# Patient Record
Sex: FEMALE | Race: WHITE | NOT HISPANIC OR LATINO | Employment: PART TIME | URBAN - METROPOLITAN AREA
[De-identification: names, ages, dates, MRNs, and addresses within clinical notes are randomized per-mention and may not be internally consistent; named-entity substitution may affect disease eponyms.]

---

## 2021-04-08 ENCOUNTER — TRANSFERRED RECORDS (OUTPATIENT)
Dept: HEALTH INFORMATION MANAGEMENT | Facility: CLINIC | Age: 51
End: 2021-04-08

## 2023-10-27 ENCOUNTER — TELEPHONE (OUTPATIENT)
Dept: TRANSPLANT | Facility: CLINIC | Age: 53
End: 2023-10-27
Payer: COMMERCIAL

## 2023-10-27 ENCOUNTER — DOCUMENTATION ONLY (OUTPATIENT)
Dept: TRANSPLANT | Facility: CLINIC | Age: 53
End: 2023-10-27
Payer: COMMERCIAL

## 2023-10-27 NOTE — TELEPHONE ENCOUNTER
"Donor Intake Start:10/10/23Donor Intake Complete:10/10/23  Expiration Date:24  Gender:FemalePreferred Language:English  Full Name:Jazzy King  Needed:[not answered]  Preferred Name:Jazzy  Phone Number:[phone] Phone:  Contact Preference:[not answered]Best Contact Time:11am - 4pm  Emergency Contact:Ethan Tan Contact #:[id number]  Relationship to Contact:Contact is my spouse  :72Age:53  Country:[place]  Address:[address] [place] [address] [place]  State:[place]:[id number]  Height:5'4\"Weight:178lbs  BMI:30.6  Employment Status:EmployedHas PTO for donation?Yes, using vacation  Occupation:RNRequires Heavy Lifting?  Yes  Education Level:Four Year DegreeMarital Status:  Exercise Routine:OccasionalHealth Insurance:  Yes  Blood Type:ABEthnicity/Race:White  Donor Type:Family Voucher Donor  Prefer Remote Donation:[not answered]  Physician:MERY Tafoya  Motivation to donate:  My brother did it and I was able to see first hand how he was able to change the trajectory of someone s life. I feel called to help someone in need.  Living Donor Pre-Screening  Is In [place]  Yes  Will Accept Blood Transfusions?  Yes  Has been Diagnosed with Kidney Disease?  No  Has had a Heart Attack?  No  Has Diabetes?  No  Has had Cancer?  No  Has had Kidney Stones?  No  Has ever been Pregnant?  Yes    - Is Currently Pregnant?  No    - Months Since Pregnancy?24+    - Is Currently Nursing?  No    - Gestational Diabetes?  No    - Hypertension during pregnancy?  Never  Is Planning on Pregnancy?  No  Is Taking Birth Control?  No  Has Used Tobacco  No  Has HIV?  No  Is Currently Incarcerated?  No  Is Currently Residing in [place]  Yes  History Misc  Has Allergies?  Yes  Allergy  Sertraline  Has had Surgeries?  No  Takes Medication?  No  Medical History  History of High BP?  Never  Has History Of CABG (bypass surgery)?  No  History of Blood Clots?  Never  History of " Coronary Disease?  Never  Has Stents Implanted?  No  Has History of Chest Pain with Exercise?  No  Has History of Chest Pain at Other Times?  No  Results of Climbing 2 Flights of Stairs?No Problem  Has had Stress Test within Last Year?  No  Has had Stroke?  No  Has had Leg Bypass?  No  History of Lung Disease?  Never  History of COPD?  Never  History of TB?  Never    - Is TB Active?[not answered]  History of Pneumonia?  Never  Has Respiratory Issues?  No  Has Gastro Issues?  No  History of Gallstones?  Never  History of Pancreatitis?  Never  History of Liver Disease?  Never  History of Hepatitis B?  Never    - Is Hep B Active?[not answered]  History of Hepatitis C?  Never  History of Bleeding Problem?  Never  History of UTIs?  Yes    - UTI episodes:3    - Last UTI:15+ years  History of Kidney Damage?  Never  History of Proteinuria?  Never  History of Hematuria?  Never  History of Neuro Disease?  Unknown  History of Seizure?  Never  History of Lupus?  Never  History of Paralysis?  Still being treated  History of Arthritis?  Treated in past  History of Neuropathy?  Never  History of Depression?  Never  History of Anxiety?  Never  History of Documented Psychiatric Illness?  Never  History of Fibroid Uterus?  Never  History of Endometriosis?  Never  History of Polycystic Ovaries?  Never  Has had Miscarriages?  No  Has had Abortions?  No  Has had Transfusions?  No  History of Obesity?  Unknown  History of Fabry's Disease?  No  History of Sickle Cell Disease?  No  History of Sickle Cell Trait?  No  History of Sarcoidosis?  No  History of Auto-Immune Disease  No  Has had Physical Exam?  Yes    - how many years ago:1  Has had Mammogram?  Yes    - how many years ago:1  Has had Pap Smear?  Yes    - how many years ago:3  Has had Colonoscopy?  Yes    - How Many Years Ago:2  Medical History Comments?[no comments]  Living Donor Family Medical History  Anyone with kidney disease?  Yes    - which family members:Father  Anyone with  liver disease?  Yes    - which family members:Father  Anyone with heart disease?  Yes    - which family members:Father, maternal grandfather  Anyone with coronary artery disease?  Yes    - which family members:Gather  Anyone with high blood pressure?  Yes    - which family members:Father  Anyone with blood disorder?  No  Anyone with cancer?  Yes    - which family members:Mother  Anyone with kidney cancer?  No  Anyone with diabetes?  Yes    - which family members:Father  Is mother alive?  No  Mother's age?64  Mother's cause of death?Breast cancer  Is father alive?  No  Father's age?62  Father's cause of death?Cardiac disease  How many siblings?2  How many adult children?2  How many children under 18?0  Social History  Has Used Alcohol?  Yes    - currently uses alcohol:  No    - how much:2/Yearly  Has Abused Alcohol?  No  Has Used Drugs?  No  Has had legal issues w/ law enforcement?  No  Traveled over 100 miles from home in last year?  Yes    - Traveled Where?[facility]  Has had suicidal thoughts or attempts in the last five years?  No

## 2023-10-30 ENCOUNTER — DOCUMENTATION ONLY (OUTPATIENT)
Dept: TRANSPLANT | Facility: CLINIC | Age: 53
End: 2023-10-30
Payer: COMMERCIAL

## 2023-11-07 ENCOUNTER — TELEPHONE (OUTPATIENT)
Dept: TRANSPLANT | Facility: CLINIC | Age: 53
End: 2023-11-07
Payer: COMMERCIAL

## 2023-11-07 NOTE — TELEPHONE ENCOUNTER
Initial Independent Living Donor Advocate contact made with potential donor today.  I introduced myself and my role during the donation process, including:   MEHDI ROLE   The federal government requires that all licensed transplant centers provide the living donor with an Independent Living Donor Advocate (MEHDI).  I do not meet recipients or attend meetings that discuss their care or decision to transplant them. My role is separate to avoid any conflict of interest.  My role is to ensure:  1) your rights are protected;  2) you get all the information you need from the transplant team to make a fully informed decision whether to donate;   3) that living donation is in your best interest.   4) that you have the right to decide NOT to go forward with living donation at any time during this process.  I am available to you throughout the workup, during surgery phase and follow-up at home.     WORKUP & PRIVACY   Your identity and workup are not shared with the recipient at any time.   The recipient's insurance covers the medical expenses related to the donor evaluation and surgery.  However, it is important that you carry your own health insurance to address any medical issues that are found and are NOT related to living donation.  Additionally, age appropriate cancer screening (I.e. mammograms,  colonoscopies, etc) is required and would be through your insurance.  There is a psychosocial and medical donor workup that consists of testing to determine if you are healthy enough to donate. Workup tests include tissue typing/genetics, many blood draws, urine collection/ (kidney function testing), chest x-ray, EKG/other heart testing, CT scan. Age appropriate cancer screening is required and would be through your insurance. As you complete each step then you may move on to the next.  Workup can take as little or as long as you need and you can stop the process at any time. Transplant is a treatment option, not a cure. A kidney  from a living kidney donor can last 12-14 years.  Other treatment options are  donation and two types of dialysis.   This is major surgery and your estimated hospital stay is approximately 1-2 nights.  After surgery, there are driving and lifting restrictions - no driving for two weeks and no lifting over ten pounds for 8 weeks.  Donors are routinely off from work for 4 - 6 weeks after surgery, and potentially longer if they have a physical job.     If you anticipate lost wages due to donation, donor wage reimbursement options may be available to you and will be reviewed with you during the evaluation process. Donor Shield and NLDAC explained.  We reviewed the importance of completing follow-up labs and surveys at six months, 1 year and 2 years after donation to monitor kidney health and the impact donation has had on their life post donation.        QUESTIONS    Have you received the Welcome e-mail that includes copies of the informed consent, financial letter, information on donor shield and NLDAC from the transplant department? Yes.    Have you discussed with anyone your potential decision to donate?   Yes.    Is anyone pressuring or coercing you to donate? No.    Have you discussed any financial arrangements with recipient around donating a kidney? No.    Are you aware that you can confidentially opt out at any time, up to and including day of donation? Yes.    At this time, would you like to proceed with the medical evaluation to see if you can be a kidney donor?  Yes.    If yes, I will make an appointment for your donor coordinator to reach out to you with next steps.     Contact information for MEHDI's was provided Yes.    Krissy Garcia- [phone]  Ese Ramirez-  [phone]    Confirmed that she reviewed Informed consent document and all questions answered.  Reviewed that they will receive Docusign to obtain electronic signature for the following: Informed consent, SRTR data, QING for medical  information, Auth for Electronic communication, Kidney for Life and will need their signed consent back before proceeding with evaluation.     Time frame for donation: open  Paired exchange was introduced  Yes.      MyChart was initiated  Yes.  CareEverywhere was initiated No.    MEHDI NOTES: Jazzy wants to be a NDD. Her brother was a living liver donor last year in [place] and this inspired her. She is scheduled to talk to India Moreno on 11/20 at 11am.       Duration of call 30 minutes     AUNG Liu, CCTSW   Independent Living Donor Advocate  [facility], [facility]  Direct: [phone]  E-Mail: [email or web address]

## 2023-11-20 NOTE — TELEPHONE ENCOUNTER
Contacted Jazzy Agosto to introduce myself and my role, review of medical/surgical/family history and next steps.     Jazzy Agosto  is aware She can stop donor evaluation at any time.    Regular blood donor? No      Jazzy Agosto is a 53 year old female  ABO AB that would like to learn more about non directed donation. Open to paired exchange: yes     Concerns from medical/surgical/family history: no daily medications, history of dysplastic nevus- follows with derm, knee fracture, cancer screenings up to date    Current medications and NSAID use: has used NSAIDs infrequently     Allergies reviewed: zoloft    Legal issues w/ law enforcement: no    Reviewed any history of travel in endemic areas: [place], [place], [place]   Strongyloides- [place], [place] and [place].  Trypanosoma cruzi ([place])- [place]  West Nile Virus- [place], [place], [place], [place] and [place]. (Included in KENDELL testing prior to donation)    Per our Phase 1 algorithm, does not meet criteria to do preliminary testing. Social work screening no.      Verified that potential donor was provided the informed consent DocuSign and they are comfortable moving forward to living donor evaluation.    Reviewed evaluation testing: Iohexol, Lab work, CXR, EKG, Provider visits and functions, CT Angiogram.     Reviewed operations of selection committee and angio review meetings and the need for multidisciplinary input. Post-donation requirements include post-op appointment with your surgeon at 2 weeks after surgery, 6 week, 6 month, 1 year and 2 year lab tests.     Reviewed NKR listing and transfer of care to [facility] team if approved. Provided Jazzy with NKR website to review.     Briefly went over options if approved of NDD and voucher donation.     Jazzy would like to proceed with next steps: evaluation on 12/07/23     Encouraged sign up for [employer] and reviewed importance of watching teaching videos prior to  evaluation.     Verified recipient status if not NDD.    Donor timeline: TBD     Will send orders to scheduling team to set up for evaluation testing.

## 2023-11-21 DIAGNOSIS — Z00.5 TRANSPLANT DONOR EVALUATION: Primary | ICD-10-CM

## 2023-11-21 RX ORDER — ALBUTEROL SULFATE 0.83 MG/ML
2.5 SOLUTION RESPIRATORY (INHALATION)
Status: CANCELLED | OUTPATIENT
Start: 2023-12-07

## 2023-11-21 RX ORDER — ALBUTEROL SULFATE 90 UG/1
1-2 AEROSOL, METERED RESPIRATORY (INHALATION)
Status: CANCELLED
Start: 2023-12-07

## 2023-11-21 RX ORDER — EPINEPHRINE 1 MG/ML
0.3 INJECTION, SOLUTION, CONCENTRATE INTRAVENOUS EVERY 5 MIN PRN
Status: CANCELLED | OUTPATIENT
Start: 2023-12-07

## 2023-11-21 RX ORDER — MEPERIDINE HYDROCHLORIDE 25 MG/ML
25 INJECTION INTRAMUSCULAR; INTRAVENOUS; SUBCUTANEOUS EVERY 30 MIN PRN
Status: CANCELLED | OUTPATIENT
Start: 2023-12-07

## 2023-11-21 RX ORDER — DIPHENHYDRAMINE HYDROCHLORIDE 50 MG/ML
50 INJECTION INTRAMUSCULAR; INTRAVENOUS
Status: CANCELLED
Start: 2023-12-07

## 2023-11-21 RX ORDER — METHYLPREDNISOLONE SODIUM SUCCINATE 125 MG/2ML
125 INJECTION, POWDER, LYOPHILIZED, FOR SOLUTION INTRAMUSCULAR; INTRAVENOUS
Status: CANCELLED
Start: 2023-12-07

## 2023-12-04 ENCOUNTER — TELEPHONE (OUTPATIENT)
Dept: TRANSPLANT | Facility: CLINIC | Age: 53
End: 2023-12-04
Payer: COMMERCIAL

## 2023-12-04 NOTE — TELEPHONE ENCOUNTER
MEHDI received call that Jazzy wants to reschedule her eval as she has to go to a . Called back and she was able to talk to someone in the main txp office about this.     Ese Ramirez, [facility], Paul Oliver Memorial HospitalSW   Independent Living Donor Advocate  [facility], [facility]  Direct: [phone]  E-Mail: [email or web address]

## 2023-12-31 ENCOUNTER — HEALTH MAINTENANCE LETTER (OUTPATIENT)
Age: 53
End: 2023-12-31

## 2024-01-17 LAB
ABO/RH(D): NORMAL
ANTIBODY SCREEN: NEGATIVE
SPECIMEN EXPIRATION DATE: NORMAL

## 2024-01-18 ENCOUNTER — OFFICE VISIT (OUTPATIENT)
Dept: TRANSPLANT | Facility: CLINIC | Age: 54
End: 2024-01-18
Attending: INTERNAL MEDICINE

## 2024-01-18 ENCOUNTER — ALLIED HEALTH/NURSE VISIT (OUTPATIENT)
Dept: TRANSPLANT | Facility: CLINIC | Age: 54
End: 2024-01-18
Attending: INTERNAL MEDICINE

## 2024-01-18 ENCOUNTER — LAB (OUTPATIENT)
Dept: LAB | Facility: CLINIC | Age: 54
End: 2024-01-18
Attending: INTERNAL MEDICINE

## 2024-01-18 ENCOUNTER — OFFICE VISIT (OUTPATIENT)
Dept: INFUSION THERAPY | Facility: CLINIC | Age: 54
End: 2024-01-18
Attending: INTERNAL MEDICINE

## 2024-01-18 ENCOUNTER — LAB (OUTPATIENT)
Dept: LAB | Facility: CLINIC | Age: 54
End: 2024-01-18

## 2024-01-18 ENCOUNTER — ANCILLARY PROCEDURE (OUTPATIENT)
Dept: GENERAL RADIOLOGY | Facility: CLINIC | Age: 54
End: 2024-01-18
Attending: INTERNAL MEDICINE
Payer: COMMERCIAL

## 2024-01-18 ENCOUNTER — ANCILLARY PROCEDURE (OUTPATIENT)
Dept: CT IMAGING | Facility: CLINIC | Age: 54
End: 2024-01-18
Attending: INTERNAL MEDICINE
Payer: COMMERCIAL

## 2024-01-18 VITALS
RESPIRATION RATE: 16 BRPM | SYSTOLIC BLOOD PRESSURE: 147 MMHG | TEMPERATURE: 96.8 F | DIASTOLIC BLOOD PRESSURE: 87 MMHG | WEIGHT: 173.3 LBS | OXYGEN SATURATION: 98 % | HEART RATE: 80 BPM

## 2024-01-18 VITALS
HEART RATE: 78 BPM | BODY MASS INDEX: 28.82 KG/M2 | HEIGHT: 65 IN | DIASTOLIC BLOOD PRESSURE: 64 MMHG | OXYGEN SATURATION: 96 % | WEIGHT: 173 LBS | SYSTOLIC BLOOD PRESSURE: 121 MMHG | TEMPERATURE: 98 F

## 2024-01-18 VITALS — HEIGHT: 65 IN | BODY MASS INDEX: 29.29 KG/M2

## 2024-01-18 DIAGNOSIS — Z00.5 TRANSPLANT DONOR EVALUATION: Primary | ICD-10-CM

## 2024-01-18 DIAGNOSIS — Z00.5 TRANSPLANT DONOR EVALUATION: ICD-10-CM

## 2024-01-18 LAB
ABO/RH(D): NORMAL
ALBUMIN MFR UR ELPH: <6 MG/DL
ALBUMIN SERPL BCG-MCNC: 4.5 G/DL (ref 3.5–5.2)
ALBUMIN UR-MCNC: NEGATIVE MG/DL
ALP SERPL-CCNC: 80 U/L (ref 40–150)
ALT SERPL W P-5'-P-CCNC: 11 U/L (ref 0–50)
ANION GAP SERPL CALCULATED.3IONS-SCNC: 10 MMOL/L (ref 7–15)
APPEARANCE UR: CLEAR
APTT PPP: 27 SECONDS (ref 22–38)
AST SERPL W P-5'-P-CCNC: 19 U/L (ref 0–45)
BACTERIA #/AREA URNS HPF: ABNORMAL /HPF
BILIRUB SERPL-MCNC: 0.4 MG/DL
BILIRUB UR QL STRIP: NEGATIVE
BUN SERPL-MCNC: 9.7 MG/DL (ref 6–20)
CALCIUM SERPL-MCNC: 9.2 MG/DL (ref 8.6–10)
CHLORIDE SERPL-SCNC: 105 MMOL/L (ref 98–107)
CHOLEST SERPL-MCNC: 204 MG/DL
CMV IGG SERPL IA-ACNC: <0.2 U/ML
CMV IGG SERPL IA-ACNC: NORMAL
COLOR UR AUTO: ABNORMAL
CREAT SERPL-MCNC: 0.77 MG/DL (ref 0.51–0.95)
CREAT UR-MCNC: 42 MG/DL
DEPRECATED HCO3 PLAS-SCNC: 25 MMOL/L (ref 22–29)
EBV VCA IGG SER IA-ACNC: 200 U/ML
EBV VCA IGG SER IA-ACNC: POSITIVE
EGFRCR SERPLBLD CKD-EPI 2021: >90 ML/MIN/1.73M2
ERYTHROCYTE [DISTWIDTH] IN BLOOD BY AUTOMATED COUNT: 12.8 % (ref 10–15)
FASTING STATUS PATIENT QL REPORTED: YES
GLUCOSE SERPL-MCNC: 91 MG/DL (ref 70–99)
GLUCOSE UR STRIP-MCNC: NEGATIVE MG/DL
HBA1C MFR BLD: 5.5 %
HCG INTACT+B SERPL-ACNC: <1 MIU/ML
HCT VFR BLD AUTO: 45.2 % (ref 35–47)
HDLC SERPL-MCNC: 44 MG/DL
HGB BLD-MCNC: 15.1 G/DL (ref 11.7–15.7)
HGB UR QL STRIP: NEGATIVE
INR PPP: 1.13 (ref 0.85–1.15)
KETONES UR STRIP-MCNC: NEGATIVE MG/DL
LDLC SERPL CALC-MCNC: 145 MG/DL
LEUKOCYTE ESTERASE UR QL STRIP: NEGATIVE
MCH RBC QN AUTO: 30.1 PG (ref 26.5–33)
MCHC RBC AUTO-ENTMCNC: 33.4 G/DL (ref 31.5–36.5)
MCV RBC AUTO: 90 FL (ref 78–100)
NITRATE UR QL: NEGATIVE
NONHDLC SERPL-MCNC: 160 MG/DL
PH UR STRIP: 6 [PH] (ref 5–7)
PHOSPHATE SERPL-MCNC: 3.4 MG/DL (ref 2.5–4.5)
PLATELET # BLD AUTO: 233 10E3/UL (ref 150–450)
POTASSIUM SERPL-SCNC: 3.8 MMOL/L (ref 3.4–5.3)
PROT SERPL-MCNC: 7.5 G/DL (ref 6.4–8.3)
PROT/CREAT 24H UR: NORMAL MG/G{CREAT}
RBC # BLD AUTO: 5.02 10E6/UL (ref 3.8–5.2)
RBC URINE: 2 /HPF
SODIUM SERPL-SCNC: 140 MMOL/L (ref 135–145)
SP GR UR STRIP: 1 (ref 1–1.03)
SPECIMEN EXPIRATION DATE: NORMAL
SQUAMOUS EPITHELIAL: 3 /HPF
T PALLIDUM AB SER QL: NONREACTIVE
TRIGL SERPL-MCNC: 74 MG/DL
URATE SERPL-MCNC: 4.6 MG/DL (ref 2.4–5.7)
UROBILINOGEN UR STRIP-MCNC: NORMAL MG/DL
WBC # BLD AUTO: 4.9 10E3/UL (ref 4–11)
WBC URINE: 1 /HPF

## 2024-01-18 PROCEDURE — 36415 COLL VENOUS BLD VENIPUNCTURE: CPT

## 2024-01-18 PROCEDURE — 84100 ASSAY OF PHOSPHORUS: CPT

## 2024-01-18 PROCEDURE — 85027 COMPLETE CBC AUTOMATED: CPT

## 2024-01-18 PROCEDURE — 99207 PR NO CHARGE COORDINATED CARE PS: CPT

## 2024-01-18 PROCEDURE — 83036 HEMOGLOBIN GLYCOSYLATED A1C: CPT

## 2024-01-18 PROCEDURE — 80053 COMPREHEN METABOLIC PANEL: CPT

## 2024-01-18 PROCEDURE — 85610 PROTHROMBIN TIME: CPT

## 2024-01-18 PROCEDURE — 86900 BLOOD TYPING SEROLOGIC ABO: CPT

## 2024-01-18 PROCEDURE — 86706 HEP B SURFACE ANTIBODY: CPT

## 2024-01-18 PROCEDURE — 81378 HLA I & II TYPING HR: CPT

## 2024-01-18 PROCEDURE — 84156 ASSAY OF PROTEIN URINE: CPT

## 2024-01-18 PROCEDURE — 86788 WEST NILE VIRUS AB IGM: CPT

## 2024-01-18 PROCEDURE — 86665 EPSTEIN-BARR CAPSID VCA: CPT

## 2024-01-18 PROCEDURE — 74175 CTA ABDOMEN W/CONTRAST: CPT | Mod: GC | Performed by: RADIOLOGY

## 2024-01-18 PROCEDURE — 93000 ELECTROCARDIOGRAM COMPLETE: CPT | Performed by: INTERNAL MEDICINE

## 2024-01-18 PROCEDURE — 71046 X-RAY EXAM CHEST 2 VIEWS: CPT | Performed by: RADIOLOGY

## 2024-01-18 PROCEDURE — 85730 THROMBOPLASTIN TIME PARTIAL: CPT

## 2024-01-18 PROCEDURE — 86481 TB AG RESPONSE T-CELL SUSP: CPT

## 2024-01-18 PROCEDURE — 99213 OFFICE O/P EST LOW 20 MIN: CPT | Performed by: SURGERY

## 2024-01-18 PROCEDURE — 99205 OFFICE O/P NEW HI 60 MIN: CPT | Performed by: SURGERY

## 2024-01-18 PROCEDURE — 86803 HEPATITIS C AB TEST: CPT

## 2024-01-18 PROCEDURE — 99205 OFFICE O/P NEW HI 60 MIN: CPT | Performed by: INTERNAL MEDICINE

## 2024-01-18 PROCEDURE — 82570 ASSAY OF URINE CREATININE: CPT

## 2024-01-18 PROCEDURE — 36415 COLL VENOUS BLD VENIPUNCTURE: CPT | Performed by: SURGERY

## 2024-01-18 PROCEDURE — 84702 CHORIONIC GONADOTROPIN TEST: CPT

## 2024-01-18 PROCEDURE — 255N000002 HC RX 255 OP 636: Performed by: INTERNAL MEDICINE

## 2024-01-18 PROCEDURE — 82542 COL CHROMOTOGRAPHY QUAL/QUAN: CPT | Performed by: SURGERY

## 2024-01-18 PROCEDURE — 80061 LIPID PANEL: CPT

## 2024-01-18 PROCEDURE — 86780 TREPONEMA PALLIDUM: CPT

## 2024-01-18 PROCEDURE — 81001 URINALYSIS AUTO W/SCOPE: CPT

## 2024-01-18 PROCEDURE — 84550 ASSAY OF BLOOD/URIC ACID: CPT

## 2024-01-18 PROCEDURE — 86644 CMV ANTIBODY: CPT

## 2024-01-18 PROCEDURE — 86704 HEP B CORE ANTIBODY TOTAL: CPT

## 2024-01-18 PROCEDURE — 87340 HEPATITIS B SURFACE AG IA: CPT

## 2024-01-18 RX ORDER — IOPAMIDOL 755 MG/ML
90 INJECTION, SOLUTION INTRAVASCULAR ONCE
Status: COMPLETED | OUTPATIENT
Start: 2024-01-18 | End: 2024-01-18

## 2024-01-18 RX ORDER — ALBUTEROL SULFATE 90 UG/1
1-2 AEROSOL, METERED RESPIRATORY (INHALATION)
Status: CANCELLED
Start: 2024-01-18

## 2024-01-18 RX ORDER — METHYLPREDNISOLONE SODIUM SUCCINATE 125 MG/2ML
125 INJECTION, POWDER, LYOPHILIZED, FOR SOLUTION INTRAMUSCULAR; INTRAVENOUS
Status: CANCELLED
Start: 2024-01-18

## 2024-01-18 RX ORDER — DIPHENHYDRAMINE HYDROCHLORIDE 50 MG/ML
50 INJECTION INTRAMUSCULAR; INTRAVENOUS
Status: CANCELLED
Start: 2024-01-18

## 2024-01-18 RX ORDER — ALBUTEROL SULFATE 0.83 MG/ML
2.5 SOLUTION RESPIRATORY (INHALATION)
Status: CANCELLED | OUTPATIENT
Start: 2024-01-18

## 2024-01-18 RX ORDER — EPINEPHRINE 1 MG/ML
0.3 INJECTION, SOLUTION INTRAMUSCULAR; SUBCUTANEOUS EVERY 5 MIN PRN
Status: CANCELLED | OUTPATIENT
Start: 2024-01-18

## 2024-01-18 RX ORDER — MEPERIDINE HYDROCHLORIDE 25 MG/ML
25 INJECTION INTRAMUSCULAR; INTRAVENOUS; SUBCUTANEOUS EVERY 30 MIN PRN
Status: CANCELLED | OUTPATIENT
Start: 2024-01-18

## 2024-01-18 RX ADMIN — IOPAMIDOL 90 ML: 755 INJECTION, SOLUTION INTRAVASCULAR at 11:57

## 2024-01-18 RX ADMIN — IOHEXOL 4 ML: 350 INJECTION, SOLUTION INTRAVENOUS at 07:29

## 2024-01-18 ASSESSMENT — PAIN SCALES - GENERAL: PAINLEVEL: NO PAIN (0)

## 2024-01-18 NOTE — LETTER
"    1/18/2024         RE: Jazzy Agosto  [address] [place] [address]        Dear Colleague,    Thank you for referring your patient, Jazzy Agosto, to the [facility]. Please see a copy of my visit note below.    Chief Complaint   Patient presents with     Iohexol     Iohexol Timed Test Nursing Note    Patient comes to [facility] today for a Iohexol GFR Timed test.   Orders from Dr. Mejia were completed.    Progress Note  Height: 5'4.5\"  Weight: 173 lb  Age: 53  Gender: female    The following information was verified with the patient:  *Female patients are not pregnant or could not have become recently pregnant: No  *Is there a history of allergy (skin rash, swelling, ect) to :   a. Iodine (except skin reactions to Betadine): NO   b. Intravenous radio-contrast agents: NO   c. Seafood: NO     RN provided patient with educational handout regarding timed test. YES    Medication administered :   Iohexol (Omnipaque 350 mg Iodine/ ml concentration) 4 mls.  Site administered left AC  Start time 0730  Stop time 0732    Blood draws were taken from left AC- second ABO.  Baseline (Time 0) 0732: the time immediately after the injection is completed.  (2 Hour) 0932  (4 hour) 1132      Patient tolerated the procedure well    Discharge Plan  Discharge instructions reviewed with patient: YES  Discharge papers printed and given to patient: YES  Patient/Representative verbalized understanding, all questions answered: YES    Discharged from unit at 0735 with whom: self to Transplant Clinic.    Administrations This Visit       iohexol (OMNIPAQUE) 350 MG/ML injectable solution 4 mL       Admin Date  01/18/2024 Action  $Given Dose  4 mL Route  Intravenous Documented By  Shelby Arellano, RN                Again, thank you for allowing me to participate in the care of your patient.        Sincerely,        Specialty Infusion Nurse  "

## 2024-01-18 NOTE — PROGRESS NOTES
Saw Jazzy in clinic on 1/18/24 for Living Kidney Donor Evaluation.     She is interested in donation as an NDD.    I provided a folder which included copies of the following:    Living Kidney Donor Evaluation Consent  Paired Exchange Consent  Donor Shield Pamphlet  Living Donor Collective Study information  Kidney for Life pamphlet  Kidney Donors are Heroes! Study synopsis  Most current SRTR data.      I also provided a parking pass and food voucher.      I reviewed the Living Kidney Donor Evaluation Consent, dated 7-6-2020 and Paired Exchange/ NDD consent dated 9-.  I answered any question.    Evaluation Notes:  Internal typing collected and sent   Donor documentation completed and sent for scan

## 2024-01-18 NOTE — LETTER
1/18/2024         RE: Jazzy Agosto  [address] [address] [address]        Dear Colleague,    Thank you for referring your patient, Jazzy Agosto, to the [facility] TRANSPLANT [facility]INIC. Please see a copy of my visit note below.    [facility]  Consult Note     Medical record number: [id number]  YOB: 1970,     Date of Visit:  01/18/2024  Consult requested by the patient for evaluation of kidney donation candidacy.    Assessment and Recommendations: Ms. Agosto appears to be a good candidate for kidney donation at this point in the evaluation. 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  No sig PMH  No abd surgery  No meds  Lost about 10 lbs over 3 months with diet and exercise  BMI is 29.18  No childbirths  Abd - fullness noted, not distended    Good candidate for donation    Risks of the surgical procedure including but not limited to the rare risk of mortality discussed in detail. Patient verbalized good understanding and had several pertinent questions which were answered satisfactorily.       Iohexol ordered for today for assessment of adequate kidney function for donation. Will be reviewed when resulted  Donor labs ordered for today and reviewed to include: CBC, CMP, Mg, PO4, hemoglobin A1c, lipid panel, blood type x 2, hemoglobin A1c, UA with microscopic, urine culture, urine protein/cr, INR/PTT, Quantiferon gold, hepatitis C (antibody), hepatitis B panel, HIV, treponemia, West Nile (antibody panel), CMV (antibody panel), EBV (antibody panel), pregnancy screen (for females of childbearing age), PSA (males >50yrs), HLA tissue typing, buccal swab for eplet typing  Social work consult ordered  Dietician consult ordered  Transplant nephrology consult ordered  Transplant coordinator consult ordered  MEHDI (independent living donor advovate) consult ordered  EKG  ordered for today and will be reviewed when resulted. Suitable to proceed today with this testing today:  Yes   Chest x-ray ordered for today and will be reviewed when resulted. Suitable to proceed with this testing today:  Yes   CT angio of abdomen and pelvis for anatomical assessment. Images and report to be reviewed when resulted.  Suitable to proceed with this testing today:  Yes  After review of the above, additional testing/concerns include:  none      The majority of our visit today was spent in counseling regarding the medical and surgical risks of kidney donation, the typical mary-and post-operative experience and recovery/return to work pattern.  We also talked about post-op visits and longer term health care maintenance, as well as the implications of having one remaining kidney. This discussion included, but was not limited to rates of complications such as bleeding, infection, need for transfusion, reoperation, other organ injury, future bowel obstruction, incisional hernia, port site pain, varicocele, venous thrombosis, pulmonary embolism, renal failure, and death (3 per 10,000). At the conclusion of the visit, all questions had been answered and Ms. Agosto's candidacy for donation will be reviewed at our Multidisciplinary Donor Selection Committee.     60 min spent on the date of the encounter in chart review, patient visit,  documentation and/or discussion with other providers about the issues documented above.    .  Wesley D[facility]az in Immunology and Transplantation  Surgical Director, Kidney & Pancreas Transplant Programs  Medical Director, Solid Organ Transplant Unit    ---------------------------------------------------------------------------------------------------    HPI: Ms. Agosto wishes to donate a kidney to non-directed.           NO  Personal history of cancer    []         Comment:     Personal history of kidney problems   []         Comment:   Personal history of  diabetes    []         Comment:                  Bladder emptying problems (prostate, urinary retention) []         Comment:   Neck or Back problems:     []         Comment:   Constipation      []         Comment:       Frequent NSAID use:         []         Comment:       Other:        []         Comment:                No past medical history on file.  No past surgical history on file.  No family history on file.  Social History     Socioeconomic History     Marital status:      Spouse name: Not on file     Number of children: Not on file     Years of education: Not on file     Highest education level: Not on file   Occupational History     Not on file   Tobacco Use     Smoking status: Never     Smokeless tobacco: Never   Substance and Sexual Activity     Alcohol use: Never     Drug use: Never     Sexual activity: Not on file   Other Topics Concern     Not on file   Social History Narrative     Not on file     Social Determinants of Health     Financial Resource Strain: Not on file   Food Insecurity: Not on file   Transportation Needs: Not on file   Physical Activity: Not on file   Stress: Not on file   Social Connections: Not on file   Interpersonal Safety: Not on file   Housing Stability: Not on file       ROS:   CONSTITUTIONAL:  No fevers or chills  EYES: negative for icterus  ENT:  negative for hearing loss, tinnitus and sore throat  RESPIRATORY:  negative for cough, sputum, dyspnea  CARDIOVASCULAR:  negative for chest pain  GASTROINTESTINAL:  negative for nausea, vomiting, diarrhea or constipation  GENITOURINARY:  negative for incontinence, dysuria, bladder emptying problems  HEME:  No easy bruising  INTEGUMENT:  negative for rash and pruritus  NEURO:  Negative for headache, seizure disorder    Allergies:   Allergies   Allergen Reactions     Sertraline Unknown       Medications:  Clinic-Administered Medications as of 1/18/2024         Dose Frequency Start End    iohexol (OMNIPAQUE) 350 MG/ML injectable  "solution 4 mL (Completed) 4 mL ONCE 1/18/2024 1/18/2024    Route: Intravenous          Exam:   Temp:  [96.8  F (36  C)-98  F (36.7  C)] 98  F (36.7  C)  Pulse:  [78-80] 78  Resp:  [16] 16  BP: (115-147)/(45-87) 121/64  SpO2:  [96 %-98 %] 96 %  Appearance: in no apparent distress.   Skin: normal  Head and Neck: Normal, no rashes or jaundice  Respiratory: normal respiratory excursions, no audible wheeze  Cardiovascular: RRR  Abdomen: rounded, No surgical scars   Extremeties: Edema, none  Neuro: without deficit       Labs:   ABO: AB POS  Chemistries:   Recent Labs   Lab Test 01/18/24  0659      POTASSIUM 3.8   CHLORIDE 105   CO2 25   ANIONGAP 10   GLC 91   BUN 9.7   CR 0.77   STU 9.2       Urine Studies:   Recent Labs   Lab Test 01/18/24  0659   COLOR Straw   APPEARANCE Clear   URINEGLC Negative   URINEBILI Negative   URINEKETONE Negative   SG 1.005   UBLD Negative   URINEPH 6.0   PROTEIN Negative   NITRITE Negative   LEUKEST Negative   RBCU 2   WBCU 1     No lab results found.    Hematology:      Recent Labs   Lab Test 01/18/24  0659   WBC 4.9   RBC 5.02   HGB 15.1   HCT 45.2   MCV 90   MCH 30.1   MCHC 33.4   RDW 12.8          Coags:   Recent Labs   Lab Test 01/18/24  0659   INR 1.13       Lipid Profile:   Cholesterol   Date Value Ref Range Status   01/18/2024 204 (H) <200 mg/dL Final     Triglycerides   Date Value Ref Range Status   01/18/2024 74 <150 mg/dL Final     Direct Measure HDL   Date Value Ref Range Status   01/18/2024 44 (L) >=50 mg/dL Final     LDL Cholesterol Calculated   Date Value Ref Range Status   01/18/2024 145 (H) <=100 mg/dL Final     Non HDL Cholesterol   Date Value Ref Range Status   01/18/2024 160 (H) <130 mg/dL Final       Virals:  CMV and EBV pending.   No lab results found.   No results found for: \"HCVAB\", \"HQTG\", \"HCGENO\", \"HCPCR\", \"HQTRNA\", \"HEPRNA\", \"CRYOG\"    No results found for: \"HCVAB\", \"HBSAB\", \"HBSAG\"           Again, thank you for allowing me to participate in the care " of your patient.        Sincerely,        Kelly Barker MD

## 2024-01-18 NOTE — LETTER
1/18/2024         RE: Jazzy Agosto  [address] [place] [address]        Dear Colleague,    Thank you for referring your patient, Jazzy Agosto, to the [facility] TRANSPLANT [facility]. Please see a copy of my visit note below.    Saw Jazzy in clinic on 1/18/24 for Living Kidney Donor Evaluation.     She is interested in donation as an NDD.    I provided a folder which included copies of the following:    Living Kidney Donor Evaluation Consent  Paired Exchange Consent  Donor Shield Pamphlet  Living Donor Collective Study information  Kidney for Life pamphlet  Kidney Donors are Heroes! Study synopsis  Most current SRTR data.      I also provided a parking pass and food voucher.      I reviewed the Living Kidney Donor Evaluation Consent, dated 7-6-2020 and Paired Exchange/ NDD consent dated 9-.  I answered any question.    Evaluation Notes:  Internal typing collected and sent   Donor documentation completed and sent for scan        Again, thank you for allowing me to participate in the care of your patient.        Sincerely,        Transplant Care Coordinator

## 2024-01-18 NOTE — PROGRESS NOTES
[facility]  Consult Note     Medical record number: [id number]  YOB: 1970,     Date of Visit:  01/18/2024  Consult requested by the patient for evaluation of kidney donation candidacy.    Assessment and Recommendations: Ms. Agosto appears to be a good candidate for kidney donation at this point in the evaluation. 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  No sig PMH  No abd surgery  No meds  Lost about 10 lbs over 3 months with diet and exercise  BMI is 29.18  No childbirths  Abd - fullness noted, not distended    Good candidate for donation    Risks of the surgical procedure including but not limited to the rare risk of mortality discussed in detail. Patient verbalized good understanding and had several pertinent questions which were answered satisfactorily.       Iohexol ordered for today for assessment of adequate kidney function for donation. Will be reviewed when resulted  Donor labs ordered for today and reviewed to include: CBC, CMP, Mg, PO4, hemoglobin A1c, lipid panel, blood type x 2, hemoglobin A1c, UA with microscopic, urine culture, urine protein/cr, INR/PTT, Quantiferon gold, hepatitis C (antibody), hepatitis B panel, HIV, treponemia, West Nile (antibody panel), CMV (antibody panel), EBV (antibody panel), pregnancy screen (for females of childbearing age), PSA (males >50yrs), HLA tissue typing, buccal swab for eplet typing  Social work consult ordered  Dietician consult ordered  Transplant nephrology consult ordered  Transplant coordinator consult ordered  MEHDI (independent living donor advovate) consult ordered  EKG ordered for today and will be reviewed when resulted. Suitable to proceed today with this testing today:  Yes   Chest x-ray ordered for today and will be reviewed when resulted. Suitable to proceed with this testing today:  Yes   CT angio of abdomen and pelvis  for anatomical assessment. Images and report to be reviewed when resulted.  Suitable to proceed with this testing today:  Yes  After review of the above, additional testing/concerns include:  none      The majority of our visit today was spent in counseling regarding the medical and surgical risks of kidney donation, the typical mary-and post-operative experience and recovery/return to work pattern.  We also talked about post-op visits and longer term health care maintenance, as well as the implications of having one remaining kidney. This discussion included, but was not limited to rates of complications such as bleeding, infection, need for transfusion, reoperation, other organ injury, future bowel obstruction, incisional hernia, port site pain, varicocele, venous thrombosis, pulmonary embolism, renal failure, and death (3 per 10,000). At the conclusion of the visit, all questions had been answered and Ms. Agosto's candidacy for donation will be reviewed at our Multidisciplinary Donor Selection Committee.     60 min spent on the date of the encounter in chart review, patient visit,  documentation and/or discussion with other providers about the issues documented above.    .  Wesley D[facility]az in Immunology and Transplantation  Surgical Director, Kidney & Pancreas Transplant Programs  Medical Director, Solid Organ Transplant Unit    ---------------------------------------------------------------------------------------------------    HPI: Ms. Agosto wishes to donate a kidney to non-directed.           NO  Personal history of cancer    []         Comment:     Personal history of kidney problems   []         Comment:   Personal history of diabetes    []         Comment:                  Bladder emptying problems (prostate, urinary retention) []         Comment:   Neck or Back problems:     []         Comment:   Constipation      []         Comment:       Frequent NSAID use:         []         Comment:        Other:        []         Comment:                No past medical history on file.  No past surgical history on file.  No family history on file.  Social History     Socioeconomic History    Marital status:      Spouse name: Not on file    Number of children: Not on file    Years of education: Not on file    Highest education level: Not on file   Occupational History    Not on file   Tobacco Use    Smoking status: Never    Smokeless tobacco: Never   Substance and Sexual Activity    Alcohol use: Never    Drug use: Never    Sexual activity: Not on file   Other Topics Concern    Not on file   Social History Narrative    Not on file     Social Determinants of Health     Financial Resource Strain: Not on file   Food Insecurity: Not on file   Transportation Needs: Not on file   Physical Activity: Not on file   Stress: Not on file   Social Connections: Not on file   Interpersonal Safety: Not on file   Housing Stability: Not on file       ROS:   CONSTITUTIONAL:  No fevers or chills  EYES: negative for icterus  ENT:  negative for hearing loss, tinnitus and sore throat  RESPIRATORY:  negative for cough, sputum, dyspnea  CARDIOVASCULAR:  negative for chest pain  GASTROINTESTINAL:  negative for nausea, vomiting, diarrhea or constipation  GENITOURINARY:  negative for incontinence, dysuria, bladder emptying problems  HEME:  No easy bruising  INTEGUMENT:  negative for rash and pruritus  NEURO:  Negative for headache, seizure disorder    Allergies:   Allergies   Allergen Reactions    Sertraline Unknown       Medications:  Clinic-Administered Medications as of 1/18/2024         Dose Frequency Start End    iohexol (OMNIPAQUE) 350 MG/ML injectable solution 4 mL (Completed) 4 mL ONCE 1/18/2024 1/18/2024    Route: Intravenous          Exam:   Temp:  [96.8  F (36  C)-98  F (36.7  C)] 98  F (36.7  C)  Pulse:  [78-80] 78  Resp:  [16] 16  BP: (115-147)/(45-87) 121/64  SpO2:  [96 %-98 %] 96 %  Appearance: in no apparent distress.  "  Skin: normal  Head and Neck: Normal, no rashes or jaundice  Respiratory: normal respiratory excursions, no audible wheeze  Cardiovascular: RRR  Abdomen: rounded, No surgical scars   Extremeties: Edema, none  Neuro: without deficit       Labs:   ABO: AB POS  Chemistries:   Recent Labs   Lab Test 01/18/24  0659      POTASSIUM 3.8   CHLORIDE 105   CO2 25   ANIONGAP 10   GLC 91   BUN 9.7   CR 0.77   STU 9.2       Urine Studies:   Recent Labs   Lab Test 01/18/24  0659   COLOR Straw   APPEARANCE Clear   URINEGLC Negative   URINEBILI Negative   URINEKETONE Negative   SG 1.005   UBLD Negative   URINEPH 6.0   PROTEIN Negative   NITRITE Negative   LEUKEST Negative   RBCU 2   WBCU 1     No lab results found.    Hematology:      Recent Labs   Lab Test 01/18/24  0659   WBC 4.9   RBC 5.02   HGB 15.1   HCT 45.2   MCV 90   MCH 30.1   MCHC 33.4   RDW 12.8          Coags:   Recent Labs   Lab Test 01/18/24  0659   INR 1.13       Lipid Profile:   Cholesterol   Date Value Ref Range Status   01/18/2024 204 (H) <200 mg/dL Final     Triglycerides   Date Value Ref Range Status   01/18/2024 74 <150 mg/dL Final     Direct Measure HDL   Date Value Ref Range Status   01/18/2024 44 (L) >=50 mg/dL Final     LDL Cholesterol Calculated   Date Value Ref Range Status   01/18/2024 145 (H) <=100 mg/dL Final     Non HDL Cholesterol   Date Value Ref Range Status   01/18/2024 160 (H) <130 mg/dL Final       Virals:  CMV and EBV pending.   No lab results found.   No results found for: \"HCVAB\", \"HQTG\", \"HCGENO\", \"HCPCR\", \"HQTRNA\", \"HEPRNA\", \"CRYOG\"    No results found for: \"HCVAB\", \"HBSAB\", \"HBSAG\"       "

## 2024-01-18 NOTE — PROGRESS NOTES
Living Kidney Donor Consent per OPTN Policy 14.2 for Independent Living Donor Advocate (MEHDI)    Organ Type: living kidney donor (NDD)  Presenting Information:  She presents to [facility], [facility] to complete a living donor evaluation since she is interested in becoming a living kidney donor.      Written assurance has been obtained from the potential donor that he/she:   Is willing to donate  Is free from inducement and coercion  Has been informed that the he/she may decline to donate at any time  Has been informed that transplant centers must:   A) Offer donors an opportunity to discontinue the donor consent or evaluation process in a way that is protected and confidential  B) Provide an independent living donor advocate (MEHDI) to assist the potential donor during this process    The following was presented to the potential donor in a language in which the potential donor is able to engage in meaningful dialogue:   Education and instruction about all phases of the living donation process including:   Consent  Medical and psychosocial evaluation  Information about the surgical procedure  Pre and post operative care  Benefits of post operative follow up  Disclosure that the recovery hospital will take all reasonable precautions to provide confidentiality for the donor/recipient  Disclosure that it is a federal crime for any person to knowingly acquire, obtain or otherwise transfer any human organ for valuable consideration  Disclosure that the [facility] hospital must provide an independent living donor advocate (MEHDI)  Disclosure that health information obtained during the evaluation is subject to the same regulations as all records and could reveal conditions that must be reported to local, state, or federal public health authorities  Disclosure that the [facility] hospital is required to report living donor follow up information at 6 months, 1 year,  and 2 years, and that the potential donor must commit to post operative follow up testing coordinated by the [facility]    Disclosure has been provided that these risks may be transient or permanent & include but are not limited to:  Potential psychosocial risks:  Problems with body image  Post-surgery depression or anxiety  Feelings of emotional distress or bereavement if recipient experiences any recurrent disease or in the event of the recipient s death  Impact of donation on the donor s lifestyle, such as limited ability to exercise in the short term post operative recovery period, no driving for the first 2 weeks post op or until the donor is no longer needing pain medications that impair the ability to drive.      Potential financial impacts:  Personal expenses of travel, housing, , lost wages related to donation might not be reimbursed. However, resources may be available to defray some donation-related expenses.   Need for life-long follow up at the donor s expense  Loss of employment or income  Negative impact on the ability to obtain future employment  Negative impact on the ability to obtain, maintain, or afford health, disability, and life insurance  Future health problems experienced by living donors following donation may not be covered by the recipient s insurance      PREPARATION FOR DONATION, RECOVERY, AND POTENTIAL SHORT-LONG-TERM OUTCOMES:  Understanding of the Living Donation Process:  We discussed the role of Independent Living Donor Advocate.  Short and long term medical and psychosocial risks to both, donor and recipient were reviewed and she expressed understanding.  Post surgical restrictions (2 weeks no driving, 6 weeks no lifting over 10 lbs) were reviewed and she appears capable of adhering to the post surgical requirements. The need for a caregiver was discussed and has support from  and family.  The risk of poor psychosocial outcome including problems with body  image, post-surgery depression or anxiety, or feelings of emotional distress or bereavement if recipient experiences any recurrent disease, poor outcome or death was reviewed.  Additionally, potential financial implications, including the risk of having difficulty obtaining health care insurance, life insurance, disability insurance, or long term care insurance were reviewed, as were available donor grants to assist with donor related expenses.      IMPRESSIONS/RECOMMENDATIONS:  Jazzy appears highly motivated to donate a kidney as a NDD.  She appears capable of understanding this information and making an informed medical decision.  As MEHDI, no concerns were identified today.  She has my contact information and is aware that I am available thoughout the donation process.    Contact Information:  Ese Ramirez, [facility], [facility]   Independent Living Donor Advocate  [facility], [facility], [facility]  Direct: [phone]  E-Mail: [email or web address]      Time Spent: 20 minutes

## 2024-01-18 NOTE — LETTER
1/18/2024         RE: Jazzy Agosto  [id number] [facility] [id number]        Dear Colleague,    Thank you for referring your patient, Jazzy Agosto, to the [facility]. Please see a copy of my visit note below.    TRANSPLANT NEPHROLOGY DONOR EVALUATION    Assessment and Plan:  # Prospective Kidney Transplant Donor: Patient with no issues that need to be addressed prior to donation. Patient's blood pressure is acceptable at this visit, kidney function appears to be acceptable with Iohexol pending, and urinalysis is bland.    Discussed the risks of donating a kidney, including the surgical risk and the possible risks of living with one kidney.    Education about expected post-donation kidney function and how chronic kidney disease (CKD) and end stage kidney disease (ESKD) might potentially impact the donor in the future, include, but not limited to:       - On average, donors will have 25-35% permanent loss of kidney function at donation.       - Baseline risk of ESKD may slightly exceed that of members of the general        population with the same demographic profile.       - Donor risks must be interpreted in light of known epidemiology of both CKD or         ESKD, such as that CKD generally develops in midlife (40-50 years old) and ESKD         generally develops after age 60.       - Medical evaluation of young potential donors cannot predict lifetime risk of CKD or         ESKD.       - Donors may be at higher risk for CKD if they sustain damage to the remaining         kidney.       - Development of CKD and progression of ESKD may be more rapid with only 1         kidney.       - Some type of kidney replacement therapy, either kidney transplant or dialysis, is         required when reaching ESKD.    Potential medical or surgical risks include, but not limited to:       - Death.       - Scars, pain, fatigue, and other consequences typical of any surgical procedure.        - Decreased kidney function.       - Abdominal or bowel symptoms, such as bloating and nausea, and developing         bowel obstruction.       - Kidney failure (ESKD) and the need for a kidney transplant or dialysis for the donor.       - Impact of obesity, hypertension, or other donor-specific medical conditions on         morbidity and mortality of the potential donor.    Patients overall evaluation will be discussed with the transplant team and a final recommendation on the patients' suitability to be a kidney transplant donor will be made at that time.    Consult:  Jazzy Agosto was seen in consultation at the request of Dr. Enoc Lunsford for evaluation as a potential kidney transplant donor.    Reason for Visit:  Jazzy Agosto is a 53 year old female who presents for a kidney donor evaluation.  Patient would like to be a non-directed donor.    Present Condition and Donor-Related Medical History:   Patient reports her brother previously donated a part of his liver about 10 years ago and more recently donated a kidney and is doing well.  She has thought about being a donor and is now interested in doing so.     Energy level is good and has been normal.  She is active and does get some exercise.  Denies any chest pain or shortness of breath with exertion.  Appetite is good and no recent weight change.  No nausea, vomiting or diarrhea.  No fever, sweats or chills.  No leg swelling.  No pain or burning with urination.          Kidney Disease Hx:       h/o Kidney Problems: No   Family h/o Genetic Kidney Disease: No       h/o Hypertension: No      Usual Blood Pressure:  Variable       h/o Protein in Urine: No    h/o Blood in Urine: No       h/o Kidney Stones: No     h/o Kidney Injury: No       h/o Recurrent UTI: No   h/o Genitourinary Problems: No       h/o Chronic NSAID Use: No         Other Medical Hx:       h/o Diabetes: No             h/o Gastrointestinal, Pancreas or Liver Problems: No       h/o Lung  or Heart Problems: No       h/o Hematologic Problems: No  h/o Bleeding or Clotting Problems: No       h/o Cancer: No       h/o Infection Problems: No       H/o Gestational DM: No      H/o Gestational HTN: No     H/o Preeclampsia: No         Skin Cancer Risk: Patient with a h/o dysplastic nevus, but no skin cancer.  Follows regularly with Dermatology, last 11/2023.       h/o more than 50 moles: Yes        h/o extensive sun exposure: No       h/o melanoma: No       Family h/o melanoma: No         Mental Health Assessment:       h/o Depression: Yes: Situational at times, but not lately       h/o Psychiatric Illness: No       h/o Suicidal Attempt(s): No    COVID Status:  Vaccination Up To Date: Yes  H/o COVID Infection: Yes; No residual symptoms     Review Of Systems:   A comprehensive review of systems was obtained and negative, except as noted in the HPI or PMH.    Past Medical History:   History was taken from the patient as noted below.  Past Medical History:   Diagnosis Date     DJD (degenerative joint disease) of knee      Dyslipidemia      Dysplastic nevus      Urinary tract infection        Past Social History:   Past Surgical History:   Procedure Laterality Date     NO HISTORY OF SURGERY       Family history of anesthesia problems: No  No personal history of anesthesia    Family History:   Family History   Problem Relation Age of Onset     Breast Cancer Mother      Diabetes Type 2  Father      Hypertension Father      Coronary Artery Disease Father      Kidney Disease Father           Specific Family History in First Degree Relatives:       FH of Kidney Dz: Yes   FH of Diabetes: Yes        FH of Hypertension: Yes  FH of CAD: Yes        FH of Cancer: Yes   FH of Kidney Cancer: No    Personal History:   Social History     Socioeconomic History     Marital status:      Spouse name: Not on file     Number of children: 2     Years of education: Not on file     Highest education level: Not on file    Occupational History     Not on file   Tobacco Use     Smoking status: Never     Smokeless tobacco: Never   Substance and Sexual Activity     Alcohol use: Never     Drug use: Never     Sexual activity: Not on file   Other Topics Concern     Not on file   Social History Narrative     Not on file     Social Determinants of Health     Financial Resource Strain: Not on file   Food Insecurity: Not on file   Transportation Needs: Not on file   Physical Activity: Not on file   Stress: Not on file   Social Connections: Not on file   Interpersonal Safety: Not on file   Housing Stability: Not on file          Specific Social History:       Health Insurance Status: Yes       Employment Status: Part time  Occupation: Nurse on a Med/Surg floor                       Living Arrangements: lives with their spouse       Social Support: Yes       Presence of increased risk for disease transmission behaviors as defined by [facility] guidelines: No        Allergies:  Allergies   Allergen Reactions     Sertraline Unknown       Medications:  No current outpatient medications on file.     No current facility-administered medications for this visit.     There are no discontinued medications.      Vitals:      1/18/2024     8:27 AM 1/18/2024     8:28 AM 1/18/2024     8:29 AM   Vital Signs   Systolic 115 126 121   Diastolic 45 84 64       Exam:   GENERAL APPEARANCE: alert and no distress  HENT: mouth without ulcers or lesions  RESP: lungs clear to auscultation - no rales, rhonchi or wheezes  CV: regular rhythm, normal rate, no rub, no murmur  EDEMA: no LE edema bilaterally  ABDOMEN: soft, nondistended, nontender, bowel sounds normal  MS: extremities normal - no gross deformities noted, no evidence of inflammation in joints, no muscle tenderness  SKIN: no rash    Results:   Labs and imaging were ordered for this visit and reviewed by me.  Recent Results (from the past 336 hour(s))   EBV Capsid Antibody IgM    Collection Time: 01/18/24  6:59 AM    Result Value Ref Range    EBV Capsid Shima IgM Instrument Value <10.0 <36.0 U/mL    EBV Capsid Antibody IgM No detectable antibody. No detectable antibody.   EBV Capsid Antibody IgG    Collection Time: 01/18/24  6:59 AM   Result Value Ref Range    EBV Capsid Shima IgG Instrument Value 200.0 (H) <18.0 U/mL    EBV Capsid Antibody IgG Positive (A) No detectable antibody.   CMV Antibody IgG    Collection Time: 01/18/24  6:59 AM   Result Value Ref Range    CMV Shima IgG Instrument Value <0.20 <0.60 U/mL    CMV Antibody IgG No detectable antibody. No detectable antibody.    HCG quantitative pregnancy    Collection Time: 01/18/24  6:59 AM   Result Value Ref Range    hCG Quantitative <1 <5 mIU/mL   West Nile Virus Antibody IgG IgM    Collection Time: 01/18/24  6:59 AM   Result Value Ref Range    West Nile IgG Serum 0.19 <=1.29 IV    West Nile IgM Serum 0.00 <=0.89 IV   Treponema Abs w Reflex to RPR and Titer    Collection Time: 01/18/24  6:59 AM   Result Value Ref Range    Treponema Antibody Total Nonreactive Nonreactive   HIV Antigen Antibody Combo Pretransplant Cascade    Collection Time: 01/18/24  6:59 AM   Result Value Ref Range    HIV Antigen Antibody Combo Pretransplant Nonreactive Nonreactive   Hepatitis C antibody    Collection Time: 01/18/24  6:59 AM   Result Value Ref Range    Hepatitis C Antibody Nonreactive Nonreactive   Hepatitis B surface antigen    Collection Time: 01/18/24  6:59 AM   Result Value Ref Range    Hepatitis B Surface Antigen Nonreactive Nonreactive   Hepatitis B Surface Antibody    Collection Time: 01/18/24  6:59 AM   Result Value Ref Range    Hepatitis B Surface Antibody Reactive     Hepatitis B Surface Antibody Instrument Value 517.00 <8.5 m[IU]/mL   Hepatitis B core antibody    Collection Time: 01/18/24  6:59 AM   Result Value Ref Range    Hepatitis B Core Antibody Total Nonreactive Nonreactive   Protein  random urine    Collection Time: 01/18/24  6:59 AM   Result Value Ref Range    Total Protein  Urine mg/dL <6.0   mg/dL    Total Protein Urine mg/mg Creat      Creatinine Urine mg/dL 42.0 mg/dL   Albumin Random Urine Quantitative with Creat Ratio    Collection Time: 01/18/24  6:59 AM   Result Value Ref Range    Creatinine Urine mg/dL 43.9 mg/dL    Albumin Urine mg/L <12.0 mg/L    Albumin Urine mg/g Cr     Routine UA with microscopic    Collection Time: 01/18/24  6:59 AM   Result Value Ref Range    Color Urine Straw Colorless, Straw, Light Yellow, Yellow    Appearance Urine Clear Clear    Glucose Urine Negative Negative mg/dL    Bilirubin Urine Negative Negative    Ketones Urine Negative Negative mg/dL    Specific Gravity Urine 1.005 1.003 - 1.035    Blood Urine Negative Negative    pH Urine 6.0 5.0 - 7.0    Protein Albumin Urine Negative Negative mg/dL    Urobilinogen Urine Normal Normal, 2.0 mg/dL    Nitrite Urine Negative Negative    Leukocyte Esterase Urine Negative Negative    Bacteria Urine Few (A) None Seen /HPF    RBC Urine 2 <=2 /HPF    WBC Urine 1 <=5 /HPF    Squamous Epithelials Urine 3 (H) <=1 /HPF   CBC with platelets    Collection Time: 01/18/24  6:59 AM   Result Value Ref Range    WBC Count 4.9 4.0 - 11.0 10e3/uL    RBC Count 5.02 3.80 - 5.20 10e6/uL    Hemoglobin 15.1 11.7 - 15.7 g/dL    Hematocrit 45.2 35.0 - 47.0 %    MCV 90 78 - 100 fL    MCH 30.1 26.5 - 33.0 pg    MCHC 33.4 31.5 - 36.5 g/dL    RDW 12.8 10.0 - 15.0 %    Platelet Count 233 150 - 450 10e3/uL   Partial thromboplastin time    Collection Time: 01/18/24  6:59 AM   Result Value Ref Range    aPTT 27 22 - 38 Seconds   INR    Collection Time: 01/18/24  6:59 AM   Result Value Ref Range    INR 1.13 0.85 - 1.15   Hemoglobin A1c    Collection Time: 01/18/24  6:59 AM   Result Value Ref Range    Hemoglobin A1C 5.5 <5.7 %   Phosphorus    Collection Time: 01/18/24  6:59 AM   Result Value Ref Range    Phosphorus 3.4 2.5 - 4.5 mg/dL   Uric acid    Collection Time: 01/18/24  6:59 AM   Result Value Ref Range    Uric Acid 4.6 2.4 - 5.7 mg/dL    Lipid Profile    Collection Time: 01/18/24  6:59 AM   Result Value Ref Range    Cholesterol 204 (H) <200 mg/dL    Triglycerides 74 <150 mg/dL    Direct Measure HDL 44 (L) >=50 mg/dL    LDL Cholesterol Calculated 145 (H) <=100 mg/dL    Non HDL Cholesterol 160 (H) <130 mg/dL    Patient Fasting > 8hrs? Yes    Comprehensive metabolic panel    Collection Time: 01/18/24  6:59 AM   Result Value Ref Range    Sodium 140 135 - 145 mmol/L    Potassium 3.8 3.4 - 5.3 mmol/L    Carbon Dioxide (CO2) 25 22 - 29 mmol/L    Anion Gap 10 7 - 15 mmol/L    Urea Nitrogen 9.7 6.0 - 20.0 mg/dL    Creatinine 0.77 0.51 - 0.95 mg/dL    GFR Estimate >90 >60 mL/min/1.73m2    Calcium 9.2 8.6 - 10.0 mg/dL    Chloride 105 98 - 107 mmol/L    Glucose 91 70 - 99 mg/dL    Alkaline Phosphatase 80 40 - 150 U/L    AST 19 0 - 45 U/L    ALT 11 0 - 50 U/L    Protein Total 7.5 6.4 - 8.3 g/dL    Albumin 4.5 3.5 - 5.2 g/dL    Bilirubin Total 0.4 <=1.2 mg/dL   Quantiferon TB Gold Plus Grey Tube    Collection Time: 01/18/24  6:59 AM    Specimen: Arm, Right; Blood   Result Value Ref Range    Quantiferon Nil Tube 0.02 IU/mL   Quantiferon TB Gold Plus Green Tube    Collection Time: 01/18/24  6:59 AM    Specimen: Arm, Right; Blood   Result Value Ref Range    Quantiferon TB1 Tube 0.04 IU/mL   Quantiferon TB Gold Plus Yellow Tube    Collection Time: 01/18/24  6:59 AM    Specimen: Arm, Right; Blood   Result Value Ref Range    Quantiferon TB2 Tube 0.03    Quantiferon TB Gold Plus Purple Tube    Collection Time: 01/18/24  6:59 AM    Specimen: Arm, Right; Blood   Result Value Ref Range    Quantiferon Mitogen 10.00 IU/mL   Adult Type and Screen    Collection Time: 01/18/24  6:59 AM   Result Value Ref Range    ABO/RH(D) AB POS     Antibody Screen Negative Negative    SPECIMEN EXPIRATION DATE [id number]    Quantiferon TB Gold Plus    Collection Time: 01/18/24  6:59 AM    Specimen: Arm, Right; Blood   Result Value Ref Range    Quantiferon-TB Gold Plus Negative Negative     TB1 Ag minus Nil Value 0.02 IU/mL    TB2 Ag minus Nil Value 0.01 IU/mL    Mitogen minus Nil Result 9.98 IU/mL    Nil Result 0.02 IU/mL   ABO and Rh 2nd type and screen required    Collection Time: 01/18/24  7:34 AM   Result Value Ref Range    ABO/RH(D) AB POS     SPECIMEN EXPIRATION DATE [id number]    Iohexol 1st Order    Collection Time: 01/18/24  9:32 AM   Result Value Ref Range    Iohexol Time 1 7.44 mg/dL    Iohexol Time 2 3.54 mg/dL    Iohexol Body Surface Area 1.914 m2    Iohexol Raw Clearance 100 mL/min    Iohexol Std Clearance 91 /1.73 m2   EKG 12-lead complete w/read - Clinics    Collection Time: 01/18/24 12:39 PM   Result Value Ref Range    Systolic Blood Pressure  mmHg    Diastolic Blood Pressure  mmHg    Ventricular Rate 59 BPM    Atrial Rate 59 BPM    [place] Interval 182 ms    QRS Duration 76 ms     ms    QTc 423 ms    P Axis 64 degrees    R AXIS 14 degrees    T Axis 17 degrees    Interpretation ECG       Sinus bradycardia  Otherwise normal ECG  No previous ECGs available  Confirmed by MD GEO, CHERYL ([id number]) on 1/19/2024 3:00:53 PM               Again, thank you for allowing me to participate in the care of your patient.        Sincerely,        Adalberto Weston MD

## 2024-01-18 NOTE — LETTER
1/18/2024       RE: Jazzy Agosto  [address] [facility] [address]     Dear Colleague,    Thank you for referring your patient, Jazzy Agosto, to the [facility] TRANSPLANT [facility]INIC at [facility]. Please see a copy of my visit note below.    TRANSPLANT NEPHROLOGY DONOR EVALUATION    Assessment and Plan:  # Prospective Kidney Transplant Donor: Patient with no issues that need to be addressed prior to donation. Patient's blood pressure is acceptable at this visit, kidney function appears to be acceptable with Iohexol pending, and urinalysis is bland.    Discussed the risks of donating a kidney, including the surgical risk and the possible risks of living with one kidney.    Education about expected post-donation kidney function and how chronic kidney disease (CKD) and end stage kidney disease (ESKD) might potentially impact the donor in the future, include, but not limited to:       - On average, donors will have 25-35% permanent loss of kidney function at donation.       - Baseline risk of ESKD may slightly exceed that of members of the general        population with the same demographic profile.       - Donor risks must be interpreted in light of known epidemiology of both CKD or         ESKD, such as that CKD generally develops in midlife (40-50 years old) and ESKD         generally develops after age 60.       - Medical evaluation of young potential donors cannot predict lifetime risk of CKD or         ESKD.       - Donors may be at higher risk for CKD if they sustain damage to the remaining         kidney.       - Development of CKD and progression of ESKD may be more rapid with only 1         kidney.       - Some type of kidney replacement therapy, either kidney transplant or dialysis, is         required when reaching ESKD.    Potential medical or surgical risks include, but not limited to:       - Death.       - Scars, pain, fatigue, and other  consequences typical of any surgical procedure.       - Decreased kidney function.       - Abdominal or bowel symptoms, such as bloating and nausea, and developing         bowel obstruction.       - Kidney failure (ESKD) and the need for a kidney transplant or dialysis for the donor.       - Impact of obesity, hypertension, or other donor-specific medical conditions on         morbidity and mortality of the potential donor.    Patients overall evaluation will be discussed with the transplant team and a final recommendation on the patients' suitability to be a kidney transplant donor will be made at that time.    Consult:  Jazzy Agosto was seen in consultation at the request of Dr. Enoc Lunsford for evaluation as a potential kidney transplant donor.    Reason for Visit:  Jazzy Agosto is a 53 year old female who presents for a kidney donor evaluation.  Patient would like to be a non-directed donor.    Present Condition and Donor-Related Medical History:   Patient reports her brother previously donated a part of his liver about 10 years ago and more recently donated a kidney and is doing well.  She has thought about being a donor and is now interested in doing so.     Energy level is good and has been normal.  She is active and does get some exercise.  Denies any chest pain or shortness of breath with exertion.  Appetite is good and no recent weight change.  No nausea, vomiting or diarrhea.  No fever, sweats or chills.  No leg swelling.  No pain or burning with urination.          Kidney Disease Hx:       h/o Kidney Problems: No   Family h/o Genetic Kidney Disease: No       h/o Hypertension: No      Usual Blood Pressure:  Variable       h/o Protein in Urine: No    h/o Blood in Urine: No       h/o Kidney Stones: No     h/o Kidney Injury: No       h/o Recurrent UTI: No   h/o Genitourinary Problems: No       h/o Chronic NSAID Use: No         Other Medical Hx:       h/o Diabetes: No             h/o  Gastrointestinal, Pancreas or Liver Problems: No       h/o Lung or Heart Problems: No       h/o Hematologic Problems: No  h/o Bleeding or Clotting Problems: No       h/o Cancer: No       h/o Infection Problems: No       H/o Gestational DM: No      H/o Gestational HTN: No     H/o Preeclampsia: No         Skin Cancer Risk: Patient with a h/o dysplastic nevus, but no skin cancer.  Follows regularly with Dermatology, last 11/2023.       h/o more than 50 moles: Yes        h/o extensive sun exposure: No       h/o melanoma: No       Family h/o melanoma: No         Mental Health Assessment:       h/o Depression: Yes: Situational at times, but not lately       h/o Psychiatric Illness: No       h/o Suicidal Attempt(s): No    COVID Status:  Vaccination Up To Date: Yes  H/o COVID Infection: Yes; No residual symptoms     Review Of Systems:   A comprehensive review of systems was obtained and negative, except as noted in the HPI or PMH.    Past Medical History:   History was taken from the patient as noted below.  Past Medical History:   Diagnosis Date     DJD (degenerative joint disease) of knee      Dyslipidemia      Dysplastic nevus      Urinary tract infection        Past Social History:   Past Surgical History:   Procedure Laterality Date     NO HISTORY OF SURGERY       Family history of anesthesia problems: No  No personal history of anesthesia    Family History:   Family History   Problem Relation Age of Onset     Breast Cancer Mother      Diabetes Type 2  Father      Hypertension Father      Coronary Artery Disease Father      Kidney Disease Father           Specific Family History in First Degree Relatives:       FH of Kidney Dz: Yes   FH of Diabetes: Yes        FH of Hypertension: Yes  FH of CAD: Yes        FH of Cancer: Yes   FH of Kidney Cancer: No    Personal History:   Social History     Socioeconomic History     Marital status:      Spouse name: Not on file     Number of children: 2     Years of education:  Not on file     Highest education level: Not on file   Occupational History     Not on file   Tobacco Use     Smoking status: Never     Smokeless tobacco: Never   Substance and Sexual Activity     Alcohol use: Never     Drug use: Never     Sexual activity: Not on file   Other Topics Concern     Not on file   Social History Narrative     Not on file     Social Determinants of Health     Financial Resource Strain: Not on file   Food Insecurity: Not on file   Transportation Needs: Not on file   Physical Activity: Not on file   Stress: Not on file   Social Connections: Not on file   Interpersonal Safety: Not on file   Housing Stability: Not on file          Specific Social History:       Health Insurance Status: Yes       Employment Status: Part time  Occupation: Nurse on a Med/Surg floor                       Living Arrangements: lives with their spouse       Social Support: Yes       Presence of increased risk for disease transmission behaviors as defined by PHS guidelines: No        Allergies:  Allergies   Allergen Reactions     Sertraline Unknown       Medications:  No current outpatient medications on file.     No current facility-administered medications for this visit.     There are no discontinued medications.      Vitals:      1/18/2024     8:27 AM 1/18/2024     8:28 AM 1/18/2024     8:29 AM   Vital Signs   Systolic 115 126 121   Diastolic 45 84 64       Exam:   GENERAL APPEARANCE: alert and no distress  HENT: mouth without ulcers or lesions  RESP: lungs clear to auscultation - no rales, rhonchi or wheezes  CV: regular rhythm, normal rate, no rub, no murmur  EDEMA: no LE edema bilaterally  ABDOMEN: soft, nondistended, nontender, bowel sounds normal  MS: extremities normal - no gross deformities noted, no evidence of inflammation in joints, no muscle tenderness  SKIN: no rash    Results:   Labs and imaging were ordered for this visit and reviewed by me.  Recent Results (from the past 336 hour(s))   EBV Capsid  Antibody IgM    Collection Time: 01/18/24  6:59 AM   Result Value Ref Range    EBV Capsid Shima IgM Instrument Value <10.0 <36.0 U/mL    EBV Capsid Antibody IgM No detectable antibody. No detectable antibody.   EBV Capsid Antibody IgG    Collection Time: 01/18/24  6:59 AM   Result Value Ref Range    EBV Capsid Shima IgG Instrument Value 200.0 (H) <18.0 U/mL    EBV Capsid Antibody IgG Positive (A) No detectable antibody.   CMV Antibody IgG    Collection Time: 01/18/24  6:59 AM   Result Value Ref Range    CMV Shima IgG Instrument Value <0.20 <0.60 U/mL    CMV Antibody IgG No detectable antibody. No detectable antibody.    HCG quantitative pregnancy    Collection Time: 01/18/24  6:59 AM   Result Value Ref Range    hCG Quantitative <1 <5 mIU/mL   West Nile Virus Antibody IgG IgM    Collection Time: 01/18/24  6:59 AM   Result Value Ref Range    West Nile IgG Serum 0.19 <=1.29 IV    West Nile IgM Serum 0.00 <=0.89 IV   Treponema Abs w Reflex to RPR and Titer    Collection Time: 01/18/24  6:59 AM   Result Value Ref Range    Treponema Antibody Total Nonreactive Nonreactive   HIV Antigen Antibody Combo Pretransplant Cascade    Collection Time: 01/18/24  6:59 AM   Result Value Ref Range    HIV Antigen Antibody Combo Pretransplant Nonreactive Nonreactive   Hepatitis C antibody    Collection Time: 01/18/24  6:59 AM   Result Value Ref Range    Hepatitis C Antibody Nonreactive Nonreactive   Hepatitis B surface antigen    Collection Time: 01/18/24  6:59 AM   Result Value Ref Range    Hepatitis B Surface Antigen Nonreactive Nonreactive   Hepatitis B Surface Antibody    Collection Time: 01/18/24  6:59 AM   Result Value Ref Range    Hepatitis B Surface Antibody Reactive     Hepatitis B Surface Antibody Instrument Value 517.00 <8.5 m[IU]/mL   Hepatitis B core antibody    Collection Time: 01/18/24  6:59 AM   Result Value Ref Range    Hepatitis B Core Antibody Total Nonreactive Nonreactive   Protein  random urine    Collection Time: 01/18/24   6:59 AM   Result Value Ref Range    Total Protein Urine mg/dL <6.0   mg/dL    Total Protein Urine mg/mg Creat      Creatinine Urine mg/dL 42.0 mg/dL   Albumin Random Urine Quantitative with Creat Ratio    Collection Time: 01/18/24  6:59 AM   Result Value Ref Range    Creatinine Urine mg/dL 43.9 mg/dL    Albumin Urine mg/L <12.0 mg/L    Albumin Urine mg/g Cr     Routine UA with microscopic    Collection Time: 01/18/24  6:59 AM   Result Value Ref Range    Color Urine Straw Colorless, Straw, Light Yellow, Yellow    Appearance Urine Clear Clear    Glucose Urine Negative Negative mg/dL    Bilirubin Urine Negative Negative    Ketones Urine Negative Negative mg/dL    Specific Gravity Urine 1.005 1.003 - 1.035    Blood Urine Negative Negative    pH Urine 6.0 5.0 - 7.0    Protein Albumin Urine Negative Negative mg/dL    Urobilinogen Urine Normal Normal, 2.0 mg/dL    Nitrite Urine Negative Negative    Leukocyte Esterase Urine Negative Negative    Bacteria Urine Few (A) None Seen /HPF    RBC Urine 2 <=2 /HPF    WBC Urine 1 <=5 /HPF    Squamous Epithelials Urine 3 (H) <=1 /HPF   CBC with platelets    Collection Time: 01/18/24  6:59 AM   Result Value Ref Range    WBC Count 4.9 4.0 - 11.0 10e3/uL    RBC Count 5.02 3.80 - 5.20 10e6/uL    Hemoglobin 15.1 11.7 - 15.7 g/dL    Hematocrit 45.2 35.0 - 47.0 %    MCV 90 78 - 100 fL    MCH 30.1 26.5 - 33.0 pg    MCHC 33.4 31.5 - 36.5 g/dL    RDW 12.8 10.0 - 15.0 %    Platelet Count 233 150 - 450 10e3/uL   Partial thromboplastin time    Collection Time: 01/18/24  6:59 AM   Result Value Ref Range    aPTT 27 22 - 38 Seconds   INR    Collection Time: 01/18/24  6:59 AM   Result Value Ref Range    INR 1.13 0.85 - 1.15   Hemoglobin A1c    Collection Time: 01/18/24  6:59 AM   Result Value Ref Range    Hemoglobin A1C 5.5 <5.7 %   Phosphorus    Collection Time: 01/18/24  6:59 AM   Result Value Ref Range    Phosphorus 3.4 2.5 - 4.5 mg/dL   Uric acid    Collection Time: 01/18/24  6:59 AM   Result  Value Ref Range    Uric Acid 4.6 2.4 - 5.7 mg/dL   Lipid Profile    Collection Time: 01/18/24  6:59 AM   Result Value Ref Range    Cholesterol 204 (H) <200 mg/dL    Triglycerides 74 <150 mg/dL    Direct Measure HDL 44 (L) >=50 mg/dL    LDL Cholesterol Calculated 145 (H) <=100 mg/dL    Non HDL Cholesterol 160 (H) <130 mg/dL    Patient Fasting > 8hrs? Yes    Comprehensive metabolic panel    Collection Time: 01/18/24  6:59 AM   Result Value Ref Range    Sodium 140 135 - 145 mmol/L    Potassium 3.8 3.4 - 5.3 mmol/L    Carbon Dioxide (CO2) 25 22 - 29 mmol/L    Anion Gap 10 7 - 15 mmol/L    Urea Nitrogen 9.7 6.0 - 20.0 mg/dL    Creatinine 0.77 0.51 - 0.95 mg/dL    GFR Estimate >90 >60 mL/min/1.73m2    Calcium 9.2 8.6 - 10.0 mg/dL    Chloride 105 98 - 107 mmol/L    Glucose 91 70 - 99 mg/dL    Alkaline Phosphatase 80 40 - 150 U/L    AST 19 0 - 45 U/L    ALT 11 0 - 50 U/L    Protein Total 7.5 6.4 - 8.3 g/dL    Albumin 4.5 3.5 - 5.2 g/dL    Bilirubin Total 0.4 <=1.2 mg/dL   Quantiferon TB Gold Plus Grey Tube    Collection Time: 01/18/24  6:59 AM    Specimen: Arm, Right; Blood   Result Value Ref Range    Quantiferon Nil Tube 0.02 IU/mL   Quantiferon TB Gold Plus Green Tube    Collection Time: 01/18/24  6:59 AM    Specimen: Arm, Right; Blood   Result Value Ref Range    Quantiferon TB1 Tube 0.04 IU/mL   Quantiferon TB Gold Plus Yellow Tube    Collection Time: 01/18/24  6:59 AM    Specimen: Arm, Right; Blood   Result Value Ref Range    Quantiferon TB2 Tube 0.03    Quantiferon TB Gold Plus Purple Tube    Collection Time: 01/18/24  6:59 AM    Specimen: Arm, Right; Blood   Result Value Ref Range    Quantiferon Mitogen 10.00 IU/mL   Adult Type and Screen    Collection Time: 01/18/24  6:59 AM   Result Value Ref Range    ABO/RH(D) AB POS     Antibody Screen Negative Negative    SPECIMEN EXPIRATION DATE [id number]    Quantiferon TB Gold Plus    Collection Time: 01/18/24  6:59 AM    Specimen: Arm, Right; Blood   Result Value Ref  Range    Quantiferon-TB Gold Plus Negative Negative    TB1 Ag minus Nil Value 0.02 IU/mL    TB2 Ag minus Nil Value 0.01 IU/mL    Mitogen minus Nil Result 9.98 IU/mL    Nil Result 0.02 IU/mL   ABO and Rh 2nd type and screen required    Collection Time: 01/18/24  7:34 AM   Result Value Ref Range    ABO/RH(D) AB POS     SPECIMEN EXPIRATION DATE [id number]    Iohexol 1st Order    Collection Time: 01/18/24  9:32 AM   Result Value Ref Range    Iohexol Time 1 7.44 mg/dL    Iohexol Time 2 3.54 mg/dL    Iohexol Body Surface Area 1.914 m2    Iohexol Raw Clearance 100 mL/min    Iohexol Std Clearance 91 /1.73 m2   EKG 12-lead complete w/read - Clinics    Collection Time: 01/18/24 12:39 PM   Result Value Ref Range    Systolic Blood Pressure  mmHg    Diastolic Blood Pressure  mmHg    Ventricular Rate 59 BPM    Atrial Rate 59 BPM    NE Interval 182 ms    QRS Duration 76 ms     ms    QTc 423 ms    P Axis 64 degrees    R AXIS 14 degrees    T Axis 17 degrees    Interpretation ECG       Sinus bradycardia  Otherwise normal ECG  No previous ECGs available  Confirmed by MD GEO, CHERYL ([id number]) on 1/19/2024 3:00:53 PM               Again, thank you for allowing me to participate in the care of your patient.      Sincerely,    Adalberto Weston MD

## 2024-01-18 NOTE — PROGRESS NOTES
"Psychosocial Evaluation  Living Organ Donation per OPTN Policy 14.1.A  Organ Type: Living kidney donor-NDD  Presenting Information:  Jazzy presents to the [facility] Solid Organ Transplant Clinic to complete a psychosocial evaluation since she is interested in becoming a living kidney donor-non directed.  PERSONAL BACKGROUND:  Current Living Situation: Jazzy lives in a single family home with her  and pets. She has two adult children who go to college locally.    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 She has PTO and FMLA and no major concerns about taking time way from work for donation.     Health Insurance Status: yes, through employer    Family History: , two adult children    General Health: Jazzy describes herself as \"healthy\". She states she fractured her knee last year due to arthritis but is able to manage that well. She has a primary care doctor through [facility] in [place].    Mental Health: Jazzy states she has struggled with depression and anxiety in the past but both are \"situational and manageable\". She says that she sees a therapist once a month just to \"keep one in her back pocket\". The donor denies any past or present treatment for bipolar disorder, or disorders of thought such as schizophrenia or schizoaffective disorder.  There is no history of personality disorder or eating disorders. The donor denies any need to see a counselor or therapist at this time. The donor denies any past suicidal ideation, plans, or past attempts. The donor denies any use of psychotropic medications at this time or in the past. The donor denies any past history of hospitalization for psychiatric illnesses. The donor denies any past history of ADHD or ADD. The donor denies any history of educational issues or need for special educational services in their past " history.     Alcohol and Drug Use/Abuse/Dependency: Jazzy reports that she consumes approximately 0 servings of alcohol per week ( a serving is defined here as one, 12 oz beer, or one 4 oz glass of wine, or one 1 /2 oz of hard liquor).  The donor denies any past history of abuse or dependency on alcohol or illicit drugs. The donor denies any current use of street drugs, including marijuana, vaping, edible marijuana, or other mood altering substances.  The donor denies any past history of negative consequences of use of alcohol or drugs such as a DUI, relationship problems, problems with fulfilling parenting or other care giving responsibilities, or problems with work performance.         Cigarette Use: none    Legal: none    Coping with major surgery/associated stress:  She states she was a runner before the fractured her knee in 2022 so is trying to figure out what exercise can be for her.     Support System: She has a supportive  and brother.    DONOR SPECIFIC INFORMATION:  Relationship to Recipient: NDD    Decision Process/Motivation to Donate: Jazzy shares her brother was living organ donor last year and spoke highly/favorably of the experience.     High risk behaviors as defined by [place] Public Health Services (PHS) that have potential to increase risk of disease transmission were reviewed and no risks identified.     PREPARATION FOR DONATION, RECOVERY, AND POTENTIAL SHORT-LONG-TERM OUTCOMES:  Understanding of the Living Donation Process:  We discussed the role of living donor .  Short and long term medical and psychosocial risks to both, donor and recipient were reviewed and she expressed understanding.  Post surgical restrictions (2 weeks no driving, 6 weeks no lifting over 10 lbs) were reviewed and she appears capable of adhering to the post surgical requirements. The need for a caregiver was discussed and has a supportive spouse and family .  The risk of poor psychosocial outcome  including problems with body image, post-surgery depression or anxiety, or feelings of emotional distress or bereavement if recipient experiences any recurrent disease, poor outcome or death was reviewed.  Additionally, potential financial implications, including the risk of having difficulty obtaining health care insurance, life insurance, disability insurance, or long term care insurance were reviewed, as were available donor grants to assist with donor related expenses.      We also discussed some unique issues that arise with paired kidney donation, which include the uncertainty of the timing and the importance of having a employment situation and support system that is able to provide sustained support and flexibility.    She appears capable of understanding this information and making an informed medical decision.    Impressions/Recommendations:   Jazzy is highly motivated to donate a kidney as a NDD.  Her decision to donate is free of inducement, coercion, or other undue pressure. Her housing, finances and employment are stable.  No current/active mental health or chemical abuse issues were identified.  The need for a caregiver was reviewed and she is able to identify a plan to meet her post operative care needs.  She appears capable of making an informed medical decision.  No psychosocial contraindications to living organ donation were identified and  I support Jazzy s desire to donate a kidney as a NDD.         Contact Information:   FATOU Pearce, ENEDINA  Living Donor   Phone: [phone]  Pager: [phone]  [email or web address]      Time Spent: 40 minutes

## 2024-01-18 NOTE — NURSING NOTE
"Chief Complaint   Patient presents with    Transplant Donor Evaluation     Kidney donor evaluation       BP 1: 143/78  BP 2: 115/45  BP 3: 126/84    /64   Pulse 78   Temp 98  F (36.7  C) (Oral)   Ht 1.64 m (5' 4.57\")   Wt 78.5 kg (173 lb)   SpO2 96%   BMI 29.18 kg/m      Juan Manuel Gu RN on 1/18/2024 at 8:26 AM    "

## 2024-01-18 NOTE — LETTER
1/18/2024         RE: Jazzy Agosto  [address] [address] [address]        Dear Colleague,    Thank you for referring your patient, Jazzy Agosto, to the [facility] TRANSPLANT CLINIC. Please see a copy of my visit note below.    [facility] SOLID ORGAN TRANSPLANT  OUTPATIENT MNT: KIDNEY DONOR EVALUATION     Current BMI: 29.1 (HT 64.5 in,  lbs/79 kg)    8 Year Estimated Risk of T2DM  </= 3%     TIME SPENT: 15 minutes  VISIT TYPE: Initial  REFERRING PHYSICIAN: Mj   PT ACCOMPANIED BY: self     NUTRITION ASSESSMENT  H/o kidney stones: none   Parental h/o DM: dad   H/o GDM or HTN in pregnancy (if applicable): none     Vitamins, Supplements, Pertinent Meds: none   Herbal Medicines/Supplements: none   Protein Supplement: none     Weight hx: trying to lose weight due to pt reported borderline BP for a while; wt is within 10 lbs     PHYSICAL ACTIVITY   Hurt knee 1 yr ago- used to run, snow shoe, but knee is still improving so taking it easy  Currently walks on the treadmill 4-5x/week (60 min)    FOOD SECURITY: any concerns about having enough money to buy food or access to grocery stores? no    DIET RECALL  Breakfast Toast w/ PBJ   Lunch Frozen meal (Lean Cuisine or Healthy Choice) + yogurt + banana    Dinner Chili; pulled pork // does report veggie intake a few times/day   Snacks PM- granola bar or nut mix    Beverages Coffee w/ splash of creamer, Diet Coke (0-1/day), water (60 oz/day)   Alcohol None    Dining out 1-2x/week      LABS  Recent Labs   Lab Test 01/18/24  0659   CHOL 204*   HDL 44*   *   TRIG 74       FBG = 91  A1c = 5.5  BP = 115/45, 126/84, 121/64     Prediction of Incident Diabetes Mellitus in Middle-aged Adults: The [place] Offspring Study  Oliver Power MD; James B. Meigs, MD, MPH; Sharon Boyer, PhD; Dhara Moreno MD, MPH; Otilio Matias MD; Nolberto Gan Sr,   PhD  Pt's estimated risk for T2DM (per Table 6 above)  Pt received points  for the following criteria: BMI>25, low HDL, dad w/ DM   Total points: 10  8-Year estimated risk of T2DM: </= 3%    NUTRITION DIAGNOSIS  No nutrition diagnosis identified at this time.    NUTRITION INTERVENTION  Nutrition education provided:  Reviewed overall healthy diet guidelines for pre and post kidney donation. Discussed maintenance of a healthy weight and Na+ intake <3000 mg/day (<2000 mg/day if HTN).    Avoid the following post op d/t unknown effects on the organs:  - Herbal, Chinese, holistic, chiropractic, natural, alternative medicines and supplements  - Detoxes and cleanses  - Weight loss pills  - Protein powders or other products with extracts or herbs (ie green tea extract)    Patient Understanding: Pt verbalized understanding of education provided.  Expected Engagement: Good  Follow-Up Plans: PRN     NUTRITION GOALS  No nutrition goals identified at this time     Fatemeh Urban RD, LD, CCTD                                  Again, thank you for allowing me to participate in the care of your patient.        Sincerely,        Fatemeh Urban RD

## 2024-01-18 NOTE — PROGRESS NOTES
[facility]  OUTPATIENT MNT: KIDNEY DONOR EVALUATION     Current BMI: 29.1 (HT 64.5 in,  lbs/79 kg)    8 Year Estimated Risk of T2DM  </= 3%     TIME SPENT: 15 minutes  VISIT TYPE: Initial  REFERRING PHYSICIAN: Mj   PT ACCOMPANIED BY: self     NUTRITION ASSESSMENT  H/o kidney stones: none   Parental h/o DM: dad   H/o GDM or HTN in pregnancy (if applicable): none     Vitamins, Supplements, Pertinent Meds: none   Herbal Medicines/Supplements: none   Protein Supplement: none     Weight hx: trying to lose weight due to pt reported borderline BP for a while; wt is within 10 lbs     PHYSICAL ACTIVITY   Hurt knee 1 yr ago- used to run, snow shoe, but knee is still improving so taking it easy  Currently walks on the treadmill 4-5x/week (60 min)    FOOD SECURITY: any concerns about having enough money to buy food or access to grocery stores? no    DIET RECALL  Breakfast Toast w/ PBJ   Lunch Frozen meal (Lean Cuisine or Healthy Choice) + yogurt + banana    Dinner Chili; pulled pork // does report veggie intake a few times/day   Snacks PM- granola bar or nut mix    Beverages Coffee w/ splash of creamer, Diet Coke (0-1/day), water (60 oz/day)   Alcohol None    Dining out 1-2x/week      LABS  Recent Labs   Lab Test 01/18/24  0659   CHOL 204*   HDL 44*   *   TRIG 74       FBG = 91  A1c = 5.5  BP = 115/45, 126/84, 121/64     Prediction of Incident Diabetes Mellitus in Middle-aged Adults: The [place] Offspring Study  Oliver Power MD; James B. Meigs, MD, MPH; Sharon Boyer, PhD; Dhara Moreno MD, MPH; Otilio Matias MD; Nolberto Gan Sr,   PhD  Pt's estimated risk for T2DM (per Table 6 above)  Pt received points for the following criteria: BMI>25, low HDL, dad w/ DM   Total points: 10  8-Year estimated risk of T2DM: </= 3%    NUTRITION DIAGNOSIS  No nutrition diagnosis identified at this time.    NUTRITION INTERVENTION  Nutrition education provided:  Reviewed  overall healthy diet guidelines for pre and post kidney donation. Discussed maintenance of a healthy weight and Na+ intake <3000 mg/day (<2000 mg/day if HTN).    Avoid the following post op d/t unknown effects on the organs:  - Herbal, Chinese, holistic, chiropractic, natural, alternative medicines and supplements  - Detoxes and cleanses  - Weight loss pills  - Protein powders or other products with extracts or herbs (ie green tea extract)    Patient Understanding: Pt verbalized understanding of education provided.  Expected Engagement: Good  Follow-Up Plans: PRN     NUTRITION GOALS  No nutrition goals identified at this time     Fatemeh Urban, RD, LD, CCTD

## 2024-01-18 NOTE — PROGRESS NOTES
"Chief Complaint   Patient presents with    Iohexol     Iohexol Timed Test Nursing Note    Patient comes to [facility] today for a Iohexol GFR Timed test.   Orders from Dr. Mejia were completed.    Progress Note  Height: 5'4.5\"  Weight: 173 lb  Age: 53  Gender: female    The following information was verified with the patient:  *Female patients are not pregnant or could not have become recently pregnant: No  *Is there a history of allergy (skin rash, swelling, ect) to :   a. Iodine (except skin reactions to Betadine): NO   b. Intravenous radio-contrast agents: NO   c. Seafood: NO     RN provided patient with educational handout regarding timed test. YES    Medication administered :   Iohexol (Omnipaque 350 mg Iodine/ ml concentration) 4 mls.  Site administered left AC  Start time 0730  Stop time 0732    Blood draws were taken from left AC- second ABO.  Baseline (Time 0) 0732: the time immediately after the injection is completed.  (2 Hour) 0932  (4 hour) 1132      Patient tolerated the procedure well    Discharge Plan  Discharge instructions reviewed with patient: YES  Discharge papers printed and given to patient: YES  Patient/Representative verbalized understanding, all questions answered: YES    Discharged from unit at 0735 with whom: self to Transplant Clinic.    Administrations This Visit       iohexol (OMNIPAQUE) 350 MG/ML injectable solution 4 mL       Admin Date  01/18/2024 Action  $Given Dose  4 mL Route  Intravenous Documented By  Shelby Arellano, RN                    "

## 2024-01-18 NOTE — NURSING NOTE
Chief Complaint   Patient presents with    Blood Draw     Labs drawn with PIV start by RN. Pt tolerated well. Vitals taken. Patient checked into next appointment.       Janice Kelly RN

## 2024-01-19 LAB
ATRIAL RATE - MUSE: 59 BPM
CREAT UR-MCNC: 43.9 MG/DL
DIASTOLIC BLOOD PRESSURE - MUSE: NORMAL MMHG
EBV VCA IGM SER IA-ACNC: <10 U/ML
EBV VCA IGM SER IA-ACNC: NORMAL
GAMMA INTERFERON BACKGROUND BLD IA-ACNC: 0.02 IU/ML
HBV CORE AB SERPL QL IA: NONREACTIVE
HBV SURFACE AB SERPL IA-ACNC: 517 M[IU]/ML
HBV SURFACE AB SERPL IA-ACNC: REACTIVE M[IU]/ML
HBV SURFACE AG SERPL QL IA: NONREACTIVE
HCV AB SERPL QL IA: NONREACTIVE
HIV 1+2 AB+HIV1 P24 AG SERPL QL IA: NONREACTIVE
INTERPRETATION ECG - MUSE: NORMAL
M TB IFN-G BLD-IMP: NEGATIVE
M TB IFN-G CD4+ BCKGRND COR BLD-ACNC: 9.98 IU/ML
MICROALBUMIN UR-MCNC: <12 MG/L
MICROALBUMIN/CREAT UR: NORMAL MG/G{CREAT}
MITOGEN IGNF BCKGRD COR BLD-ACNC: 0.01 IU/ML
MITOGEN IGNF BCKGRD COR BLD-ACNC: 0.02 IU/ML
P AXIS - MUSE: 64 DEGREES
PR INTERVAL - MUSE: 182 MS
QRS DURATION - MUSE: 76 MS
QT - MUSE: 428 MS
QTC - MUSE: 423 MS
QUANTIFERON MITOGEN: 10 IU/ML
QUANTIFERON NIL TUBE: 0.02 IU/ML
QUANTIFERON TB1 TUBE: 0.04 IU/ML
QUANTIFERON TB2 TUBE: 0.03
R AXIS - MUSE: 14 DEGREES
SYSTOLIC BLOOD PRESSURE - MUSE: NORMAL MMHG
T AXIS - MUSE: 17 DEGREES
VENTRICULAR RATE- MUSE: 59 BPM

## 2024-01-22 LAB
WNV IGG SER IA-ACNC: 0.19 IV
WNV IGM SER IA-ACNC: 0 IV

## 2024-01-23 ENCOUNTER — DOCUMENTATION ONLY (OUTPATIENT)
Dept: TRANSPLANT | Facility: CLINIC | Age: 54
End: 2024-01-23
Payer: COMMERCIAL

## 2024-01-23 PROBLEM — D23.9 DYSPLASTIC NEVUS: Status: ACTIVE | Noted: 2024-01-23

## 2024-01-23 PROBLEM — E78.5 DYSLIPIDEMIA: Status: ACTIVE | Noted: 2024-01-23

## 2024-01-23 PROBLEM — M17.9 DJD (DEGENERATIVE JOINT DISEASE) OF KNEE: Status: ACTIVE | Noted: 2024-01-23

## 2024-01-23 LAB
BSA: 1.91 M2
IOHEXOL CL UR+SERPL-VRATE: 100 ML/MIN
IOHEXOL CL UR+SERPL-VRATE: 3.54 MG/DL
IOHEXOL CL UR+SERPL-VRATE: 7.44 MG/DL
IOHEXOL CL UR+SERPL-VRATE: 91 /1.73 M2

## 2024-01-23 NOTE — PROGRESS NOTES
Image Review Meeting    ATTENDEES: Dr. Nye    IMAGES REVIEWED: CTA renal from 1/18/24    IMPRESSION:   1. RIGHT KIDNEY:       A. The right kidney contains a single renal artery, a single  renal vein, and a single ureter.       B. The right kidney parenchyma is normal.        C. The renal volume is 150.2 cc.     2. LEFT KIDNEY:       A. The left kidney contains two renal arteries, a single renal  vein, and a single ureter.       B. The left kidney parenchyma is normal.        C. The renal volume is 171.4 cc.    DECISION: LEFT with right available.  Left small upper pole artery will be unable to reconstruct.     INCIDENTALS: No

## 2024-01-24 ENCOUNTER — TELEPHONE (OUTPATIENT)
Dept: TRANSPLANT | Facility: CLINIC | Age: 54
End: 2024-01-24
Payer: COMMERCIAL

## 2024-01-24 ENCOUNTER — DOCUMENTATION ONLY (OUTPATIENT)
Dept: PHARMACY | Facility: CLINIC | Age: 54
End: 2024-01-24
Payer: COMMERCIAL

## 2024-01-24 ENCOUNTER — COMMITTEE REVIEW (OUTPATIENT)
Dept: TRANSPLANT | Facility: CLINIC | Age: 54
End: 2024-01-24
Payer: COMMERCIAL

## 2024-01-24 LAB
A*: NORMAL
A*LOCUS SEROLOGIC EQUIVALENT: 2
A*LOCUS: NORMAL
A*SEROLOGIC EQUIVALENT: 3
ABTEST METHOD: NORMAL
B*: NORMAL
B*LOCUS SEROLOGIC EQUIVALENT: 7
B*LOCUS: NORMAL
B*SEROLOGIC EQUIVALENT: 18
BW-1: NORMAL
C*: NORMAL
C*LOCUS SEROLOGIC EQUIVALENT: 7
C*LOCUS: NORMAL
C*SEROLOGIC EQUIVALENT: 7
DPA1*: NORMAL
DPB1*: NORMAL
DPB1*LOCUS NMDP: NORMAL
DPB1*LOCUS: NORMAL
DPB1*NMDP: NORMAL
DQA1*: NORMAL
DQA1*LOCUS: NORMAL
DQB1*: NORMAL
DQB1*LOCUS SEROLOGIC EQUIVALENT: 7
DQB1*LOCUS: NORMAL
DQB1*SEROLOGIC EQUIVALENT: 9
DRB1*: NORMAL
DRB1*LOCUS SEROLOGIC EQUIVALENT: 9
DRB1*LOCUS: NORMAL
DRB1*SEROLOGIC EQUIVALENT: 12
DRB3*LOCUS SEROLOGIC EQUIVALENT: 52
DRB3*LOCUS: NORMAL
DRB4*: NORMAL
DRB4*SEROLOGIC EQUIVALENT: 53
DRSSO TEST METHOD: NORMAL

## 2024-01-24 NOTE — PROGRESS NOTES
TRANSPLANT NEPHROLOGY DONOR EVALUATION    Assessment and Plan:  # Prospective Kidney Transplant Donor: Patient with no issues that need to be addressed prior to donation. Patient's blood pressure is acceptable at this visit, kidney function appears to be acceptable with Iohexol pending, and urinalysis is bland.    Discussed the risks of donating a kidney, including the surgical risk and the possible risks of living with one kidney.    Education about expected post-donation kidney function and how chronic kidney disease (CKD) and end stage kidney disease (ESKD) might potentially impact the donor in the future, include, but not limited to:       - On average, donors will have 25-35% permanent loss of kidney function at donation.       - Baseline risk of ESKD may slightly exceed that of members of the general        population with the same demographic profile.       - Donor risks must be interpreted in light of known epidemiology of both CKD or         ESKD, such as that CKD generally develops in midlife (40-50 years old) and ESKD         generally develops after age 60.       - Medical evaluation of young potential donors cannot predict lifetime risk of CKD or         ESKD.       - Donors may be at higher risk for CKD if they sustain damage to the remaining         kidney.       - Development of CKD and progression of ESKD may be more rapid with only 1         kidney.       - Some type of kidney replacement therapy, either kidney transplant or dialysis, is         required when reaching ESKD.    Potential medical or surgical risks include, but not limited to:       - Death.       - Scars, pain, fatigue, and other consequences typical of any surgical procedure.       - Decreased kidney function.       - Abdominal or bowel symptoms, such as bloating and nausea, and developing         bowel obstruction.       - Kidney failure (ESKD) and the need for a kidney transplant or dialysis for the donor.       - Impact of  obesity, hypertension, or other donor-specific medical conditions on         morbidity and mortality of the potential donor.    Patients overall evaluation will be discussed with the transplant team and a final recommendation on the patients' suitability to be a kidney transplant donor will be made at that time.    Consult:  Jazzy Agosto was seen in consultation at the request of Dr. Enoc Lunsford for evaluation as a potential kidney transplant donor.    Reason for Visit:  Jazzy Agosto is a 53 year old female who presents for a kidney donor evaluation.  Patient would like to be a non-directed donor.    Present Condition and Donor-Related Medical History:   Patient reports her brother previously donated a part of his liver about 10 years ago and more recently donated a kidney and is doing well.  She has thought about being a donor and is now interested in doing so.     Energy level is good and has been normal.  She is active and does get some exercise.  Denies any chest pain or shortness of breath with exertion.  Appetite is good and no recent weight change.  No nausea, vomiting or diarrhea.  No fever, sweats or chills.  No leg swelling.  No pain or burning with urination.          Kidney Disease Hx:       h/o Kidney Problems: No   Family h/o Genetic Kidney Disease: No       h/o Hypertension: No      Usual Blood Pressure:  Variable       h/o Protein in Urine: No    h/o Blood in Urine: No       h/o Kidney Stones: No     h/o Kidney Injury: No       h/o Recurrent UTI: No   h/o Genitourinary Problems: No       h/o Chronic NSAID Use: No         Other Medical Hx:       h/o Diabetes: No             h/o Gastrointestinal, Pancreas or Liver Problems: No       h/o Lung or Heart Problems: No       h/o Hematologic Problems: No  h/o Bleeding or Clotting Problems: No       h/o Cancer: No       h/o Infection Problems: No       H/o Gestational DM: No      H/o Gestational HTN: No     H/o Preeclampsia: No         Skin  Cancer Risk: Patient with a h/o dysplastic nevus, but no skin cancer.  Follows regularly with Dermatology, last 11/2023.       h/o more than 50 moles: Yes        h/o extensive sun exposure: No       h/o melanoma: No       Family h/o melanoma: No         Mental Health Assessment:       h/o Depression: Yes: Situational at times, but not lately       h/o Psychiatric Illness: No       h/o Suicidal Attempt(s): No    COVID Status:  Vaccination Up To Date: Yes  H/o COVID Infection: Yes; No residual symptoms     Review Of Systems:   A comprehensive review of systems was obtained and negative, except as noted in the HPI or PMH.    Past Medical History:   History was taken from the patient as noted below.  Past Medical History:   Diagnosis Date    DJD (degenerative joint disease) of knee     Dyslipidemia     Dysplastic nevus     Urinary tract infection        Past Social History:   Past Surgical History:   Procedure Laterality Date    NO HISTORY OF SURGERY       Family history of anesthesia problems: No  No personal history of anesthesia    Family History:   Family History   Problem Relation Age of Onset    Breast Cancer Mother     Diabetes Type 2  Father     Hypertension Father     Coronary Artery Disease Father     Kidney Disease Father           Specific Family History in First Degree Relatives:       FH of Kidney Dz: Yes   FH of Diabetes: Yes        FH of Hypertension: Yes  FH of CAD: Yes        FH of Cancer: Yes   FH of Kidney Cancer: No    Personal History:   Social History     Socioeconomic History    Marital status:      Spouse name: Not on file    Number of children: 2    Years of education: Not on file    Highest education level: Not on file   Occupational History    Not on file   Tobacco Use    Smoking status: Never    Smokeless tobacco: Never   Substance and Sexual Activity    Alcohol use: Never    Drug use: Never    Sexual activity: Not on file   Other Topics Concern    Not on file   Social History  Narrative    Not on file     Social Determinants of Health     Financial Resource Strain: Not on file   Food Insecurity: Not on file   Transportation Needs: Not on file   Physical Activity: Not on file   Stress: Not on file   Social Connections: Not on file   Interpersonal Safety: Not on file   Housing Stability: Not on file          Specific Social History:       Health Insurance Status: Yes       Employment Status: Part time  Occupation: Nurse on a Med/Surg floor                       Living Arrangements: lives with their spouse       Social Support: Yes       Presence of increased risk for disease transmission behaviors as defined by [facility] guidelines: No        Allergies:  Allergies   Allergen Reactions    Sertraline Unknown       Medications:  No current outpatient medications on file.     No current facility-administered medications for this visit.     There are no discontinued medications.      Vitals:      1/18/2024     8:27 AM 1/18/2024     8:28 AM 1/18/2024     8:29 AM   Vital Signs   Systolic 115 126 121   Diastolic 45 84 64       Exam:   GENERAL APPEARANCE: alert and no distress  HENT: mouth without ulcers or lesions  RESP: lungs clear to auscultation - no rales, rhonchi or wheezes  CV: regular rhythm, normal rate, no rub, no murmur  EDEMA: no LE edema bilaterally  ABDOMEN: soft, nondistended, nontender, bowel sounds normal  MS: extremities normal - no gross deformities noted, no evidence of inflammation in joints, no muscle tenderness  SKIN: no rash    Results:   Labs and imaging were ordered for this visit and reviewed by me.  Recent Results (from the past 336 hour(s))   EBV Capsid Antibody IgM    Collection Time: 01/18/24  6:59 AM   Result Value Ref Range    EBV Capsid Shima IgM Instrument Value <10.0 <36.0 U/mL    EBV Capsid Antibody IgM No detectable antibody. No detectable antibody.   EBV Capsid Antibody IgG    Collection Time: 01/18/24  6:59 AM   Result Value Ref Range    EBV Capsid Shima IgG Instrument  Value 200.0 (H) <18.0 U/mL    EBV Capsid Antibody IgG Positive (A) No detectable antibody.   CMV Antibody IgG    Collection Time: 01/18/24  6:59 AM   Result Value Ref Range    CMV Shima IgG Instrument Value <0.20 <0.60 U/mL    CMV Antibody IgG No detectable antibody. No detectable antibody.    HCG quantitative pregnancy    Collection Time: 01/18/24  6:59 AM   Result Value Ref Range    hCG Quantitative <1 <5 mIU/mL   West Nile Virus Antibody IgG IgM    Collection Time: 01/18/24  6:59 AM   Result Value Ref Range    West Nile IgG Serum 0.19 <=1.29 IV    West Nile IgM Serum 0.00 <=0.89 IV   Treponema Abs w Reflex to RPR and Titer    Collection Time: 01/18/24  6:59 AM   Result Value Ref Range    Treponema Antibody Total Nonreactive Nonreactive   HIV Antigen Antibody Combo Pretransplant Cascade    Collection Time: 01/18/24  6:59 AM   Result Value Ref Range    HIV Antigen Antibody Combo Pretransplant Nonreactive Nonreactive   Hepatitis C antibody    Collection Time: 01/18/24  6:59 AM   Result Value Ref Range    Hepatitis C Antibody Nonreactive Nonreactive   Hepatitis B surface antigen    Collection Time: 01/18/24  6:59 AM   Result Value Ref Range    Hepatitis B Surface Antigen Nonreactive Nonreactive   Hepatitis B Surface Antibody    Collection Time: 01/18/24  6:59 AM   Result Value Ref Range    Hepatitis B Surface Antibody Reactive     Hepatitis B Surface Antibody Instrument Value 517.00 <8.5 m[IU]/mL   Hepatitis B core antibody    Collection Time: 01/18/24  6:59 AM   Result Value Ref Range    Hepatitis B Core Antibody Total Nonreactive Nonreactive   Protein  random urine    Collection Time: 01/18/24  6:59 AM   Result Value Ref Range    Total Protein Urine mg/dL <6.0   mg/dL    Total Protein Urine mg/mg Creat      Creatinine Urine mg/dL 42.0 mg/dL   Albumin Random Urine Quantitative with Creat Ratio    Collection Time: 01/18/24  6:59 AM   Result Value Ref Range    Creatinine Urine mg/dL 43.9 mg/dL    Albumin Urine mg/L  <12.0 mg/L    Albumin Urine mg/g Cr     Routine UA with microscopic    Collection Time: 01/18/24  6:59 AM   Result Value Ref Range    Color Urine Straw Colorless, Straw, Light Yellow, Yellow    Appearance Urine Clear Clear    Glucose Urine Negative Negative mg/dL    Bilirubin Urine Negative Negative    Ketones Urine Negative Negative mg/dL    Specific Gravity Urine 1.005 1.003 - 1.035    Blood Urine Negative Negative    pH Urine 6.0 5.0 - 7.0    Protein Albumin Urine Negative Negative mg/dL    Urobilinogen Urine Normal Normal, 2.0 mg/dL    Nitrite Urine Negative Negative    Leukocyte Esterase Urine Negative Negative    Bacteria Urine Few (A) None Seen /HPF    RBC Urine 2 <=2 /HPF    WBC Urine 1 <=5 /HPF    Squamous Epithelials Urine 3 (H) <=1 /HPF   CBC with platelets    Collection Time: 01/18/24  6:59 AM   Result Value Ref Range    WBC Count 4.9 4.0 - 11.0 10e3/uL    RBC Count 5.02 3.80 - 5.20 10e6/uL    Hemoglobin 15.1 11.7 - 15.7 g/dL    Hematocrit 45.2 35.0 - 47.0 %    MCV 90 78 - 100 fL    MCH 30.1 26.5 - 33.0 pg    MCHC 33.4 31.5 - 36.5 g/dL    RDW 12.8 10.0 - 15.0 %    Platelet Count 233 150 - 450 10e3/uL   Partial thromboplastin time    Collection Time: 01/18/24  6:59 AM   Result Value Ref Range    aPTT 27 22 - 38 Seconds   INR    Collection Time: 01/18/24  6:59 AM   Result Value Ref Range    INR 1.13 0.85 - 1.15   Hemoglobin A1c    Collection Time: 01/18/24  6:59 AM   Result Value Ref Range    Hemoglobin A1C 5.5 <5.7 %   Phosphorus    Collection Time: 01/18/24  6:59 AM   Result Value Ref Range    Phosphorus 3.4 2.5 - 4.5 mg/dL   Uric acid    Collection Time: 01/18/24  6:59 AM   Result Value Ref Range    Uric Acid 4.6 2.4 - 5.7 mg/dL   Lipid Profile    Collection Time: 01/18/24  6:59 AM   Result Value Ref Range    Cholesterol 204 (H) <200 mg/dL    Triglycerides 74 <150 mg/dL    Direct Measure HDL 44 (L) >=50 mg/dL    LDL Cholesterol Calculated 145 (H) <=100 mg/dL    Non HDL Cholesterol 160 (H) <130 mg/dL     Patient Fasting > 8hrs? Yes    Comprehensive metabolic panel    Collection Time: 01/18/24  6:59 AM   Result Value Ref Range    Sodium 140 135 - 145 mmol/L    Potassium 3.8 3.4 - 5.3 mmol/L    Carbon Dioxide (CO2) 25 22 - 29 mmol/L    Anion Gap 10 7 - 15 mmol/L    Urea Nitrogen 9.7 6.0 - 20.0 mg/dL    Creatinine 0.77 0.51 - 0.95 mg/dL    GFR Estimate >90 >60 mL/min/1.73m2    Calcium 9.2 8.6 - 10.0 mg/dL    Chloride 105 98 - 107 mmol/L    Glucose 91 70 - 99 mg/dL    Alkaline Phosphatase 80 40 - 150 U/L    AST 19 0 - 45 U/L    ALT 11 0 - 50 U/L    Protein Total 7.5 6.4 - 8.3 g/dL    Albumin 4.5 3.5 - 5.2 g/dL    Bilirubin Total 0.4 <=1.2 mg/dL   Quantiferon TB Gold Plus Grey Tube    Collection Time: 01/18/24  6:59 AM    Specimen: Arm, Right; Blood   Result Value Ref Range    Quantiferon Nil Tube 0.02 IU/mL   Quantiferon TB Gold Plus Green Tube    Collection Time: 01/18/24  6:59 AM    Specimen: Arm, Right; Blood   Result Value Ref Range    Quantiferon TB1 Tube 0.04 IU/mL   Quantiferon TB Gold Plus Yellow Tube    Collection Time: 01/18/24  6:59 AM    Specimen: Arm, Right; Blood   Result Value Ref Range    Quantiferon TB2 Tube 0.03    Quantiferon TB Gold Plus Purple Tube    Collection Time: 01/18/24  6:59 AM    Specimen: Arm, Right; Blood   Result Value Ref Range    Quantiferon Mitogen 10.00 IU/mL   Adult Type and Screen    Collection Time: 01/18/24  6:59 AM   Result Value Ref Range    ABO/RH(D) AB POS     Antibody Screen Negative Negative    SPECIMEN EXPIRATION DATE [id number]    Quantiferon TB Gold Plus    Collection Time: 01/18/24  6:59 AM    Specimen: Arm, Right; Blood   Result Value Ref Range    Quantiferon-TB Gold Plus Negative Negative    TB1 Ag minus Nil Value 0.02 IU/mL    TB2 Ag minus Nil Value 0.01 IU/mL    Mitogen minus Nil Result 9.98 IU/mL    Nil Result 0.02 IU/mL   ABO and Rh 2nd type and screen required    Collection Time: 01/18/24  7:34 AM   Result Value Ref Range    ABO/RH(D) AB POS     SPECIMEN  EXPIRATION DATE [id number]    Iohexol 1st Order    Collection Time: 01/18/24  9:32 AM   Result Value Ref Range    Iohexol Time 1 7.44 mg/dL    Iohexol Time 2 3.54 mg/dL    Iohexol Body Surface Area 1.914 m2    Iohexol Raw Clearance 100 mL/min    Iohexol Std Clearance 91 /1.73 m2   EKG 12-lead complete w/read - Clinics    Collection Time: 01/18/24 12:39 PM   Result Value Ref Range    Systolic Blood Pressure  mmHg    Diastolic Blood Pressure  mmHg    Ventricular Rate 59 BPM    Atrial Rate 59 BPM    LA Interval 182 ms    QRS Duration 76 ms     ms    QTc 423 ms    P Axis 64 degrees    R AXIS 14 degrees    T Axis 17 degrees    Interpretation ECG       Sinus bradycardia  Otherwise normal ECG  No previous ECGs available  Confirmed by MD GEO, CHERYL ([id number]) on 1/19/2024 3:00:53 PM

## 2024-01-24 NOTE — PHARMACY-MEDICATION REGIMEN REVIEW
Pharmacy Living Kidney Donor Pre-Surgery Medication Evaluation     This patient is a 53 year old female being considered for living kidney donation.  As part of the donor pre-surgery patient evaluation, pharmacy has screened this patient's electronic medical record for medication related concerns.      Assessment / Plan    The following medication related issues may be of possible concern for this patient post surgery, based on the medical record medication list review.     Hold topical products on day of surgery.    Pharmacy will continue to participate in this patient's care throughout the surgery course. Please contact pharmacy with any further medication related questions or concerns.     Thank You,  Araceli Clark, Gurmeet  [facility]  [phone]

## 2024-01-24 NOTE — COMMITTEE REVIEW
Living Donor Committee Review Note Evaluation Date: 1/18/2024  Committee Review Date: 1/24/2024    Donor being evaluated for: Kidney    Transplant Phase: Evaluation  Transplant Status: Active    Transplant Coordinator: India Moreno  Transplant Surgeon:       Committee Review Members:  Independent Living Donor Advocate Krissy Garcia, [facility]   Nephrology Luann Potter MD, Jennifer Hammond MD, Adalberto Weston MD   Pharmacist Araceli Clark, [facility]    - Clinical FATOU Hendricks, Ese Ramirez   Transplant Toya Meera Yo, RN, Alia Pink, RN, Aleida Hoyos LPN, Karin Mckenna, RN, Araceli Schwartz, RN, Kim Kerr, RN   Transplant Surgery Jeffrey Brito MD, Marbella Nye MD, MD       Transplant Eligibility: Acceptable Physical Health, Acceptable Mental Health    Committee Review Decision: Approved    Relative Contraindications: None    Absolute Contraindications: None    Committee Chair Marbella Nye MD verbally attested to the committee's decision.    Committee Discussion Details:   No medical history that needs to be addressed

## 2024-01-24 NOTE — TELEPHONE ENCOUNTER
Spoke to Jazzy regarding committee review and let her know she is approved to donate.     She wants to donate after May 3rd because her daughter is graduating.

## 2024-01-25 DIAGNOSIS — Z00.5 TRANSPLANT DONOR EVALUATION: Primary | ICD-10-CM

## 2024-03-26 ENCOUNTER — DOCUMENTATION ONLY (OUTPATIENT)
Dept: TRANSPLANT | Facility: CLINIC | Age: 54
End: 2024-03-26
Payer: COMMERCIAL

## 2024-03-26 ENCOUNTER — TELEPHONE (OUTPATIENT)
Dept: TRANSPLANT | Facility: CLINIC | Age: 54
End: 2024-03-26
Payer: COMMERCIAL

## 2024-03-26 NOTE — TELEPHONE ENCOUNTER
"Jazzy is wanting to [proceed as a non directed donor in the paired exchange program. We discussed in detail about the the paired exchange program and the option to list family/friends as voucher holders. We discussed that if one voucher is redeemed the rest are invalid. Jazzy takes comfort in knowing her kidney will be a gift to someone else whether  or not her voucher holders can use her voucher.       Today we reviewed education and consent for KPD and SRTR Data.     First we reviewed \"KPD Donor Informed Consent\" Revision date 12/1/2015, 8/5/2018, 8/1/2023 in its entirety.                               KPD Consent, NKR QING, and SRTR was sent via [employer].  I gave instructions on how to return them to me for practitioner signature. Jazzy stated her preferred donation dates as 05/06/24- 05/26/24. Discussed the order of next steps- sign consents, upload to NKR, then activate in paired exchange program by April 15th.     Jazzy verbalized understanding of discussion and is in agreement with plan. Jazzy knows how to get a hold of me in the meantime if she has any additional questions/concerns.         "

## 2024-03-26 NOTE — LETTER
Ms. Purcell ALO Agosto   [id number] [facility] [address]   March 01, 2024     Dear Jazzy   I am writing on behalf of the Transplant Program at the [facility]. I would like to take this opportunity to thank you for recently undergoing an evaluation as a possible kidney donor.   Your evaluation is complete and your test results have been reviewed and discussed by the donor team at our weekly meeting. I am pleased to tell you that the team has determined that you meet our criteria for living kidney donation and you have been approved to donate. I would be happy to review any specific details of your evaluation testing with you and to discuss the next steps in this process. Thank you.   Sincerely,   India Moreno, Living Donor Coordinator   Living Donor Program  [facility], [facility]  Tele: [phone] or [phone], ext. [phone]

## 2024-03-27 ENCOUNTER — TELEPHONE (OUTPATIENT)
Dept: TRANSPLANT | Facility: CLINIC | Age: 54
End: 2024-03-27
Payer: COMMERCIAL

## 2024-03-27 NOTE — TELEPHONE ENCOUNTER
STEFANY called and left message to Jazzy as she is approved for donation and has NKR set up for registation. STEFANY offered support with navigating NKR/Donor Shield should she need it.     STEFANY will follow up with Jazzy as appropriate.       FATOU Pearce, [facility]  Living Donor   Phone: [phone]  Pager: [phone]  [email or web address]

## 2024-04-16 ENCOUNTER — DOCUMENTATION ONLY (OUTPATIENT)
Dept: TRANSPLANT | Facility: CLINIC | Age: 54
End: 2024-04-16
Payer: COMMERCIAL

## 2024-04-16 ENCOUNTER — TELEPHONE (OUTPATIENT)
Dept: TRANSPLANT | Facility: CLINIC | Age: 54
End: 2024-04-16
Payer: COMMERCIAL

## 2024-04-16 NOTE — TELEPHONE ENCOUNTER
Writer calling Jazzy to let her know we will be activating her in NKR today. Jazzy is in agreement with plan and wants to be active today and stick with original date range. Let Jazzy know I would be calling once we have a match offer and surgery date. Jazzy verbalized understanding.

## 2024-04-24 ENCOUNTER — TELEPHONE (OUTPATIENT)
Dept: TRANSPLANT | Facility: CLINIC | Age: 54
End: 2024-04-24
Payer: COMMERCIAL

## 2024-04-24 DIAGNOSIS — Z52.4 KIDNEY DONOR: Primary | ICD-10-CM

## 2024-04-24 DIAGNOSIS — Z52.4 ENCOUNTER FOR DONATION OF KIDNEY: ICD-10-CM

## 2024-04-24 NOTE — TELEPHONE ENCOUNTER
Writer calling Jazzy to let her know we have a match offer and surgery date. We discussed surgery dates, preop dates, and post op dates a well. Let Jazzy know I would be in touch with further details in the next few days. Diane verbalized understanding and is in agreement with plan.

## 2024-05-01 ENCOUNTER — DOCUMENTATION ONLY (OUTPATIENT)
Dept: TRANSPLANT | Facility: CLINIC | Age: 54
End: 2024-05-01
Payer: COMMERCIAL

## 2024-05-01 DIAGNOSIS — Z52.4 ENCOUNTER FOR DONATION OF KIDNEY: Primary | ICD-10-CM

## 2024-05-05 LAB
ABO/RH(D): NORMAL
ANTIBODY SCREEN: NEGATIVE
SPECIMEN EXPIRATION DATE: NORMAL

## 2024-05-06 ENCOUNTER — OFFICE VISIT (OUTPATIENT)
Dept: TRANSPLANT | Facility: CLINIC | Age: 54
End: 2024-05-06
Attending: SURGERY

## 2024-05-06 ENCOUNTER — ANCILLARY PROCEDURE (OUTPATIENT)
Dept: GENERAL RADIOLOGY | Facility: CLINIC | Age: 54
End: 2024-05-06
Attending: SURGERY

## 2024-05-06 ENCOUNTER — ALLIED HEALTH/NURSE VISIT (OUTPATIENT)
Dept: TRANSPLANT | Facility: CLINIC | Age: 54
End: 2024-05-06
Attending: SURGERY

## 2024-05-06 ENCOUNTER — LAB (OUTPATIENT)
Dept: LAB | Facility: CLINIC | Age: 54
End: 2024-05-06
Attending: SURGERY

## 2024-05-06 VITALS
RESPIRATION RATE: 16 BRPM | DIASTOLIC BLOOD PRESSURE: 95 MMHG | HEIGHT: 65 IN | SYSTOLIC BLOOD PRESSURE: 147 MMHG | WEIGHT: 169 LBS | HEART RATE: 69 BPM | BODY MASS INDEX: 28.16 KG/M2 | OXYGEN SATURATION: 99 %

## 2024-05-06 DIAGNOSIS — Z52.4 ENCOUNTER FOR DONATION OF KIDNEY: ICD-10-CM

## 2024-05-06 LAB
ALBUMIN UR-MCNC: NEGATIVE MG/DL
APPEARANCE UR: CLEAR
BILIRUB UR QL STRIP: NEGATIVE
COLOR UR AUTO: ABNORMAL
CREAT SERPL-MCNC: 0.83 MG/DL (ref 0.51–0.95)
EGFRCR SERPLBLD CKD-EPI 2021: 84 ML/MIN/1.73M2
GLUCOSE UR STRIP-MCNC: NEGATIVE MG/DL
HBV CORE AB SERPL QL IA: NONREACTIVE
HBV SURFACE AG SERPL QL IA: NONREACTIVE
HCG INTACT+B SERPL-ACNC: 1 MIU/ML
HCV AB SERPL QL IA: NONREACTIVE
HGB BLD-MCNC: 15.5 G/DL (ref 11.7–15.7)
HGB UR QL STRIP: NEGATIVE
HIV 1+2 AB+HIV1 P24 AG SERPL QL IA: NONREACTIVE
KETONES UR STRIP-MCNC: NEGATIVE MG/DL
LEUKOCYTE ESTERASE UR QL STRIP: ABNORMAL
NITRATE UR QL: NEGATIVE
PH UR STRIP: 6.5 [PH] (ref 5–7)
RBC URINE: 1 /HPF
SP GR UR STRIP: 1 (ref 1–1.03)
SQUAMOUS EPITHELIAL: <1 /HPF
UROBILINOGEN UR STRIP-MCNC: NORMAL MG/DL
WBC URINE: <1 /HPF

## 2024-05-06 PROCEDURE — 86665 EPSTEIN-BARR CAPSID VCA: CPT | Performed by: SURGERY

## 2024-05-06 PROCEDURE — 86704 HEP B CORE ANTIBODY TOTAL: CPT | Performed by: SURGERY

## 2024-05-06 PROCEDURE — 99207 PR NO BILLABLE SERVICE THIS VISIT: CPT | Performed by: NURSE PRACTITIONER

## 2024-05-06 PROCEDURE — 99214 OFFICE O/P EST MOD 30 MIN: CPT | Mod: GC | Performed by: SURGERY

## 2024-05-06 PROCEDURE — 99000 SPECIMEN HANDLING OFFICE-LAB: CPT | Performed by: PATHOLOGY

## 2024-05-06 PROCEDURE — 87340 HEPATITIS B SURFACE AG IA: CPT | Performed by: SURGERY

## 2024-05-06 PROCEDURE — 82565 ASSAY OF CREATININE: CPT | Performed by: PATHOLOGY

## 2024-05-06 PROCEDURE — 87516 HEPATITIS B DNA AMP PROBE: CPT | Performed by: SURGERY

## 2024-05-06 PROCEDURE — 81001 URINALYSIS AUTO W/SCOPE: CPT | Performed by: PATHOLOGY

## 2024-05-06 PROCEDURE — 86900 BLOOD TYPING SEROLOGIC ABO: CPT | Performed by: SURGERY

## 2024-05-06 PROCEDURE — 86803 HEPATITIS C AB TEST: CPT | Performed by: SURGERY

## 2024-05-06 PROCEDURE — 84702 CHORIONIC GONADOTROPIN TEST: CPT | Performed by: PATHOLOGY

## 2024-05-06 PROCEDURE — 36415 COLL VENOUS BLD VENIPUNCTURE: CPT | Performed by: PATHOLOGY

## 2024-05-06 PROCEDURE — 86644 CMV ANTIBODY: CPT | Performed by: SURGERY

## 2024-05-06 PROCEDURE — 85018 HEMOGLOBIN: CPT | Performed by: PATHOLOGY

## 2024-05-06 PROCEDURE — 99214 OFFICE O/P EST MOD 30 MIN: CPT | Performed by: NURSE PRACTITIONER

## 2024-05-06 PROCEDURE — 71046 X-RAY EXAM CHEST 2 VIEWS: CPT | Mod: GC | Performed by: RADIOLOGY

## 2024-05-06 ASSESSMENT — PAIN SCALES - GENERAL: PAINLEVEL: NO PAIN (0)

## 2024-05-06 NOTE — LETTER
5/6/2024         RE: Jazzy Agosto  [id number] [facility] [address]        Dear Colleague,    Thank you for referring your patient, Jazzy Agosto, to the [facility]. Please see a copy of my visit note below.    Transplant Surgery H&P                                                        HPI:                                                      Ms. Agosto is a 54 year old female who comes to clinic today for preop prior to planned laparoscopic kidney donation surgery. The patient was previously reviewed by the living donor multidisciplinary selection committee and found to be medically and psychosocially appropriate for voluntary kidney donation. The patient denies any feelings of being pressured to proceed with kidney donation.  Health events since donor evaluation: None    Special considerations:   Constipation: no  PONV: no  History of Urinary retention? no  Significant neck/back/joint concerns for lateral decubitus positioning?: No  Quaker: No    MEDICAL HISTORY:                                                      Patient Active Problem List    Diagnosis Date Noted     Dyslipidemia 01/23/2024     Priority: Medium     Dysplastic nevus 01/23/2024     Priority: Medium     DJD (degenerative joint disease) of knee 01/23/2024     Priority: Medium     Transplant donor evaluation 11/21/2023     Priority: Medium      Past Medical History:   Diagnosis Date     DJD (degenerative joint disease) of knee      Dyslipidemia      Dysplastic nevus      Urinary tract infection      Past Surgical History:   Procedure Laterality Date     NO HISTORY OF SURGERY       No current outpatient medications on file.     OTC products: None, except as noted above  Allergies   Allergen Reactions     Sertraline Unknown      Social History     Tobacco Use     Smoking status: Never     Smokeless tobacco: Never   Substance Use Topics     Alcohol use: Never     History   Drug Use Unknown  "      REVIEW OF SYSTEMS:                                                    CONSTITUTIONAL: NEGATIVE for fever, chills, change in weight  INTEGUMENTARY/SKIN: NEGATIVE for worrisome rashes, moles or lesions  EYES: NEGATIVE for vision changes or irritation  ENT/MOUTH: NEGATIVE for ear, mouth and throat problems  RESP: NEGATIVE for significant cough or SOB  CV: NEGATIVE for chest pain, palpitations or peripheral edema  GI: NEGATIVE for nausea, abdominal pain, heartburn, or change in bowel habits  : NEGATIVE for frequency, dysuria, or hematuria  MUSCULOSKELETAL: NEGATIVE for significant arthralgias or myalgia  NEURO: NEGATIVE for weakness, dizziness or paresthesias  ENDOCRINE: NEGATIVE for temperature intolerance, skin/hair changes  HEME: NEGATIVE for bleeding problems  PSYCHIATRIC: NEGATIVE for changes in mood or affect    EXAM:                                                    BP (!) 147/95 (BP Location: Left arm, Patient Position: Sitting, Cuff Size: Adult Regular)   Pulse 69   Resp 16   Ht 1.64 m (5' 4.57\")   Wt 76.7 kg (169 lb)   SpO2 99%   BMI 28.50 kg/m    GENERAL: alert and no distress  EYES: Eyes grossly normal to inspection, PERRL and conjunctivae and sclerae normal  HENT: ear canals and TM's normal, nose and mouth without ulcers or lesions  NECK: no adenopathy, no asymmetry, masses, or scars  RESP: lungs clear to auscultation - no rales, rhonchi or wheezes  CV: regular rate and rhythm, normal S1 S2, no S3 or S4, no murmur, click or rub, no peripheral edema  ABDOMEN: soft, nontender, no hepatosplenomegaly, no masses and bowel sounds normal  MS: no gross musculoskeletal defects noted, no edema  SKIN: no suspicious lesions or rashes  NEURO: Normal strength and tone, mentation intact and speech normal  PSYCH: mentation appears normal, affect normal/bright    DIAGNOSTICS:                                                    Chest Xray: EXAM: XR CHEST 2 VIEWS  5/6/2024 11:39 AM      HISTORY:  Encounter for " donation of kidney        COMPARISON:  1/18/2024     FINDINGS: Two views of the chest.     Trachea is midline. Cardiomediastinal silhouette is within normal  limits. No focal airspace mass or consolidation. No visualized  pneumothorax or pleural effusion. Bones are grossly normal.                                                        IMPRESSION: Normal.     Labs Resulted Today:   Results for orders placed or performed in visit on 05/06/24   HCG quantitative pregnancy     Status: Normal   Result Value Ref Range    hCG Quantitative 1 <5 mIU/mL   UA with Microscopic reflex to Culture     Status: Abnormal    Specimen: Urine, Midstream   Result Value Ref Range    Color Urine Straw Colorless, Straw, Light Yellow, Yellow    Appearance Urine Clear Clear    Glucose Urine Negative Negative mg/dL    Bilirubin Urine Negative Negative    Ketones Urine Negative Negative mg/dL    Specific Gravity Urine 1.005 1.003 - 1.035    Blood Urine Negative Negative    pH Urine 6.5 5.0 - 7.0    Protein Albumin Urine Negative Negative mg/dL    Urobilinogen Urine Normal Normal, 2.0 mg/dL    Nitrite Urine Negative Negative    Leukocyte Esterase Urine Trace (A) Negative    RBC Urine 1 <=2 /HPF    WBC Urine <1 <=5 /HPF    Squamous Epithelials Urine <1 <=1 /HPF    Narrative    Urine Culture not indicated   Hemoglobin     Status: Normal   Result Value Ref Range    Hemoglobin 15.5 11.7 - 15.7 g/dL   Creatinine     Status: Normal   Result Value Ref Range    Creatinine 0.83 0.51 - 0.95 mg/dL    GFR Estimate 84 >60 mL/min/1.73m2   ABO/Rh type and screen     Status: None (In process)    Narrative    The following orders were created for panel order ABO/Rh type and screen.  Procedure                               Abnormality         Status                     ---------                               -----------         ------                     Adult Type and Screen[[id number]]                            In process                   Please view results  for these tests on the individual orders.     Recent Labs   Lab Test 05/06/24  1208 01/18/24  0659   HGB 15.5 15.1   PLT  --  233   INR  --  1.13   NA  --  140   POTASSIUM  --  3.8   CR 0.83 0.77   A1C  --  5.5      Assessment:                                                    Healthy voluntary kidney donor    There have been no significant intercurrent medical problems or change of intent in proceeding with kidney donation as scheduled on 5/14/24.    1. Labs reviewed and within normal limits: Yes  2. EKG (1/18/24): Sinus bradycardia   Otherwise normal ECG   No previous ECGs available   3. Paired Exchange case: No  4. ABO= AB POS  5. Laterality: right   6. Outstanding issues: None    Plan:                                                      1. Consent: Not Done  2. Outstanding issues: None    Signed Electronically by: KENNETH Sherman CNP         Again, thank you for allowing me to participate in the care of your patient.        Sincerely,        KENNETH Sherman CNP

## 2024-05-06 NOTE — PROGRESS NOTES
Preop visit for donor next week.  No contraindications to donation.   She is ready.     Hand-assist laparoscopic right donor nephrectomy.   One artery, one vein, one ureter.     LUCERO Kaye MD    I have reviewed history, examined patient and discussed plan with the fellow/resident/TIMOTEO.  I concur with the findings in this note.    Patient seen in pre-op visit for laparoscopic donor nephrectomy. Risks and complications of the procedure including surgical and medical risks were discussed including but not limited to the rare complication of mortality. Patient understood the procedure with it's attendant risks and and provided informed consent.         Total time: 30 min  Counseling time: 20 min

## 2024-05-06 NOTE — LETTER
5/6/2024         RE: Jazzy Agosto  [id number] [facility] [address]        Dear Colleague,    Thank you for referring your patient, Jazzy Agosto, to the [facility]. Please see a copy of my visit note below.    Preop visit for donor next week.  No contraindications to donation.   She is ready.     Hand-assist laparoscopic right donor nephrectomy.   One artery, one vein, one ureter.     LUCERO Kaye MD    I have reviewed history, examined patient and discussed plan with the fellow/resident/TIMOTEO.  I concur with the findings in this note.    Patient seen in pre-op visit for laparoscopic donor nephrectomy. Risks and complications of the procedure including surgical and medical risks were discussed including but not limited to the rare complication of mortality. Patient understood the procedure with it's attendant risks and and provided informed consent.         Total time: 30 min  Counseling time: 20 min                 Again, thank you for allowing me to participate in the care of your patient.        Sincerely,        Kelly Barker MD

## 2024-05-06 NOTE — NURSING NOTE
"Chief Complaint   Patient presents with    Pre-Op Exam     Kidney donor      Vital signs:      BP: (!) 147/95 Pulse: 69   Resp: 16 SpO2: 99 %     Height: 164 cm (5' 4.57\") Weight: 76.7 kg (169 lb)  Estimated body mass index is 28.5 kg/m  as calculated from the following:    Height as of this encounter: 1.64 m (5' 4.57\").    Weight as of this encounter: 76.7 kg (169 lb).      Meeta Soares, [facility]  5/6/2024 12:49 PM    "

## 2024-05-06 NOTE — PROGRESS NOTES
Transplant Surgery H&P                                                        HPI:                                                      Ms. Agosto is a 54 year old female who comes to clinic today for preop prior to planned laparoscopic kidney donation surgery. The patient was previously reviewed by the living donor multidisciplinary selection committee and found to be medically and psychosocially appropriate for voluntary kidney donation. The patient denies any feelings of being pressured to proceed with kidney donation.  Health events since donor evaluation: None    Special considerations:   Constipation: no  PONV: no  History of Urinary retention? no  Significant neck/back/joint concerns for lateral decubitus positioning?: No  Denominational: No    MEDICAL HISTORY:                                                      Patient Active Problem List    Diagnosis Date Noted    Dyslipidemia 01/23/2024     Priority: Medium    Dysplastic nevus 01/23/2024     Priority: Medium    DJD (degenerative joint disease) of knee 01/23/2024     Priority: Medium    Transplant donor evaluation 11/21/2023     Priority: Medium      Past Medical History:   Diagnosis Date    DJD (degenerative joint disease) of knee     Dyslipidemia     Dysplastic nevus     Urinary tract infection      Past Surgical History:   Procedure Laterality Date    NO HISTORY OF SURGERY       No current outpatient medications on file.     OTC products: None, except as noted above  Allergies   Allergen Reactions    Sertraline Unknown      Social History     Tobacco Use    Smoking status: Never    Smokeless tobacco: Never   Substance Use Topics    Alcohol use: Never     History   Drug Use Unknown       REVIEW OF SYSTEMS:                                                    CONSTITUTIONAL: NEGATIVE for fever, chills, change in weight  INTEGUMENTARY/SKIN: NEGATIVE for worrisome rashes, moles or lesions  EYES: NEGATIVE for vision changes or irritation  ENT/MOUTH: NEGATIVE  "for ear, mouth and throat problems  RESP: NEGATIVE for significant cough or SOB  CV: NEGATIVE for chest pain, palpitations or peripheral edema  GI: NEGATIVE for nausea, abdominal pain, heartburn, or change in bowel habits  : NEGATIVE for frequency, dysuria, or hematuria  MUSCULOSKELETAL: NEGATIVE for significant arthralgias or myalgia  NEURO: NEGATIVE for weakness, dizziness or paresthesias  ENDOCRINE: NEGATIVE for temperature intolerance, skin/hair changes  HEME: NEGATIVE for bleeding problems  PSYCHIATRIC: NEGATIVE for changes in mood or affect    EXAM:                                                    BP (!) 147/95 (BP Location: Left arm, Patient Position: Sitting, Cuff Size: Adult Regular)   Pulse 69   Resp 16   Ht 1.64 m (5' 4.57\")   Wt 76.7 kg (169 lb)   SpO2 99%   BMI 28.50 kg/m    GENERAL: alert and no distress  EYES: Eyes grossly normal to inspection, PERRL and conjunctivae and sclerae normal  HENT: ear canals and TM's normal, nose and mouth without ulcers or lesions  NECK: no adenopathy, no asymmetry, masses, or scars  RESP: lungs clear to auscultation - no rales, rhonchi or wheezes  CV: regular rate and rhythm, normal S1 S2, no S3 or S4, no murmur, click or rub, no peripheral edema  ABDOMEN: soft, nontender, no hepatosplenomegaly, no masses and bowel sounds normal  MS: no gross musculoskeletal defects noted, no edema  SKIN: no suspicious lesions or rashes  NEURO: Normal strength and tone, mentation intact and speech normal  PSYCH: mentation appears normal, affect normal/bright    DIAGNOSTICS:                                                    Chest Xray: EXAM: XR CHEST 2 VIEWS  5/6/2024 11:39 AM      HISTORY:  Encounter for donation of kidney        COMPARISON:  1/18/2024     FINDINGS: Two views of the chest.     Trachea is midline. Cardiomediastinal silhouette is within normal  limits. No focal airspace mass or consolidation. No visualized  pneumothorax or pleural effusion. Bones are grossly " normal.                                                        IMPRESSION: Normal.     Labs Resulted Today:   Results for orders placed or performed in visit on 05/06/24   HCG quantitative pregnancy     Status: Normal   Result Value Ref Range    hCG Quantitative 1 <5 mIU/mL   UA with Microscopic reflex to Culture     Status: Abnormal    Specimen: Urine, Midstream   Result Value Ref Range    Color Urine Straw Colorless, Straw, Light Yellow, Yellow    Appearance Urine Clear Clear    Glucose Urine Negative Negative mg/dL    Bilirubin Urine Negative Negative    Ketones Urine Negative Negative mg/dL    Specific Gravity Urine 1.005 1.003 - 1.035    Blood Urine Negative Negative    pH Urine 6.5 5.0 - 7.0    Protein Albumin Urine Negative Negative mg/dL    Urobilinogen Urine Normal Normal, 2.0 mg/dL    Nitrite Urine Negative Negative    Leukocyte Esterase Urine Trace (A) Negative    RBC Urine 1 <=2 /HPF    WBC Urine <1 <=5 /HPF    Squamous Epithelials Urine <1 <=1 /HPF    Narrative    Urine Culture not indicated   Hemoglobin     Status: Normal   Result Value Ref Range    Hemoglobin 15.5 11.7 - 15.7 g/dL   Creatinine     Status: Normal   Result Value Ref Range    Creatinine 0.83 0.51 - 0.95 mg/dL    GFR Estimate 84 >60 mL/min/1.73m2   ABO/Rh type and screen     Status: None (In process)    Narrative    The following orders were created for panel order ABO/Rh type and screen.  Procedure                               Abnormality         Status                     ---------                               -----------         ------                     Adult Type and Screen[[id number]]                            In process                   Please view results for these tests on the individual orders.     Recent Labs   Lab Test 05/06/24  1208 01/18/24  0659   HGB 15.5 15.1   PLT  --  233   INR  --  1.13   NA  --  140   POTASSIUM  --  3.8   CR 0.83 0.77   A1C  --  5.5      Assessment:                                                     Healthy voluntary kidney donor    There have been no significant intercurrent medical problems or change of intent in proceeding with kidney donation as scheduled on 5/14/24.    1. Labs reviewed and within normal limits: Yes  2. EKG (1/18/24): Sinus bradycardia   Otherwise normal ECG   No previous ECGs available   3. Paired Exchange case: No  4. ABO= AB POS  5. Laterality: right   6. Outstanding issues: None    Plan:                                                      1. Consent: Not Done  2. Outstanding issues: None    Signed Electronically by: KENNETH Sherman CNP

## 2024-05-08 LAB
CMV IGG SERPL IA-ACNC: <0.2 U/ML
CMV IGG SERPL IA-ACNC: NORMAL
EBV VCA IGG SER IA-ACNC: 197 U/ML
EBV VCA IGG SER IA-ACNC: POSITIVE
EBV VCA IGM SER IA-ACNC: <10 U/ML
EBV VCA IGM SER IA-ACNC: NORMAL

## 2024-05-08 NOTE — NURSING NOTE
Saw donor in clinic for her Pre-Op visit.  I reviewed procedure for the surgery day.  She will be donating to an anonymous recipient.    Jazzy was alone today for preop.     I reviewed pain control medication that is typically prescribed including nerve block which Anesthesia will place preop.  I reviewed the ERAS protocol teaching sheet:  Encourage Activity.  Demonstrated and reviewed importance of Incentive Spirometry usage- I gave the patient an IS to bring home to practice and to bring to surgery.   I gave the patient two bottles of Ensure clear with instructions to drink at bedtime the night prior to surgery and again the morning of surgery when the patient awakens.  I reviewed instruction for no solid foods after 10 pm the night before surgery.  I reviewed NPO status including clear liquids begins at 05:30 on the morning of surgery.  I reviewed arrival time of 05:30 to 3C and Start time of surgery 07:30.    Pt will meet with Surgeon and Nurse Practitioner today to develop plan for surgery.  I gave the patient a donor blanket and mug.   I thanked the patient for all that she is doing to make this happen.  I reviewed that the donor's bills will be paid for by insurance of the matched recipient.    I reviewed that the patient has a right to withdraw from kidney donation at any time, for any reason.     I reviewed post op plan of care including timing for office visit with surgeon and when labs are needed.  The patient will go home after discharge and then return to [facility] for her 2 week visit.   We reviewed plan for blood draws post donation.     I reviewed both the donor and recipient ABO blood types, UNOS ID and placed source documents in folder for the day of surgery ABO verification by the surgical team.        Final XM results pending.  Serologies pending.       Living Donor Risk Criteria reviewed with Patient    Person who has had sex (including vaginal, anal, and oral) with a person known or suspected to  have HIV, HBV, or HCV infections in the preceding 30 days. No    Men who have had sex with men in the preceding 30 days  No    Person who has had sex in exchange for money or drugs in the preceding 30 days No    Person who has had sex with a person who has sex in exchange for money or drugs in the preceding 30 days No    Person who has injected drugs for non-medical reasons in the preceding 30 days No    Person who has had sex with a person that has injected drugs for non-medical reasons in the preceding 30 days No    Person who has been incarcerated (confined in retirement, FCI, or juvenile correctional facility) for greater than/equal to 72 consecutive hours in the preceding 30 days No    Child who has been  within the preceding 30 days by a mother with HIV infection N/A    Child born in the preceding 30 days to a mother known to be infected with HIV, HBV, or HCV N/A    Person with an unknown medical or social history for the preceding 30 days (a Donor Risk Assessment Interview - DRAI - cannot be obtained or risk factors cannot be determined) No       .

## 2024-05-09 LAB
HBV DNA SERPL QL NAA+PROBE: NORMAL
HCV RNA SERPL QL NAA+PROBE: NORMAL
HIV1+2 RNA SERPL QL NAA+PROBE: NORMAL
WNV RNA SERPL DONR QL NAA+PROBE: NORMAL

## 2024-05-13 ENCOUNTER — ANESTHESIA EVENT (OUTPATIENT)
Dept: SURGERY | Facility: CLINIC | Age: 54
End: 2024-05-13

## 2024-05-13 ENCOUNTER — TELEPHONE (OUTPATIENT)
Dept: TRANSPLANT | Facility: CLINIC | Age: 54
End: 2024-05-13
Payer: COMMERCIAL

## 2024-05-13 NOTE — TELEPHONE ENCOUNTER
Writer calling Jazzy to discuss pre op information, check in details, and to go over any last minute questions. Jazzy verbalized understanding of discussion and is in agreement with plan. Jazzy will check in for donor surgery tomorrow at 6:00am.

## 2024-05-13 NOTE — ANESTHESIA PREPROCEDURE EVALUATION
Anesthesia Pre-Procedure Evaluation    Patient: Jazzy Agosto   MRN: [id number] : 1970        Procedure : Procedure(s):  Laparoscopic Hand Assisted (Anonymous) Living Non-Directed Kidney Donor          Past Medical History:   Diagnosis Date    DJD (degenerative joint disease) of knee     Dyslipidemia     Dysplastic nevus     Urinary tract infection       Past Surgical History:   Procedure Laterality Date    NO HISTORY OF SURGERY        Allergies   Allergen Reactions    Sertraline Unknown      Social History     Tobacco Use    Smoking status: Never    Smokeless tobacco: Never   Substance Use Topics    Alcohol use: Never      Wt Readings from Last 1 Encounters:   24 76.7 kg (169 lb)        Anesthesia Evaluation            ROS/MED HX  ENT/Pulmonary:  - neg pulmonary ROS     Neurologic:  - neg neurologic ROS     Cardiovascular:     (+) Dyslipidemia - -   -  - -                                 Previous cardiac testing   Echo: Date: Results:    Stress Test:  Date: Results:    ECG Reviewed:  Date: 24 Results:  Sinus bradycardia, otherwise normal.  Cath:  Date: Results:   (-) hypertension, CAD and CHF   METS/Exercise Tolerance:     Hematologic:       Musculoskeletal: Comment: Degenerative joint disease       GI/Hepatic:  - neg GI/hepatic ROS     Renal/Genitourinary:  - neg Renal ROS     Endo:  - neg endo ROS     Psychiatric/Substance Use:  - neg psychiatric ROS     Infectious Disease:  - neg infectious disease ROS     Malignancy:  - neg malignancy ROS     Other:            Physical Exam    Airway        Mallampati: I   TM distance: > 3 FB   Neck ROM: full   Mouth opening: > 3 cm    Respiratory Devices and Support         Dental       (+) Completely normal teeth      Cardiovascular          Rhythm and rate: regular and normal     Pulmonary           breath sounds clear to auscultation           OUTSIDE LABS:  CBC:   Lab Results   Component Value Date    WBC 4.9 2024    HGB 15.5 2024    HGB  "15.1 01/18/2024    HCT 45.2 01/18/2024     01/18/2024     BMP:   Lab Results   Component Value Date     01/18/2024    POTASSIUM 3.8 01/18/2024    CHLORIDE 105 01/18/2024    CO2 25 01/18/2024    BUN 9.7 01/18/2024    CR 0.83 05/06/2024    CR 0.77 01/18/2024    GLC 91 01/18/2024     COAGS:   Lab Results   Component Value Date    PTT 27 01/18/2024    INR 1.13 01/18/2024     POC: No results found for: \"BGM\", \"HCG\", \"HCGS\"  HEPATIC:   Lab Results   Component Value Date    ALBUMIN 4.5 01/18/2024    PROTTOTAL 7.5 01/18/2024    ALT 11 01/18/2024    AST 19 01/18/2024    ALKPHOS 80 01/18/2024    BILITOTAL 0.4 01/18/2024     OTHER:   Lab Results   Component Value Date    A1C 5.5 01/18/2024    STU 9.2 01/18/2024    PHOS 3.4 01/18/2024       Anesthesia Plan    ASA Status:  2    NPO Status:  NPO Appropriate    Anesthesia Type: General.     - Airway: ETT   Induction: Intravenous.   Maintenance: Balanced.   Techniques and Equipment:     - Lines/Monitors: BIS, 2nd IV (Clear sight)     - Blood: T&S     Consents    Anesthesia Plan(s) and associated risks, benefits, and realistic alternatives discussed. Questions answered and patient/representative(s) expressed understanding.     - Discussed: Risks, Benefits and Alternatives for the PROCEDURE were discussed     - Discussed with:  Patient      - Extended Intubation/Ventilatory Support Discussed: No.      - Patient is DNR/DNI Status: No     Use of blood products discussed: No .     Postoperative Care    Pain management: IV analgesics, Oral pain medications, Multi-modal analgesia.   PONV prophylaxis: Ondansetron (or other 5HT-3), Dexamethasone or Solumedrol     Comments:               Guillermo Alfaro MD    I have reviewed the pertinent notes and labs in the chart from the past 30 days and (re)examined the patient.  Any updates or changes from those notes are reflected in this note.              # Overweight: Estimated body mass index is 28.5 kg/m  as calculated from the " "following:    Height as of 5/6/24: 1.64 m (5' 4.57\").    Weight as of 5/6/24: 76.7 kg (169 lb).      "

## 2024-05-14 ENCOUNTER — HOSPITAL ENCOUNTER (INPATIENT)
Facility: CLINIC | Age: 54
LOS: 1 days | Discharge: HOME | DRG: 661 | End: 2024-05-15
Attending: SURGERY | Admitting: SURGERY

## 2024-05-14 ENCOUNTER — ANESTHESIA (OUTPATIENT)
Dept: SURGERY | Facility: CLINIC | Age: 54
End: 2024-05-14

## 2024-05-14 DIAGNOSIS — Z52.4 ENCOUNTER FOR DONATION OF KIDNEY: ICD-10-CM

## 2024-05-14 DIAGNOSIS — Z00.5 TRANSPLANT DONOR EVALUATION: Primary | ICD-10-CM

## 2024-05-14 LAB
CK SERPL-CCNC: 105 U/L (ref 26–192)
ERYTHROCYTE [DISTWIDTH] IN BLOOD BY AUTOMATED COUNT: 12.8 % (ref 10–15)
GLUCOSE BLDC GLUCOMTR-MCNC: 90 MG/DL (ref 70–99)
HCT VFR BLD AUTO: 39.6 % (ref 35–47)
HGB BLD-MCNC: 13.2 G/DL (ref 11.7–15.7)
HOLD SPECIMEN: NORMAL
HOLD SPECIMEN: NORMAL
MCH RBC QN AUTO: 30.6 PG (ref 26.5–33)
MCHC RBC AUTO-ENTMCNC: 33.3 G/DL (ref 31.5–36.5)
MCV RBC AUTO: 92 FL (ref 78–100)
PLATELET # BLD AUTO: 178 10E3/UL (ref 150–450)
RBC # BLD AUTO: 4.31 10E6/UL (ref 3.8–5.2)
WBC # BLD AUTO: 12.3 10E3/UL (ref 4–11)

## 2024-05-14 PROCEDURE — 250N000011 HC RX IP 250 OP 636: Performed by: STUDENT IN AN ORGANIZED HEALTH CARE EDUCATION/TRAINING PROGRAM

## 2024-05-14 PROCEDURE — 250N000009 HC RX 250

## 2024-05-14 PROCEDURE — 250N000011 HC RX IP 250 OP 636: Performed by: NURSE PRACTITIONER

## 2024-05-14 PROCEDURE — 999N000141 HC STATISTIC PRE-PROCEDURE NURSING ASSESSMENT: Performed by: SURGERY

## 2024-05-14 PROCEDURE — 272N000001 HC OR GENERAL SUPPLY STERILE: Performed by: SURGERY

## 2024-05-14 PROCEDURE — 999N000128 HC STATISTIC PERIPHERAL IV START W/O US GUIDANCE

## 2024-05-14 PROCEDURE — 360N000077 HC SURGERY LEVEL 4, PER MIN: Performed by: SURGERY

## 2024-05-14 PROCEDURE — 88240 CELL CRYOPRESERVE/STORAGE: CPT | Performed by: NURSE PRACTITIONER

## 2024-05-14 PROCEDURE — 250N000025 HC SEVOFLURANE, PER MIN: Performed by: SURGERY

## 2024-05-14 PROCEDURE — 258N000003 HC RX IP 258 OP 636

## 2024-05-14 PROCEDURE — 120N000011 HC R&B TRANSPLANT UMMC

## 2024-05-14 PROCEDURE — 250N000011 HC RX IP 250 OP 636

## 2024-05-14 PROCEDURE — 250N000011 HC RX IP 250 OP 636: Performed by: ANESTHESIOLOGY

## 2024-05-14 PROCEDURE — 250N000013 HC RX MED GY IP 250 OP 250 PS 637: Performed by: NURSE PRACTITIONER

## 2024-05-14 PROCEDURE — 370N000017 HC ANESTHESIA TECHNICAL FEE, PER MIN: Performed by: SURGERY

## 2024-05-14 PROCEDURE — 85027 COMPLETE CBC AUTOMATED: CPT | Performed by: NURSE PRACTITIONER

## 2024-05-14 PROCEDURE — 250N000009 HC RX 250: Performed by: SURGERY

## 2024-05-14 PROCEDURE — 250N000013 HC RX MED GY IP 250 OP 250 PS 637: Performed by: STUDENT IN AN ORGANIZED HEALTH CARE EDUCATION/TRAINING PROGRAM

## 2024-05-14 PROCEDURE — 82550 ASSAY OF CK (CPK): CPT | Performed by: NURSE PRACTITIONER

## 2024-05-14 PROCEDURE — 0TT00ZZ RESECTION OF RIGHT KIDNEY, OPEN APPROACH: ICD-10-PCS | Performed by: STUDENT IN AN ORGANIZED HEALTH CARE EDUCATION/TRAINING PROGRAM

## 2024-05-14 PROCEDURE — C9290 INJ, BUPIVACAINE LIPOSOME: HCPCS | Performed by: ANESTHESIOLOGY

## 2024-05-14 PROCEDURE — 36415 COLL VENOUS BLD VENIPUNCTURE: CPT | Performed by: NURSE PRACTITIONER

## 2024-05-14 PROCEDURE — 50547 LAPARO REMOVAL DONOR KIDNEY: CPT | Performed by: ANESTHESIOLOGY

## 2024-05-14 PROCEDURE — 710N000010 HC RECOVERY PHASE 1, LEVEL 2, PER MIN: Performed by: SURGERY

## 2024-05-14 PROCEDURE — 258N000003 HC RX IP 258 OP 636: Performed by: ANESTHESIOLOGY

## 2024-05-14 RX ORDER — HYDROMORPHONE HCL IN WATER/PF 6 MG/30 ML
0.2 PATIENT CONTROLLED ANALGESIA SYRINGE INTRAVENOUS EVERY 5 MIN PRN
Status: DISCONTINUED | OUTPATIENT
Start: 2024-05-14 | End: 2024-05-14 | Stop reason: HOSPADM

## 2024-05-14 RX ORDER — FENTANYL CITRATE 50 UG/ML
INJECTION, SOLUTION INTRAMUSCULAR; INTRAVENOUS PRN
Status: DISCONTINUED | OUTPATIENT
Start: 2024-05-14 | End: 2024-05-14

## 2024-05-14 RX ORDER — SODIUM CHLORIDE, SODIUM LACTATE, POTASSIUM CHLORIDE, CALCIUM CHLORIDE 600; 310; 30; 20 MG/100ML; MG/100ML; MG/100ML; MG/100ML
1-3 INJECTION, SOLUTION INTRAVENOUS CONTINUOUS
Status: DISCONTINUED | OUTPATIENT
Start: 2024-05-14 | End: 2024-05-14 | Stop reason: HOSPADM

## 2024-05-14 RX ORDER — LIDOCAINE HYDROCHLORIDE 20 MG/ML
INJECTION, SOLUTION INFILTRATION; PERINEURAL PRN
Status: DISCONTINUED | OUTPATIENT
Start: 2024-05-14 | End: 2024-05-14

## 2024-05-14 RX ORDER — ONDANSETRON 4 MG/1
4 TABLET, ORALLY DISINTEGRATING ORAL EVERY 6 HOURS PRN
Status: DISCONTINUED | OUTPATIENT
Start: 2024-05-14 | End: 2024-05-15 | Stop reason: HOSPADM

## 2024-05-14 RX ORDER — SODIUM CHLORIDE, SODIUM LACTATE, POTASSIUM CHLORIDE, CALCIUM CHLORIDE 600; 310; 30; 20 MG/100ML; MG/100ML; MG/100ML; MG/100ML
INJECTION, SOLUTION INTRAVENOUS CONTINUOUS
Status: DISCONTINUED | OUTPATIENT
Start: 2024-05-14 | End: 2024-05-14 | Stop reason: HOSPADM

## 2024-05-14 RX ORDER — MANNITOL 20 G/100ML
INJECTION, SOLUTION INTRAVENOUS PRN
Status: DISCONTINUED | OUTPATIENT
Start: 2024-05-14 | End: 2024-05-14

## 2024-05-14 RX ORDER — NALOXONE HYDROCHLORIDE 0.4 MG/ML
0.2 INJECTION, SOLUTION INTRAMUSCULAR; INTRAVENOUS; SUBCUTANEOUS
Status: DISCONTINUED | OUTPATIENT
Start: 2024-05-14 | End: 2024-05-15 | Stop reason: HOSPADM

## 2024-05-14 RX ORDER — HYDROMORPHONE HCL IN WATER/PF 6 MG/30 ML
0.2 PATIENT CONTROLLED ANALGESIA SYRINGE INTRAVENOUS EVERY 4 HOURS PRN
Status: DISCONTINUED | OUTPATIENT
Start: 2024-05-14 | End: 2024-05-15 | Stop reason: HOSPADM

## 2024-05-14 RX ORDER — HYDROMORPHONE HCL IN WATER/PF 6 MG/30 ML
0.4 PATIENT CONTROLLED ANALGESIA SYRINGE INTRAVENOUS EVERY 5 MIN PRN
Status: DISCONTINUED | OUTPATIENT
Start: 2024-05-14 | End: 2024-05-14 | Stop reason: HOSPADM

## 2024-05-14 RX ORDER — KETOROLAC TROMETHAMINE 30 MG/ML
15 INJECTION, SOLUTION INTRAMUSCULAR; INTRAVENOUS ONCE
Status: DISCONTINUED | OUTPATIENT
Start: 2024-05-14 | End: 2024-05-14 | Stop reason: HOSPADM

## 2024-05-14 RX ORDER — HEPARIN SODIUM 1000 [USP'U]/ML
5000 INJECTION, SOLUTION INTRAVENOUS; SUBCUTANEOUS ONCE
Status: DISCONTINUED | OUTPATIENT
Start: 2024-05-14 | End: 2024-05-14 | Stop reason: HOSPADM

## 2024-05-14 RX ORDER — NALOXONE HYDROCHLORIDE 0.4 MG/ML
0.4 INJECTION, SOLUTION INTRAMUSCULAR; INTRAVENOUS; SUBCUTANEOUS
Status: DISCONTINUED | OUTPATIENT
Start: 2024-05-14 | End: 2024-05-15 | Stop reason: HOSPADM

## 2024-05-14 RX ORDER — MAGNESIUM SULFATE HEPTAHYDRATE 40 MG/ML
2 INJECTION, SOLUTION INTRAVENOUS ONCE
Status: DISCONTINUED | OUTPATIENT
Start: 2024-05-14 | End: 2024-05-14 | Stop reason: HOSPADM

## 2024-05-14 RX ORDER — EPHEDRINE SULFATE 50 MG/ML
INJECTION, SOLUTION INTRAMUSCULAR; INTRAVENOUS; SUBCUTANEOUS PRN
Status: DISCONTINUED | OUTPATIENT
Start: 2024-05-14 | End: 2024-05-14

## 2024-05-14 RX ORDER — ACETAMINOPHEN 325 MG/1
975 TABLET ORAL ONCE
Status: DISCONTINUED | OUTPATIENT
Start: 2024-05-14 | End: 2024-05-14 | Stop reason: HOSPADM

## 2024-05-14 RX ORDER — FENTANYL CITRATE 50 UG/ML
10-20 INJECTION, SOLUTION INTRAMUSCULAR; INTRAVENOUS
Status: DISCONTINUED | OUTPATIENT
Start: 2024-05-14 | End: 2024-05-14

## 2024-05-14 RX ORDER — GABAPENTIN 300 MG/1
300 CAPSULE ORAL ONCE
Status: COMPLETED | OUTPATIENT
Start: 2024-05-14 | End: 2024-05-14

## 2024-05-14 RX ORDER — HEPARIN SODIUM 1000 [USP'U]/ML
INJECTION, SOLUTION INTRAVENOUS; SUBCUTANEOUS PRN
Status: DISCONTINUED | OUTPATIENT
Start: 2024-05-14 | End: 2024-05-14

## 2024-05-14 RX ORDER — DEXAMETHASONE SODIUM PHOSPHATE 4 MG/ML
INJECTION, SOLUTION INTRA-ARTICULAR; INTRALESIONAL; INTRAMUSCULAR; INTRAVENOUS; SOFT TISSUE PRN
Status: DISCONTINUED | OUTPATIENT
Start: 2024-05-14 | End: 2024-05-14

## 2024-05-14 RX ORDER — FUROSEMIDE 10 MG/ML
INJECTION INTRAMUSCULAR; INTRAVENOUS PRN
Status: DISCONTINUED | OUTPATIENT
Start: 2024-05-14 | End: 2024-05-14

## 2024-05-14 RX ORDER — ONDANSETRON 2 MG/ML
4 INJECTION INTRAMUSCULAR; INTRAVENOUS ONCE
Status: CANCELLED | OUTPATIENT
Start: 2024-05-14 | End: 2024-05-14

## 2024-05-14 RX ORDER — FLUMAZENIL 0.1 MG/ML
0.2 INJECTION, SOLUTION INTRAVENOUS
Status: DISCONTINUED | OUTPATIENT
Start: 2024-05-14 | End: 2024-05-14 | Stop reason: HOSPADM

## 2024-05-14 RX ORDER — PROCHLORPERAZINE MALEATE 10 MG
10 TABLET ORAL EVERY 6 HOURS PRN
Status: DISCONTINUED | OUTPATIENT
Start: 2024-05-14 | End: 2024-05-15 | Stop reason: HOSPADM

## 2024-05-14 RX ORDER — LIDOCAINE 40 MG/G
CREAM TOPICAL
Status: DISCONTINUED | OUTPATIENT
Start: 2024-05-14 | End: 2024-05-14 | Stop reason: HOSPADM

## 2024-05-14 RX ORDER — ACETAMINOPHEN 325 MG/1
975 TABLET ORAL ONCE
Status: COMPLETED | OUTPATIENT
Start: 2024-05-14 | End: 2024-05-14

## 2024-05-14 RX ORDER — ONDANSETRON 4 MG/1
4 TABLET, ORALLY DISINTEGRATING ORAL EVERY 6 HOURS PRN
Status: DISCONTINUED | OUTPATIENT
Start: 2024-05-14 | End: 2024-05-14 | Stop reason: HOSPADM

## 2024-05-14 RX ORDER — CEFUROXIME SODIUM 1.5 G/16ML
1.5 INJECTION, POWDER, FOR SOLUTION INTRAVENOUS EVERY 4 HOURS PRN
Status: DISCONTINUED | OUTPATIENT
Start: 2024-05-14 | End: 2024-05-14 | Stop reason: HOSPADM

## 2024-05-14 RX ORDER — BISACODYL 10 MG
10 SUPPOSITORY, RECTAL RECTAL DAILY
Status: DISCONTINUED | OUTPATIENT
Start: 2024-05-15 | End: 2024-05-15 | Stop reason: HOSPADM

## 2024-05-14 RX ORDER — ONDANSETRON 2 MG/ML
4 INJECTION INTRAMUSCULAR; INTRAVENOUS EVERY 6 HOURS PRN
Status: DISCONTINUED | OUTPATIENT
Start: 2024-05-14 | End: 2024-05-14 | Stop reason: HOSPADM

## 2024-05-14 RX ORDER — ONDANSETRON 2 MG/ML
4 INJECTION INTRAMUSCULAR; INTRAVENOUS ONCE
Status: DISCONTINUED | OUTPATIENT
Start: 2024-05-14 | End: 2024-05-14 | Stop reason: HOSPADM

## 2024-05-14 RX ORDER — NALOXONE HYDROCHLORIDE 0.4 MG/ML
0.4 INJECTION, SOLUTION INTRAMUSCULAR; INTRAVENOUS; SUBCUTANEOUS
Status: DISCONTINUED | OUTPATIENT
Start: 2024-05-14 | End: 2024-05-14 | Stop reason: HOSPADM

## 2024-05-14 RX ORDER — FENTANYL CITRATE 50 UG/ML
25-50 INJECTION, SOLUTION INTRAMUSCULAR; INTRAVENOUS
Status: DISCONTINUED | OUTPATIENT
Start: 2024-05-14 | End: 2024-05-14 | Stop reason: HOSPADM

## 2024-05-14 RX ORDER — ONDANSETRON 2 MG/ML
4 INJECTION INTRAMUSCULAR; INTRAVENOUS EVERY 30 MIN PRN
Status: DISCONTINUED | OUTPATIENT
Start: 2024-05-14 | End: 2024-05-14 | Stop reason: HOSPADM

## 2024-05-14 RX ORDER — PROCHLORPERAZINE MALEATE 5 MG
10 TABLET ORAL EVERY 6 HOURS PRN
Status: DISCONTINUED | OUTPATIENT
Start: 2024-05-14 | End: 2024-05-14 | Stop reason: HOSPADM

## 2024-05-14 RX ORDER — PROTAMINE SULFATE 10 MG/ML
50 INJECTION, SOLUTION INTRAVENOUS ONCE
Status: COMPLETED | OUTPATIENT
Start: 2024-05-14 | End: 2024-05-14

## 2024-05-14 RX ORDER — AMOXICILLIN 250 MG
1 CAPSULE ORAL 2 TIMES DAILY
Status: DISCONTINUED | OUTPATIENT
Start: 2024-05-14 | End: 2024-05-15 | Stop reason: HOSPADM

## 2024-05-14 RX ORDER — CARDIOPLEG/ORGAN PRESERV NO.1 9-198-2-1
BOTTLE PERFUSION PRN
Status: DISCONTINUED | OUTPATIENT
Start: 2024-05-14 | End: 2024-05-14 | Stop reason: HOSPADM

## 2024-05-14 RX ORDER — KETOROLAC TROMETHAMINE 30 MG/ML
10 INJECTION, SOLUTION INTRAMUSCULAR; INTRAVENOUS EVERY 8 HOURS
Status: DISCONTINUED | OUTPATIENT
Start: 2024-05-14 | End: 2024-05-15 | Stop reason: HOSPADM

## 2024-05-14 RX ORDER — FENTANYL CITRATE 50 UG/ML
25 INJECTION, SOLUTION INTRAMUSCULAR; INTRAVENOUS EVERY 5 MIN PRN
Status: DISCONTINUED | OUTPATIENT
Start: 2024-05-14 | End: 2024-05-14 | Stop reason: HOSPADM

## 2024-05-14 RX ORDER — FAMOTIDINE 20 MG/1
20 TABLET, FILM COATED ORAL DAILY
Status: DISCONTINUED | OUTPATIENT
Start: 2024-05-14 | End: 2024-05-15 | Stop reason: HOSPADM

## 2024-05-14 RX ORDER — DEXTROSE, SODIUM CHLORIDE, SODIUM LACTATE, POTASSIUM CHLORIDE, AND CALCIUM CHLORIDE 5; .6; .31; .03; .02 G/100ML; G/100ML; G/100ML; G/100ML; G/100ML
INJECTION, SOLUTION INTRAVENOUS CONTINUOUS
Status: DISCONTINUED | OUTPATIENT
Start: 2024-05-14 | End: 2024-05-15 | Stop reason: HOSPADM

## 2024-05-14 RX ORDER — FENTANYL CITRATE 50 UG/ML
50 INJECTION, SOLUTION INTRAMUSCULAR; INTRAVENOUS EVERY 5 MIN PRN
Status: DISCONTINUED | OUTPATIENT
Start: 2024-05-14 | End: 2024-05-14 | Stop reason: HOSPADM

## 2024-05-14 RX ORDER — SODIUM CHLORIDE, SODIUM LACTATE, POTASSIUM CHLORIDE, CALCIUM CHLORIDE 600; 310; 30; 20 MG/100ML; MG/100ML; MG/100ML; MG/100ML
INJECTION, SOLUTION INTRAVENOUS CONTINUOUS PRN
Status: DISCONTINUED | OUTPATIENT
Start: 2024-05-14 | End: 2024-05-14

## 2024-05-14 RX ORDER — ESMOLOL HYDROCHLORIDE 10 MG/ML
INJECTION INTRAVENOUS PRN
Status: DISCONTINUED | OUTPATIENT
Start: 2024-05-14 | End: 2024-05-14

## 2024-05-14 RX ORDER — GLYCOPYRROLATE 0.2 MG/ML
INJECTION, SOLUTION INTRAMUSCULAR; INTRAVENOUS PRN
Status: DISCONTINUED | OUTPATIENT
Start: 2024-05-14 | End: 2024-05-14

## 2024-05-14 RX ORDER — PROPOFOL 10 MG/ML
INJECTION, EMULSION INTRAVENOUS PRN
Status: DISCONTINUED | OUTPATIENT
Start: 2024-05-14 | End: 2024-05-14

## 2024-05-14 RX ORDER — NALOXONE HYDROCHLORIDE 0.4 MG/ML
0.1 INJECTION, SOLUTION INTRAMUSCULAR; INTRAVENOUS; SUBCUTANEOUS
Status: DISCONTINUED | OUTPATIENT
Start: 2024-05-14 | End: 2024-05-14 | Stop reason: HOSPADM

## 2024-05-14 RX ORDER — NALOXONE HYDROCHLORIDE 0.4 MG/ML
0.2 INJECTION, SOLUTION INTRAMUSCULAR; INTRAVENOUS; SUBCUTANEOUS
Status: DISCONTINUED | OUTPATIENT
Start: 2024-05-14 | End: 2024-05-14 | Stop reason: HOSPADM

## 2024-05-14 RX ORDER — DEXAMETHASONE SODIUM PHOSPHATE 4 MG/ML
8 INJECTION, SOLUTION INTRA-ARTICULAR; INTRALESIONAL; INTRAMUSCULAR; INTRAVENOUS; SOFT TISSUE ONCE
Status: DISCONTINUED | OUTPATIENT
Start: 2024-05-14 | End: 2024-05-14 | Stop reason: HOSPADM

## 2024-05-14 RX ORDER — CEFUROXIME SODIUM 1.5 G/16ML
1.5 INJECTION, POWDER, FOR SOLUTION INTRAVENOUS
Status: COMPLETED | OUTPATIENT
Start: 2024-05-14 | End: 2024-05-14

## 2024-05-14 RX ORDER — ONDANSETRON 2 MG/ML
4 INJECTION INTRAMUSCULAR; INTRAVENOUS EVERY 6 HOURS PRN
Status: DISCONTINUED | OUTPATIENT
Start: 2024-05-14 | End: 2024-05-15 | Stop reason: HOSPADM

## 2024-05-14 RX ORDER — ONDANSETRON 2 MG/ML
INJECTION INTRAMUSCULAR; INTRAVENOUS PRN
Status: DISCONTINUED | OUTPATIENT
Start: 2024-05-14 | End: 2024-05-14

## 2024-05-14 RX ORDER — DEXAMETHASONE SODIUM PHOSPHATE 4 MG/ML
4 INJECTION, SOLUTION INTRA-ARTICULAR; INTRALESIONAL; INTRAMUSCULAR; INTRAVENOUS; SOFT TISSUE
Status: DISCONTINUED | OUTPATIENT
Start: 2024-05-14 | End: 2024-05-14 | Stop reason: HOSPADM

## 2024-05-14 RX ORDER — AMOXICILLIN 250 MG
2 CAPSULE ORAL 2 TIMES DAILY
Status: DISCONTINUED | OUTPATIENT
Start: 2024-05-14 | End: 2024-05-15 | Stop reason: HOSPADM

## 2024-05-14 RX ORDER — ONDANSETRON 4 MG/1
4 TABLET, ORALLY DISINTEGRATING ORAL EVERY 30 MIN PRN
Status: DISCONTINUED | OUTPATIENT
Start: 2024-05-14 | End: 2024-05-14 | Stop reason: HOSPADM

## 2024-05-14 RX ADMIN — Medication 30 MG: at 09:34

## 2024-05-14 RX ADMIN — PROTAMINE SULFATE 10 MG: 10 INJECTION, SOLUTION INTRAVENOUS at 11:09

## 2024-05-14 RX ADMIN — MIDAZOLAM 2 MG: 1 INJECTION INTRAMUSCULAR; INTRAVENOUS at 07:45

## 2024-05-14 RX ADMIN — Medication 50 MG: at 08:40

## 2024-05-14 RX ADMIN — KETOROLAC TROMETHAMINE 9.9 MG: 30 INJECTION INTRAMUSCULAR; INTRAVENOUS at 21:54

## 2024-05-14 RX ADMIN — FENTANYL CITRATE 50 MCG: 50 INJECTION, SOLUTION INTRAMUSCULAR; INTRAVENOUS at 07:45

## 2024-05-14 RX ADMIN — SODIUM CHLORIDE, POTASSIUM CHLORIDE, SODIUM LACTATE AND CALCIUM CHLORIDE: 600; 310; 30; 20 INJECTION, SOLUTION INTRAVENOUS at 08:37

## 2024-05-14 RX ADMIN — PROCHLORPERAZINE EDISYLATE 10 MG: 5 INJECTION INTRAMUSCULAR; INTRAVENOUS at 19:52

## 2024-05-14 RX ADMIN — SODIUM CHLORIDE, SODIUM LACTATE, POTASSIUM CHLORIDE, CALCIUM CHLORIDE: 600; 310; 30; 20 INJECTION, SOLUTION INTRAVENOUS at 09:04

## 2024-05-14 RX ADMIN — SODIUM CHLORIDE, POTASSIUM CHLORIDE, SODIUM LACTATE AND CALCIUM CHLORIDE: 600; 310; 30; 20 INJECTION, SOLUTION INTRAVENOUS at 09:51

## 2024-05-14 RX ADMIN — SODIUM CHLORIDE, POTASSIUM CHLORIDE, SODIUM LACTATE AND CALCIUM CHLORIDE: 600; 310; 30; 20 INJECTION, SOLUTION INTRAVENOUS at 08:29

## 2024-05-14 RX ADMIN — CEFUROXIME 1.5 G: 1.5 INJECTION, POWDER, FOR SOLUTION INTRAVENOUS at 09:09

## 2024-05-14 RX ADMIN — ESMOLOL HYDROCHLORIDE 100 MG: 10 INJECTION, SOLUTION INTRAVENOUS at 08:40

## 2024-05-14 RX ADMIN — BUPIVACAINE 20 ML: 13.3 INJECTION, SUSPENSION, LIPOSOMAL INFILTRATION at 08:00

## 2024-05-14 RX ADMIN — FENTANYL CITRATE 50 MCG: 50 INJECTION INTRAMUSCULAR; INTRAVENOUS at 09:41

## 2024-05-14 RX ADMIN — FENTANYL CITRATE 50 MCG: 50 INJECTION INTRAMUSCULAR; INTRAVENOUS at 09:26

## 2024-05-14 RX ADMIN — HYDROMORPHONE HYDROCHLORIDE 0.5 MG: 1 INJECTION, SOLUTION INTRAMUSCULAR; INTRAVENOUS; SUBCUTANEOUS at 09:49

## 2024-05-14 RX ADMIN — SODIUM CHLORIDE, POTASSIUM CHLORIDE, SODIUM LACTATE AND CALCIUM CHLORIDE: 600; 310; 30; 20 INJECTION, SOLUTION INTRAVENOUS at 10:42

## 2024-05-14 RX ADMIN — ONDANSETRON 4 MG: 2 INJECTION INTRAMUSCULAR; INTRAVENOUS at 12:01

## 2024-05-14 RX ADMIN — SODIUM CHLORIDE, SODIUM LACTATE, POTASSIUM CHLORIDE, CALCIUM CHLORIDE AND DEXTROSE MONOHYDRATE 60 ML/HR: 5; 600; 310; 30; 20 INJECTION, SOLUTION INTRAVENOUS at 12:32

## 2024-05-14 RX ADMIN — BUPIVACAINE HYDROCHLORIDE 20 ML: 2.5 INJECTION, SOLUTION EPIDURAL; INFILTRATION; INTRACAUDAL; PERINEURAL at 08:00

## 2024-05-14 RX ADMIN — HYDROMORPHONE HYDROCHLORIDE 0.5 MG: 1 INJECTION, SOLUTION INTRAMUSCULAR; INTRAVENOUS; SUBCUTANEOUS at 11:16

## 2024-05-14 RX ADMIN — DEXAMETHASONE SODIUM PHOSPHATE 4 MG: 4 INJECTION, SOLUTION INTRA-ARTICULAR; INTRALESIONAL; INTRAMUSCULAR; INTRAVENOUS; SOFT TISSUE at 09:06

## 2024-05-14 RX ADMIN — FAMOTIDINE 20 MG: 20 TABLET ORAL at 18:08

## 2024-05-14 RX ADMIN — EPHEDRINE SULFATE 10 MG: 5 INJECTION INTRAVENOUS at 09:39

## 2024-05-14 RX ADMIN — HYDROMORPHONE HYDROCHLORIDE 0.2 MG: 0.2 INJECTION, SOLUTION INTRAMUSCULAR; INTRAVENOUS; SUBCUTANEOUS at 13:15

## 2024-05-14 RX ADMIN — ONDANSETRON 4 MG: 2 INJECTION INTRAMUSCULAR; INTRAVENOUS at 12:50

## 2024-05-14 RX ADMIN — PROPOFOL 200 MG: 10 INJECTION, EMULSION INTRAVENOUS at 08:40

## 2024-05-14 RX ADMIN — GABAPENTIN 300 MG: 300 CAPSULE ORAL at 06:35

## 2024-05-14 RX ADMIN — FENTANYL CITRATE 50 MCG: 50 INJECTION INTRAMUSCULAR; INTRAVENOUS at 10:05

## 2024-05-14 RX ADMIN — ONDANSETRON 4 MG: 2 INJECTION INTRAMUSCULAR; INTRAVENOUS at 16:30

## 2024-05-14 RX ADMIN — DOCUSATE SODIUM 50 MG AND SENNOSIDES 8.6 MG 1 TABLET: 8.6; 5 TABLET, FILM COATED ORAL at 20:32

## 2024-05-14 RX ADMIN — SUGAMMADEX 50 MG: 100 INJECTION, SOLUTION INTRAVENOUS at 11:41

## 2024-05-14 RX ADMIN — KETOROLAC TROMETHAMINE 9.9 MG: 30 INJECTION INTRAMUSCULAR; INTRAVENOUS at 13:56

## 2024-05-14 RX ADMIN — FENTANYL CITRATE 50 MCG: 50 INJECTION, SOLUTION INTRAMUSCULAR; INTRAVENOUS at 12:48

## 2024-05-14 RX ADMIN — FOSAPREPITANT 150 MG: 150 INJECTION, POWDER, LYOPHILIZED, FOR SOLUTION INTRAVENOUS at 11:56

## 2024-05-14 RX ADMIN — FENTANYL CITRATE 25 MCG: 50 INJECTION, SOLUTION INTRAMUSCULAR; INTRAVENOUS at 13:07

## 2024-05-14 RX ADMIN — LIDOCAINE HYDROCHLORIDE 60 MG: 20 INJECTION, SOLUTION INFILTRATION; PERINEURAL at 08:40

## 2024-05-14 RX ADMIN — MANNITOL 12.5 G: 20 INJECTION, SOLUTION INTRAVENOUS at 10:38

## 2024-05-14 RX ADMIN — ACETAMINOPHEN 975 MG: 325 TABLET, FILM COATED ORAL at 06:35

## 2024-05-14 RX ADMIN — FENTANYL CITRATE 25 MCG: 50 INJECTION, SOLUTION INTRAMUSCULAR; INTRAVENOUS at 12:39

## 2024-05-14 RX ADMIN — HYDROMORPHONE HYDROCHLORIDE 0.2 MG: 0.2 INJECTION, SOLUTION INTRAMUSCULAR; INTRAVENOUS; SUBCUTANEOUS at 13:47

## 2024-05-14 RX ADMIN — HEPARIN SODIUM 3500 UNITS: 1000 INJECTION INTRAVENOUS; SUBCUTANEOUS at 10:45

## 2024-05-14 RX ADMIN — GLYCOPYRROLATE 0.2 MG: 0.2 INJECTION, SOLUTION INTRAMUSCULAR; INTRAVENOUS at 09:33

## 2024-05-14 RX ADMIN — FUROSEMIDE 5 MG: 10 INJECTION, SOLUTION INTRAVENOUS at 10:38

## 2024-05-14 RX ADMIN — HYDROMORPHONE HYDROCHLORIDE 0.2 MG: 0.2 INJECTION, SOLUTION INTRAMUSCULAR; INTRAVENOUS; SUBCUTANEOUS at 13:26

## 2024-05-14 ASSESSMENT — ACTIVITIES OF DAILY LIVING (ADL)
ADLS_ACUITY_SCORE: 18
ADLS_ACUITY_SCORE: 19
ADLS_ACUITY_SCORE: 19
ADLS_ACUITY_SCORE: 22
ADLS_ACUITY_SCORE: 18
ADLS_ACUITY_SCORE: 19
ADLS_ACUITY_SCORE: 18
ADLS_ACUITY_SCORE: 22
ADLS_ACUITY_SCORE: 19
ADLS_ACUITY_SCORE: 18
ADLS_ACUITY_SCORE: 19
ADLS_ACUITY_SCORE: 19
ADLS_ACUITY_SCORE: 22
ADLS_ACUITY_SCORE: 22
ADLS_ACUITY_SCORE: 18
ADLS_ACUITY_SCORE: 18
ADLS_ACUITY_SCORE: 33
ADLS_ACUITY_SCORE: 22

## 2024-05-14 NOTE — ANESTHESIA CARE TRANSFER NOTE
Patient: Jazzy Agosto    Procedure: Procedure(s):  Laparoscopic Hand Assisted (Anonymous) Living Non-Directed Kidney Donor       Diagnosis: Encounter for donation of kidney [Z52.4]  Diagnosis Additional Information: No value filed.    Anesthesia Type:   General     Note:    Oropharynx: oropharynx clear of all foreign objects and spontaneously breathing  Level of Consciousness: awake and drowsy  Oxygen Supplementation: face mask  Level of Supplemental Oxygen (L/min / FiO2): 6  Independent Airway: airway patency satisfactory and stable  Dentition: dentition unchanged  Vital Signs Stable: post-procedure vital signs reviewed and stable  Report to RN Given: handoff report given  Patient transferred to: PACU    Handoff Report: Identifed the Patient, Identified the Reponsible Provider, Reviewed the pertinent medical history, Discussed the surgical course, Reviewed Intra-OP anesthesia mangement and issues during anesthesia, Set expectations for post-procedure period and Allowed opportunity for questions and acknowledgement of understanding      Vitals:  Vitals Value Taken Time   /88 05/14/24 1216   Temp     Pulse 83 05/14/24 1223   Resp 10 05/14/24 1223   SpO2 100 % 05/14/24 1223   Vitals shown include unfiled device data.    Electronically Signed By: Guillermo Alfaro MD  May 14, 2024  12:23 PM

## 2024-05-14 NOTE — ANESTHESIA PROCEDURE NOTES
Airway       Patient location during procedure: OR       Procedure Start/Stop Times: 5/14/2024 8:47 AM  Staff -        Anesthesiologist:  Blade Davis DO       Resident/Fellow: Guillermo Alfaro MD       Performed By: resident  Consent for Airway        Urgency: elective  Indications and Patient Condition       Indications for airway management: mary-procedural       Induction type:intravenous       Mask difficulty assessment: 1 - vent by mask    Final Airway Details       Final airway type: endotracheal airway       Successful airway: ETT - single  Endotracheal Airway Details        ETT size (mm): 7.0       Cuffed: yes       Successful intubation technique: direct laryngoscopy       DL Blade Type: MAC 3       Cormack & Lehane grading: Grade 2B.       Adjucts: stylet and bougie       Position: Right       Measured from: lips       Secured at (cm): 22       Bite block used: Soft    Post intubation assessment        Placement verified by: capnometry, equal breath sounds and chest rise        Number of attempts at approach: 2       Number of other approaches attempted: 2       Ease of procedure: easy       Dentition: Intact    Medication(s) Administered   Medication Administration Time: 5/14/2024 8:47 AM    Additional Comments       Unable to visualize epiglottis or cords with Newman II blade. Switched to MAC3 blade and obtained Grade IIB view with cricoid pressure. Bougie used to enter vocal cords/trachea. 7.0 tube slipped over bougie for successful intubation.

## 2024-05-14 NOTE — PROGRESS NOTES
"Transplant Surgery Post-Op Check    S: Patient reports feeling sore and notes pain in abdomen which is tolerable with available pain meds. Endorses mild nausea, declines PRN Compazine. Tolerating ice chips.     O:   Vitals: /70 (Cuff Size: Adult Regular)   Pulse 77   Temp 98.6  F (37  C) (Oral)   Resp 13   Ht 1.64 m (5' 4.57\")   Wt 76.6 kg (168 lb 14 oz)   SpO2 97%   BMI 28.48 kg/m      Gen: in no apparent distress  Skin: warm, dry   CV: Well perfused  Resp: Unlabored  GI: abdomen non-distended. Midline incision and lap sites closed with liquid bandage; abdominal binder in place.  : Araiza in place with pale clear UOP  Ext: MAEI, no edema noted  Neuro: Drowsy, orientedx4    A/P: Jazzy ALO Agosto is a 54 year old female with a history of DLD and DJD of knees who is now s/p Laparoscopic hand assisted non-directed right donor nephrectomy 5/14/24 with Dr. Barker.     -PRN and scheduled analgesics  -PRN antiemetics  -D5LR 60 mL/hr  -Regular diet  -Monitor I&O  -IS/CDB Q1H  -OOB this evening    Rose Jurado NP   Transplant Surgery #[id number]    "

## 2024-05-14 NOTE — ANESTHESIA POSTPROCEDURE EVALUATION
Patient: Jazzy Agosto    Procedure: Procedure(s):  Laparoscopic Hand Assisted (Anonymous) Living Non-Directed Kidney Donor       Anesthesia Type:  General    Note:  Disposition: Admission   Postop Pain Control: Uneventful            Sign Out: Well controlled pain   PONV: No   Neuro/Psych: Uneventful            Sign Out: Acceptable/Baseline neuro status   Airway/Respiratory: Uneventful            Sign Out: Acceptable/Baseline resp. status   CV/Hemodynamics: Uneventful            Sign Out: Acceptable CV status; No obvious hypovolemia; No obvious fluid overload   Other NRE: NONE   DID A NON-ROUTINE EVENT OCCUR? No           Last vitals:  Vitals Value Taken Time   /70 05/14/24 1330   Temp 37  C (98.6  F) 05/14/24 1315   Pulse 74 05/14/24 1336   Resp 11 05/14/24 1336   SpO2 97 % 05/14/24 1336   Vitals shown include unfiled device data.    Electronically Signed By: Denny Long MD  May 14, 2024  1:36 PM

## 2024-05-14 NOTE — PROGRESS NOTES
Admitted/transferred from: PACU   2 RN full   skin assessment completed by Sarai LI and Poppy Almazan RN  Skin assessment findin lap sites with derma bonded, dry heels, some redness around right side of neck     other abdominal binder in place when patient out of bed.       POD#0 Laparoscopic Hand Assisted  Kidney Donor   Shift: 5928-4515  VS: VSS on RA  Pain: 2-3/10 refused pain medication  Neuro: alert and oriented x 4  Cardiac: WNL  Respiratory: on RA - denies SOB. Oxygen drops at upper 80's when pt snores.    Diet/Appetite:  regular diet. Patient started on clear liquid diet. Intermittent nausea, given IV zofran with good results.  /GI: gill cath with adequate urine output. No gas.   LDA's: PIV infusing D5 LR at 60 mL/hr   Skin: see note above   Activity: dangled feet at the bedside and got up with assistance of two nurses. Pt feeling dizzy and drowsy    Pertinent Labs/: , WBC 12.3, hemoglobin and hematocrit recheck tomorrow AM      Plan: continue to monitor.

## 2024-05-14 NOTE — BRIEF OP NOTE
[facility] Brief Operative Note    Pre-operative diagnosis: Encounter for donation of kidney [Z52.4]   Post-operative diagnosis * smae   Procedure: Procedure(s):  Laparoscopic Hand Assisted (Anonymous) Living Non-Directed Kidney Donor   Surgeon(s): Surgeons and Role:     * Kelly Barker MD - Primary     * Daniel Moeller MD - Fellow - Assisting     * Alok Kaye MD - Fellow - Assisting   Estimated blood loss: 25 mL    Specimens: * No specimens in log *   Findings: Right nephrectomy

## 2024-05-15 VITALS
TEMPERATURE: 98.2 F | HEIGHT: 65 IN | BODY MASS INDEX: 28.89 KG/M2 | WEIGHT: 173.4 LBS | HEART RATE: 71 BPM | OXYGEN SATURATION: 97 % | SYSTOLIC BLOOD PRESSURE: 124 MMHG | RESPIRATION RATE: 16 BRPM | DIASTOLIC BLOOD PRESSURE: 66 MMHG

## 2024-05-15 LAB
ALBUMIN UR-MCNC: NEGATIVE MG/DL
APPEARANCE UR: CLEAR
BILIRUB UR QL STRIP: NEGATIVE
BUN SERPL-MCNC: 10.7 MG/DL (ref 6–20)
COLOR UR AUTO: ABNORMAL
CREAT SERPL-MCNC: 1.18 MG/DL (ref 0.51–0.95)
EGFRCR SERPLBLD CKD-EPI 2021: 55 ML/MIN/1.73M2
GLUCOSE UR STRIP-MCNC: NEGATIVE MG/DL
HCT VFR BLD AUTO: 39.4 % (ref 35–47)
HGB BLD-MCNC: 13.3 G/DL (ref 11.7–15.7)
HGB UR QL STRIP: ABNORMAL
KETONES UR STRIP-MCNC: NEGATIVE MG/DL
LEUKOCYTE ESTERASE UR QL STRIP: NEGATIVE
NITRATE UR QL: NEGATIVE
PH UR STRIP: 6.5 [PH] (ref 5–7)
RBC URINE: 0 /HPF
SP GR UR STRIP: 1 (ref 1–1.03)
SQUAMOUS EPITHELIAL: <1 /HPF
TRANSITIONAL EPI: <1 /HPF
UROBILINOGEN UR STRIP-MCNC: NORMAL MG/DL
WBC URINE: 0 /HPF

## 2024-05-15 PROCEDURE — 250N000011 HC RX IP 250 OP 636: Performed by: STUDENT IN AN ORGANIZED HEALTH CARE EDUCATION/TRAINING PROGRAM

## 2024-05-15 PROCEDURE — 85018 HEMOGLOBIN: CPT | Performed by: STUDENT IN AN ORGANIZED HEALTH CARE EDUCATION/TRAINING PROGRAM

## 2024-05-15 PROCEDURE — 82565 ASSAY OF CREATININE: CPT | Performed by: STUDENT IN AN ORGANIZED HEALTH CARE EDUCATION/TRAINING PROGRAM

## 2024-05-15 PROCEDURE — 81001 URINALYSIS AUTO W/SCOPE: CPT | Performed by: STUDENT IN AN ORGANIZED HEALTH CARE EDUCATION/TRAINING PROGRAM

## 2024-05-15 PROCEDURE — 250N000013 HC RX MED GY IP 250 OP 250 PS 637: Performed by: STUDENT IN AN ORGANIZED HEALTH CARE EDUCATION/TRAINING PROGRAM

## 2024-05-15 PROCEDURE — 84520 ASSAY OF UREA NITROGEN: CPT | Performed by: STUDENT IN AN ORGANIZED HEALTH CARE EDUCATION/TRAINING PROGRAM

## 2024-05-15 PROCEDURE — 250N000013 HC RX MED GY IP 250 OP 250 PS 637

## 2024-05-15 PROCEDURE — 36415 COLL VENOUS BLD VENIPUNCTURE: CPT | Performed by: STUDENT IN AN ORGANIZED HEALTH CARE EDUCATION/TRAINING PROGRAM

## 2024-05-15 RX ORDER — ONDANSETRON 4 MG/1
4 TABLET, ORALLY DISINTEGRATING ORAL EVERY 8 HOURS PRN
Qty: 15 TABLET | Refills: 0 | Status: ON HOLD | OUTPATIENT
Start: 2024-05-15 | End: 2024-08-07

## 2024-05-15 RX ORDER — OXYCODONE HYDROCHLORIDE 5 MG/1
5 TABLET ORAL EVERY 4 HOURS PRN
Status: DISCONTINUED | OUTPATIENT
Start: 2024-05-15 | End: 2024-05-15 | Stop reason: HOSPADM

## 2024-05-15 RX ORDER — METHOCARBAMOL 500 MG/1
500 TABLET, FILM COATED ORAL 4 TIMES DAILY PRN
Qty: 8 TABLET | Refills: 0 | Status: ON HOLD | OUTPATIENT
Start: 2024-05-15 | End: 2024-08-07

## 2024-05-15 RX ORDER — AMOXICILLIN 250 MG
1 CAPSULE ORAL 2 TIMES DAILY
Qty: 30 TABLET | Refills: 0 | Status: ON HOLD | OUTPATIENT
Start: 2024-05-15 | End: 2024-08-07

## 2024-05-15 RX ORDER — METHOCARBAMOL 500 MG/1
500 TABLET, FILM COATED ORAL 4 TIMES DAILY PRN
Status: DISCONTINUED | OUTPATIENT
Start: 2024-05-15 | End: 2024-05-15 | Stop reason: HOSPADM

## 2024-05-15 RX ORDER — OXYCODONE HYDROCHLORIDE 5 MG/1
5 TABLET ORAL EVERY 6 HOURS PRN
Qty: 12 TABLET | Refills: 0 | Status: ON HOLD | OUTPATIENT
Start: 2024-05-15 | End: 2024-08-07

## 2024-05-15 RX ORDER — BUPIVACAINE HYDROCHLORIDE 2.5 MG/ML
INJECTION, SOLUTION EPIDURAL; INFILTRATION; INTRACAUDAL
Status: DISCONTINUED | OUTPATIENT
Start: 2024-05-14 | End: 2024-05-15

## 2024-05-15 RX ORDER — ACETAMINOPHEN 325 MG/1
975 TABLET ORAL EVERY 8 HOURS PRN
Status: DISCONTINUED | OUTPATIENT
Start: 2024-05-15 | End: 2024-05-15 | Stop reason: HOSPADM

## 2024-05-15 RX ORDER — ACETAMINOPHEN 325 MG/1
650 TABLET ORAL EVERY 6 HOURS PRN
Qty: 60 TABLET | Refills: 0 | Status: SHIPPED | OUTPATIENT
Start: 2024-05-15

## 2024-05-15 RX ADMIN — OXYCODONE HYDROCHLORIDE 5 MG: 5 TABLET ORAL at 06:31

## 2024-05-15 RX ADMIN — OXYCODONE HYDROCHLORIDE 5 MG: 5 TABLET ORAL at 14:59

## 2024-05-15 RX ADMIN — KETOROLAC TROMETHAMINE 9.9 MG: 30 INJECTION INTRAMUSCULAR; INTRAVENOUS at 05:28

## 2024-05-15 RX ADMIN — OXYCODONE HYDROCHLORIDE 5 MG: 5 TABLET ORAL at 10:41

## 2024-05-15 RX ADMIN — DOCUSATE SODIUM 50 MG AND SENNOSIDES 8.6 MG 1 TABLET: 8.6; 5 TABLET, FILM COATED ORAL at 08:57

## 2024-05-15 RX ADMIN — SODIUM CHLORIDE, SODIUM LACTATE, POTASSIUM CHLORIDE, CALCIUM CHLORIDE AND DEXTROSE MONOHYDRATE: 5; 600; 310; 30; 20 INJECTION, SOLUTION INTRAVENOUS at 04:07

## 2024-05-15 RX ADMIN — FAMOTIDINE 20 MG: 20 TABLET ORAL at 08:57

## 2024-05-15 RX ADMIN — ACETAMINOPHEN 975 MG: 325 TABLET, FILM COATED ORAL at 06:31

## 2024-05-15 RX ADMIN — ONDANSETRON 4 MG: 4 TABLET, ORALLY DISINTEGRATING ORAL at 10:43

## 2024-05-15 RX ADMIN — BISACODYL 10 MG: 10 SUPPOSITORY RECTAL at 06:24

## 2024-05-15 ASSESSMENT — ACTIVITIES OF DAILY LIVING (ADL)
ADLS_ACUITY_SCORE: 22
ADLS_ACUITY_SCORE: 19
ADLS_ACUITY_SCORE: 22
ADLS_ACUITY_SCORE: 22
ADLS_ACUITY_SCORE: 19
ADLS_ACUITY_SCORE: 22
ADLS_ACUITY_SCORE: 19
ADLS_ACUITY_SCORE: 22

## 2024-05-15 NOTE — PLAN OF CARE
Problem: Adult Inpatient Plan of Care  Goal: Optimal Comfort and Wellbeing  Outcome: Met     Problem: Adult Inpatient Plan of Care  Goal: Absence of Hospital-Acquired Illness or Injury  Outcome: Met  Intervention: Identify and Manage Fall Risk  Recent Flowsheet Documentation  Taken 5/15/2024 0730 by Lois Davidson RN  Safety Promotion/Fall Prevention:   supervised activity   safety round/check completed   room near nurse's station   increase visualization of patient   clutter free environment maintained  Intervention: Prevent Skin Injury  Recent Flowsheet Documentation  Taken 5/15/2024 0730 by Lois Davidson RN  Body Position: position changed independently  Intervention: Prevent and Manage VTE (Venous Thromboembolism) Risk  Recent Flowsheet Documentation  Taken 5/15/2024 0730 by Lois Davidson RN  VTE Prevention/Management: SCDs (sequential compression devices) off  Intervention: Prevent Infection  Recent Flowsheet Documentation  Taken 5/15/2024 0730 by Lois Davidson RN  Infection Prevention:   single patient room provided   rest/sleep promoted   Goal Outcome Evaluation:

## 2024-05-15 NOTE — PHARMACY-TRANSPLANT NOTE
Adult Kidney Transplant Post Operative Note    54 year old female s/p living non-directed kidney donor on 5/14/24.     Patient is not enrolled in medication study.    Pharmacy will monitor for medication interactions and immunosuppression levels in conjunction with the team. Medication therapy needs for discharge planning will continue to be addressed throughout the current admission via multidisciplinary rounds and order review.  Pharmacy will make recommendations as appropriate.    Leola Johnson, PharmD

## 2024-05-15 NOTE — PLAN OF CARE
"/66 (BP Location: Right arm)   Pulse 71   Temp 98.2  F (36.8  C) (Oral)   Resp 16   Ht 1.64 m (5' 4.57\")   Wt 78.7 kg (173 lb 6.4 oz)   SpO2 97%   BMI 29.24 kg/m      Shift: 5328-0955  Isolation Status: none  VS: stable on room air, afebrile  Neuro: Aox4  Behaviors: calm, cooperative   BG: none  Labs: RC=630  Respiratory: WDL  Cardiac: WDL  Pain/Nausea: mild to moderate abd pain, intermittent nausea   PRN: Oxy x1  Diet: regular  IV Access: none (PIV removed for discharge prep)  Infusion(s): none  Lines/Drains: none  GI/: BM prior to surgery/passing gas/voiding spontaneously without difficulty   Skin: 4 abd lap sites   Mobility: UAL  Plan: Patient to discharge home today       "

## 2024-05-15 NOTE — PHARMACY-ADMISSION MEDICATION HISTORY
Pharmacist Admission Medication History    Admission medication history is complete. The information provided in this note is only as accurate as the sources available at the time of the update.    Information Source(s): Patient via in-person    Pertinent Information:   - Patient reports no current Rx or OTC medications    Changes made to PTA medication list:  Added: None  Deleted: None  Changed: None    Allergies reviewed with patient and updates made in EHR: yes    Medication History Completed By: Nazia Smith PharmD, BCPS 5/15/2024 9:59 AM    No outpatient medications have been marked as taking for the 5/14/24 encounter (Hospital Encounter).

## 2024-05-15 NOTE — ANESTHESIA PROCEDURE NOTES
TAP Procedure Note    Pre-Procedure   Staff -        Anesthesiologist:  Cyndy Gonzalez MD       Resident/Fellow: Guillermo Alfaro MD       Performed By: resident       Location: OR       Procedure Start/Stop Times: 5/14/2024 8:00 AM       Pre-Anesthestic Checklist: patient identified, IV checked, site marked, risks and benefits discussed, informed consent, monitors and equipment checked, pre-op evaluation, at physician/surgeon's request and post-op pain management  Timeout:       Correct Patient: Yes        Correct Procedure: Yes        Correct Site: Yes        Correct Position: Yes        Correct Laterality: Yes        Site Marked: Yes  Procedure Documentation  Procedure: TAP       Laterality: bilateral       Patient Position: supine       Skin prep: Chloraprep       Insertion Site: T8-9, T7-8.       Needle Type: short bevel       Needle Gauge: 21.        Needle Length (Inches): 3.13        Needle Length (millimeters): 110        Ultrasound guided       1. Ultrasound was used to identify targeted nerve, plexus, vascular marker, or fascial plane and place a needle adjacent to it in real-time.       2. Ultrasound was used to visualize the spread of anesthetic in close proximity to the above referenced structure.       3. A permanent image is entered into the patient's record.       4. The visualized anatomic structures appeared normal.       5. There were no apparent abnormal pathologic findings.    Assessment/Narrative         The placement was negative for: blood aspirated, painful injection and site bleeding       Paresthesias: No.       Bolus given via needle..        Secured via.        Insertion/Infusion Method: Single Shot    Medication(s) Administered   Bupivacaine 0.25% PF (Infiltration) - Infiltration   20 mL - 5/14/2024 8:00:00 AM  Bupivacaine liposome (Exparel) 1.3% LA inj susp (Infiltration) - Infiltration   20 mL - 5/14/2024 8:00:00 AM  Medication Administration Time: 5/14/2024 8:00 AM      FOR  "[facility] ONLY:   Pain Team Contact information: please page the Pain Team Via [employer]. Search \"Pain\". During daytime hours, please page the attending first. At night please page the resident first.      "

## 2024-05-15 NOTE — PLAN OF CARE
DISCHARGE:  Patient with orders to discharge to Home.     Discharge instructions, medications & follow ups reviewed with patient. Copy of discharge summary given to Patient. PIV removed.    Patient in stable condition. AVSS. Patient had no further questions regarding discharge instructions and medications. Patient transferred out by self & left with family.

## 2024-05-15 NOTE — PROGRESS NOTES
Transplant Admission Psychosocial Assessment/Discharge    Patient Name: Jazzy Agosto  : 1970  Age: 54 year old  MRN: [id number]  Date of Initial Social Work Evaluation: one    LIVING DONOR SOCIAL WORK NOTE:  D:  Jazzy is a living kidney donor, POD one.  I:  I met with Jazzy at bedside to thank her for her donation and to assess for any psychosocial needs and to assist with discharge planning.  I assessed the patient's mood/affect, plans for recovery, and any feelings of regret or remorse regarding donation.   A:  Jazzy is resting/up walking on the unit/in their bed. She appears to be in a pleasant mood and affect is congruent. She states that pain is well controlled. She reports that her recipient is doing well so far. Patient describes donation recovery as expected with some nausea so far. She and I discussed how to reach me should she have any post operative psychosocial needs. She reports no feelings of regret or remorse about donation.  She denies any discharge planning needs at this time.  Patient plans to discharge to home with the care and support of her family.   P: Donor  will remain available to assist with any psychosocial needs, both inpatient and outpatient, as needed.  Patient is aware of follow-up recommendations and has my contact information.       Presenting Information   Living Situation: with   If not local, plans for short term stay:  n/a   Previous Functional Status: independent  Cultural/Language/Spiritual Considerations: none discussed     Support System  Primary Support Person    Other support:  adult children  Plan for support in immediate post-transplant period: home with family    Health Care Directive  Decision Maker:   Alternate Decision Maker: children  Health Care Directive: Provided education    Mental Health/Coping:   History of Mental Health: none  History of Chemical Health: none  Current status: appropriate  Coping:  appropriate   Services Needed/Recommended: continued social work support    Financial   Income: wages  Impact of transplant on income: none  Insurance and medication coverage: yes  Financial concerns: none identified  Resources needed: continued SW support      Assessment and recommendations and plan:  Living donor SW to remain available post discharge to home.     FATOU Pearce, [facility]  Living Donor   Phone: [phone]  Pager: [phone]  [email or web address]

## 2024-05-15 NOTE — PLAN OF CARE
"Goal Outcome Evaluation:  /67 (BP Location: Right arm)   Pulse 83   Temp 98  F (36.7  C) (Oral)   Resp 16   Ht 1.64 m (5' 4.57\")   Wt 76.6 kg (168 lb 14 oz)   SpO2 98%   BMI 28.48 kg/m      Shift: 0318-0674  VS: Vitals stable on room air, afebrile  Neuro: Alert and oriented x4  Pain/Nausea: Compazine x1, scheduled toradol, tylenol x1, oxy x1   Diet: Regular   IV Access: PIV x1  Infusion(s): D5LR at 60  GI/: Araiza in place overnight, removed at 0600, no BM, not passing gas  Skin: 4 lap sites derma bonded CHRISTIANO  Mobility: Up with SBA   Plan: Continue with POC and notify team with any changes   "

## 2024-05-15 NOTE — PHARMACY-TRANSPLANT NOTE
Solid Organ Transplant Donor Prior to Discharge Note  54 year old female s/p kidney donation surgery on 5/14/24.     Pharmacy has monitored for any potential medication issues.  In anticipation for discharge, medication therapy needs have been addressed daily throughout the current admission via multidisciplinary rounds and/or discussions, order verification, daily clinical pharmacy review, and communication with prescribers.     Leola Johnson, PharmD

## 2024-05-15 NOTE — PLAN OF CARE
SW will follow patient plan of care as it progresses and provide post discharge support as part of living donor program.       FATOU Pearce, [facility]  Living Donor   Phone: [phone]  Pager: [phone]  [email or web address]

## 2024-05-15 NOTE — DISCHARGE SUMMARY
[facility]    Discharge Summary  Solid Organ Transplant Surgery    Date of Admission:  5/14/2024  Date of Discharge:  No discharge date for patient encounter.  Date of Service (when I saw the patient): 05/15/24    Discharge Diagnoses   Active Problems:    Encounter for donation of kidney      Procedure/Surgery Information   Procedure: Procedure(s):  Laparoscopic Hand Assisted (Anonymous) Living Non-Directed Kidney Donor   Surgeon(s): Surgeons and Role:     * Kelly Barker MD - Primary     * Daniel Moeller MD - Fellow - Assisting     * Alok Kaye MD - Fellow - Assisting             History of Present Illness   Jazzy Agosto is a 54 year old female with PMH significant for DJD and Dyslipidemia who presented following nephrectomy for kidney donation surgery.    Hospital Course   Jazzy Agosto was admitted on 5/14/2024 and underwent nephrectomy for kidney donation. On POD #1 she was tolerating an oral diet, ambulating independently, and pain adequately controlled by day of discharge.    Post-operative pain control included TA{ block, Hydromorphone (dilaudid) IV, and Toradol and will be Oxycodone, Tylenol  , and Robaxin on discharge.  The patient was instructed to avoid NSAID medications.    Catherine Byrd NP      Discharge Disposition   Discharged to home   Condition at discharge: Stable    Primary Care Physician   Charmaine Gonzalez    Physical Exam   Temp: 98.2  F (36.8  C) Temp src: Oral BP: 124/66 Pulse: 71   Resp: 16 SpO2: 97 % O2 Device: None (Room air) Oxygen Delivery: 1 LPM  Vitals:    05/14/24 0619 05/15/24 0530   Weight: 76.6 kg (168 lb 14 oz) 78.7 kg (173 lb 6.4 oz)     Vital Signs with Ranges  Temp:  [97.6  F (36.4  C)-98.6  F (37  C)] 98.2  F (36.8  C)  Pulse:  [67-83] 71  Resp:  [10-16] 16  BP: ()/(53-85) 124/66  SpO2:  [94 %-100 %] 97 %  I/O last 3 completed shifts:  In: 3580 [P.O.:480; I.V.:3100]  Out: 2800 [Urine:2775;  "Blood:25]    General Appearance: in no apparent distress.   Skin: normal, warm, dry  Heart: regular rate and rhythm  Lungs: clear to auscultation, without wheezes  Abdomen: The abdomen is soft, slightly distended, appropriately tender to palpation, Incision with surgical glue, mild ecchymosis present.   : voiding   Extremities: edema: absent.   Neurologic: awake, alert, and oriented.           Consultations This Hospital Stay   PHARMACY IP CONSULT  SOT MEDICATION HISTORY IP PHARMACY CONSULT  NURSING TO CONSULT FOR VASCULAR ACCESS CARE IP CONSULT  CARE MANAGEMENT / SOCIAL WORK IP CONSULT    Time Spent on this Encounter   I have spent greater than 30 minutes on this discharge.    Discharge Orders      hCG Quantitative Pregnancy     Reason for your hospital stay    Kidney donation surgery     Activity    Don't lift anything heavier than 10 pounds for 8 weeks (or longer if your surgeon has discussed this with you).  Walk regularly.    OK to drive only after no longer taking narcotics AND you feel comfortable working the breaks/clutch suddenly if needed.  Limit sports and strenuous activities/'core' exercises for 8 weeks.  If you experience pain after exertion, try an ice pack or warm pack to the area.   Wear abdominal binder for comfort if you desire.    You have been instructed to avoid NSAIDs (medications such as ibuprofen, \"Motrin\", naproxen, diclofenac) as these medications may affect the remaining kidney. Take other medications as prescribed.    Keep narcotics out of reach of children. When finished with them, please destroy/discard any remaining pills responsibly. Often, your [facility] government office, local police station or pharmacy will have a drug deposit box.     Adult [facility] Follow-up and recommended labs and tests    Follow up with Dr. Chris Edward MD in Transplant Clinic in 2-3 weeks.  If you need to change your appointment please contact your coordinator at [phone].     Appointments on " [facility] and/or [facility] (with [facility] or [facility] provider or service). Call [phone] if you haven't heard regarding these appointments within 7 days of discharge.     When to contact your care team    Call your transplant coordinator if you have any of the following:   Swelling, oozing, worsening pain, unusual redness around the incision  Fever of 101 F or higher   Increasing abdominal pain   Nausea or vomiting   Severe diarrhea, bloating, or constipation     Any concerns or questions, please call your Transplant coordinator:    Phone: [phone].  If they are not available, the on call coordinator/MD will help you with your concern.  If you are unable to reach a coordinator and have an urgent medical questions, please call the hospital at [phone] and ask to have the Pancreas Transplant Surgery fellow on-call paged.     Wound care and dressings    Your incisions have GLUE, gently wash with soap and water, but do not scrub. Do not soak in a bath until the glue has naturally fallen off and any openings are closed (at least 2 weeks).     Diet    Keep yourself well hydrated (goal 1500 ml/day).   Eat lightly the first week as tolerated and avoid constipating foods.  If you are constipated, take a stool softener like Senna, Dulcolax, Miralax, or a Fleets enema (available over the counter).     Discharge Medications   Current Discharge Medication List        START taking these medications    Details   acetaminophen (TYLENOL) 325 MG tablet Take 2 tablets (650 mg) by mouth every 6 hours as needed for mild pain or fever  Qty: 60 tablet, Refills: 0    Associated Diagnoses: Encounter for donation of kidney      methocarbamol (ROBAXIN) 500 MG tablet Take 1 tablet (500 mg) by mouth 4 times daily as needed for muscle spasms  Qty: 8 tablet, Refills: 0    Associated Diagnoses: Encounter for donation of kidney      oxyCODONE (ROXICODONE) 5 MG tablet Take 1 tablet (5 mg) by mouth every 6 hours as needed for severe pain  "or moderate to severe pain  Qty: 12 tablet, Refills: 0    Associated Diagnoses: Encounter for donation of kidney      senna-docusate (SENOKOT-S/PERICOLACE) 8.6-50 MG tablet Take 1 tablet by mouth 2 times daily  Qty: 30 tablet, Refills: 0    Associated Diagnoses: Transplant donor evaluation           Allergies   Allergies   Allergen Reactions    Sertraline Unknown     \"Didn't work, felt more depressed\"     Data   Most Recent 3 CBC's:  Recent Labs   Lab Test 05/15/24  0552 05/14/24  1244 05/06/24  1208 01/18/24  0659   WBC  --  12.3*  --  4.9   HGB 13.3 13.2 15.5 15.1   MCV  --  92  --  90   PLT  --  178  --  233      Most Recent 3 BMP's:  Recent Labs   Lab Test 05/15/24  0552 05/14/24  0621 05/06/24  1208 01/18/24  0659   NA  --   --   --  140   POTASSIUM  --   --   --  3.8   CHLORIDE  --   --   --  105   CO2  --   --   --  25   BUN 10.7  --   --  9.7   CR 1.18*  --  0.83 0.77   ANIONGAP  --   --   --  10   STU  --   --   --  9.2   GLC  --  90  --  91     Most Recent 2 LFT's:  Recent Labs   Lab Test 01/18/24  0659   AST 19   ALT 11   ALKPHOS 80   BILITOTAL 0.4     Most Recent INR's and Anticoagulation Dosing History:  Anticoagulation Dose History          Latest Ref Rng & Units 1/18/2024   Recent Dosing and Labs   INR 0.85 - 1.15 1.13      Most Recent 3 Troponin's:No lab results found.  Most Recent Cholesterol Panel:  Recent Labs   Lab Test 01/18/24  0659   CHOL 204*   *   HDL 44*   TRIG 74     Most Recent 6 Bacteria Isolates From Any Culture (See EPIC Reports for Culture Details):No lab results found.  Most Recent TSH, T4 and A1c Labs:  Recent Labs   Lab Test 01/18/24  0659   A1C 5.5     Results for orders placed or performed in visit on 05/06/24   XR Chest 2 Views    Narrative    EXAM: XR CHEST 2 VIEWS  5/6/2024 11:39 AM     HISTORY:  Encounter for donation of kidney       COMPARISON:  1/18/2024    FINDINGS: Two views of the chest.    Trachea is midline. Cardiomediastinal silhouette is within normal  limits. " No focal airspace mass or consolidation. No visualized  pneumothorax or pleural effusion. Bones are grossly normal.      Impression    IMPRESSION: Normal.    I have personally reviewed the examination and initial interpretation  and I agree with the findings.    AYANA GARCIA MD         SYSTEM ID:  [id number]    I have reviewed history, examined patient and discussed plan with the fellow/resident/TIMOTEO.    I concur with the findings in this note.    Time spent on discharge activities: 45 minutes.

## 2024-05-15 NOTE — PROGRESS NOTES
INDEPENDENT LIVING DONOR ADVOCATE NOTE:  D:  Jazzy Agosto is a living Nondirected  donor, POD 1.  I:  I met with her at bedside to thank her for kidney donation and to assess for any MEHDI needs.  I assessed the patient's mood/affect, plans for recovery, and any feelings of regret/remorse regarding donation.  We reviewed the importance of completing follow-up labs and surveys at six months, 1 year and 2 years after donation to monitor kidney health and the impact donation has had on their life post donation.   A:  She is resting comfortably in bed on the unit.  She reports still having some nausea and not sure that she will be ready to leave today. She appears to be in a neutral mood and affect is congruent.  She states that pain is well controlled.  She reports that it is her understanding that the recipient  is here at our hospital.  Patient describes donation recovery as going well, and slightly better than she had expected .  She and I discussed how to reach me should she have any post operative MEHDI needs.  She reports no feelings of regret or remorse about donation.  She denies any discharge planning needs at this time.  Patient plans to discharge to home with the care and support of her family.   P: MEHDI will remain available to assist with any support and MEHDI needs, both inpatient and outpatient, as needed.  Patient is aware of follow-up recommendations and has my contact information.

## 2024-05-17 ENCOUNTER — TELEPHONE (OUTPATIENT)
Dept: TRANSPLANT | Facility: CLINIC | Age: 54
End: 2024-05-17
Payer: COMMERCIAL

## 2024-05-17 DIAGNOSIS — Z52.4 ENCOUNTER FOR DONATION OF KIDNEY: Primary | ICD-10-CM

## 2024-05-17 RX ORDER — METHOCARBAMOL 500 MG/1
500 TABLET, FILM COATED ORAL 4 TIMES DAILY PRN
Qty: 12 TABLET | Refills: 0 | Status: ON HOLD | OUTPATIENT
Start: 2024-05-17 | End: 2024-08-07

## 2024-05-17 RX ORDER — OXYCODONE HYDROCHLORIDE 5 MG/1
5 TABLET ORAL EVERY 6 HOURS PRN
Qty: 12 TABLET | Refills: 0 | Status: SHIPPED | OUTPATIENT
Start: 2024-05-17 | End: 2024-05-20

## 2024-05-17 NOTE — TELEPHONE ENCOUNTER
Post-hospital outreach call made to check in post 3 days donation.     Jazzy reports overall feeling okay.    Incision:clean, dry, intact  Pain: mostly incisional pain, taking oxy and muscle relaxer about three times a day- Jazzy will need a refill to have enough to get through the weekend- will send refill to local pharmacy  Bowels: passing gas, no BM since surgery- we discussed Jazzy using more miralax today and possibly laxative or enema to make sure she has BM today or tomorrow. Jazzy is also going to try prune juice as well   Appetite: good, tolerating food well   Fluids: able to maintain good hydration   Urinary: no issues urinating   Activity: up and walking without difficulty, using abd binder when walking.     Reviewed: abdominal precautions, lifting restrictions.    Next appointment: 06/03 surgeon follow up    Reminded Jazzy about UNOS follow-up requirements.     Jazzy knows how to get in touch with me or an on call coordinator with any questions or concerns yet today or over the weekend. I will follow up with Jazzy again on Monday. Chaka verbalized understanding of discussion and is in agreement with plan.

## 2024-05-17 NOTE — OP NOTE
Transplant Surgery  Operative Note    Procedure Date: 05/14/24   Preoperative Diagnosis: Healthy kidney donor   Postoperative Diagnosis: Healthy kidney donor   Procedure: Right Kidney  living donor nephrectomy for donoation, hand assist laparoscopic   Secondary Procedure:  N/a   Surgeon:    Surgeons and Role:     * Kelly Barker MD - Primary     * Daniel Moeller MD - Fellow - Assisting     * Alok Kaye MD - Fellow - Assisting   Fellow/Assistant:     There was no qualified resident to assist with this procedure.     Specimen: Right kidney and ureter   Anesthesia: General   Urine Output: yes Estimated Blood Loss: 50 mls  Fluids Administered:  See anesthesia record.       Intra Op Events: none     Complications: None.    Findings: single single single      None.      Donor UNOS ID:  [id number] Flush Start time: 5/14/2024 10:50 AM   Arterial Clamp:  5/14/2024 10:46 AM Arterial anatomy: single   Venous anatomy:  single Ureteral anatomy:  single     Indication: Jazzy Agosto presents for Right Kidney donation. The patient has undergone a thorough medical and psychosocial evaluation and was found suitable for voluntary kidney donation. Risks and benefits of donation were discussed. The patient expressed understanding, was willing to proceed, and provided informed consent.    Final ABO/Crossmatch verification: Prior to incision, I verified the donor ABO and recipient ABO.  I visually verified that the donor identification, blood type, and other vital data were compatible with the recipient.     Operative Procedure: Jazzy Agosto was transported to the operating room, placed on the operating table in the left lateral decubitus position and a universal timeout was performed. Sequential compression devices were placed on both lower extremities and general endotracheal anesthesia was induced.  The patient was given IV antibiotics and Araiza catheter.  The abdomen was shaved, prepped, and draped in the usual  sterile fashion.    We made a 6 cm periumbilical midline incision and carried it down thru the linea alba.  The peritoneum was opened under direct vision.  The hand port was put into position and a right lower quadrant 10 mm port was placed over a trocar with hand assistance.  Pneumoperitoneum with CO2 was provided to 15 mmHg.  General survey with the laparoscope revealed no unusual findings.  An additional 10 mm port was placed just to the right of the midline in the upper abdomen under direct vision.    The right colon was released from its lateral attachments and rotated medially, revealing the kidney, ureter, duodenum and vena cava. The ureter was circumferentially dissected free distally then proximally taking care to preserve its vasculature.  We  identified the gonadal vein and dissected enbloc with the ureter. The proximal gonadal vein was ligated and divided near the insertion into the cava.      We placed a 5 mm port in the right flank for a retractor, placed to lift the liver and gallbladder anteriorly. The duodenum was fully Kocherized and the anterior surface of the vena cava was cleared of investing tissue. Gerota's fascia was incised along the upper pole of the kidney and a plane was created between the kidney and the adrenal gland with the harmonic scalpel. The renal vein was then circumferentially cleared of extraneous tissue. The lower aspect of the renal artery was identified and cleared of investing lymphatics. We dissected  the kidney and ureter free posteriorly from the psoas up to the level of the renal artery.  The patient was given fluid, mannitol and Lasix, and the kidney was then dissected free from its lateral and superior attachments allowing full medial rotation. The renal artery was then circumferentially cleared of lymphatics and fat proximally toward its origin and behind the cava. The patient was heparinized and the distal ureter and gonadal vein were clipped and divided.  Good  urine flow was seen. A laparoscopic stapler was used fired across the renal artery and vein.     The kidney was delivered from the hand port, flushed, and taken to recipient room. Protamine was administered for full heparin reversal. Pneumoperitoneum was reestablished and hemostasis was obtained. Vascular transection sites were visualized and confirmed secure. The colon was placed back in its natural position. The 10 mm port sites were closed with 0-vicryl. The hand port was closed with 0-PDS. Skin incisions were irrigated and closed with 4-0 monocryl and Dermabond. Needle, sponge, and instrument counts were correct x2.  Faculty was present for key portions of the procedure. The patient tolerated the procedure well without apparent complications and was extubated and transferred to PACU in good condition.    I was present during the key portions of the procedure, and I was immediately available for the entire procedure. There was no qualified resident available to assist with the entire procedure. The fellow noted above Dr Kaye participated as the first assistant in all parts of the dictated procedure and Dr Moeller was the primary in the opening and closure with assistance from me as needed.

## 2024-05-20 ENCOUNTER — TELEPHONE (OUTPATIENT)
Dept: TRANSPLANT | Facility: CLINIC | Age: 54
End: 2024-05-20
Payer: COMMERCIAL

## 2024-05-20 NOTE — TELEPHONE ENCOUNTER
Post-hospital outreach call made to check in post 6 days donation.     Jazzy reports overall feeling okay.    Incision:clean, dry, intact  Pain: oxy at night otherwise tylenol alone, incisional pain  Bowels: patient had first BM Saturday evening, abdominal distention is better since having Bms, pain has improved as well   Appetite: poor- we discussed small frequent meals until appetitie resumes   Fluids: able to maintain hydration  Urinary: no issues urinating   Activity: up and walking without difficulty, using abd binder when walking.     Reviewed: abdominal precautions, lifting restrictions.    Next appointment: 6/3 surgeon visit     Reminded Jazzy about UNOS follow-up requirements.     Diane knows how to get in touch with me or an on call coordinator with any questions or concerns.

## 2024-05-21 ENCOUNTER — TELEPHONE (OUTPATIENT)
Dept: TRANSPLANT | Facility: CLINIC | Age: 54
End: 2024-05-21
Payer: COMMERCIAL

## 2024-05-21 NOTE — TELEPHONE ENCOUNTER
Post-hospital outreach call made to check in post 7 days donation.     Jazzy reports overall feeling okay, improving day by day.    Incision:clean, dry, intact  Pain: manageable with tylenol throughout the day and oxy at night  Bowels: BMS daily now   Appetite: fair, we discussed small frequent meals  Fluids: able to maintain hydration   Urinary: no issues urinating   Activity: up and walking without difficulty, using abd binder when walking.     Jazzy did state she is feeling emotional and became tearful during conversation. I listened and assured Jazzy that being emotional is a normal part of recovery- almost like post surgery blues. I asked Jazzy to reach out if the emotions do not regulate or feel like they are worsening. Message sent to [facility] to check in on Jazzy today and later this week.     Reviewed: abdominal precautions, lifting restrictions.    Reminded Jazzy about UNOS follow-up requirements.     Jazzy knows how to get in touch with me or an on call coordinator with any questions or concerns in the next few days. Diane verbalized understanding of discussion and is in agreement with plan.

## 2024-05-22 ENCOUNTER — TELEPHONE (OUTPATIENT)
Dept: TRANSPLANT | Facility: CLINIC | Age: 54
End: 2024-05-22
Payer: COMMERCIAL

## 2024-05-22 DIAGNOSIS — Z52.4 ENCOUNTER FOR DONATION OF KIDNEY: Primary | ICD-10-CM

## 2024-05-22 DIAGNOSIS — Z52.4 KIDNEY DONOR: ICD-10-CM

## 2024-05-22 NOTE — TELEPHONE ENCOUNTER
Jazzy, called to report that on the side of her incision.     The glue part was coming off and leaking so bandaide was applied.    Does not feel warm. Scab was there yesterday. It does sting tonight.   No chills. Did not check temp, but will check temperature.  Paged Dr Kaye.     Per dr Kaye. Pt to see NP tomorrow for in person. Order placed for follow up appt requst. Jazzy updated and encouraged to call with any changes or concerns.

## 2024-05-22 NOTE — TELEPHONE ENCOUNTER
"MEHDI called Jazzy for a check in as she was tearful yesterday with the nurse over the phone. She is about 1 week post kidney donation. She states that she feels really emotional when she frelies on her family to do things for her. She also thinks its hard for her to face that she cannot do everything she wants to do. Then, she was having trouble having a BM and this worried her family, which increased her anxiety, as well. She knows that this is \"normal\" and has a therapy appointment today with her therapist.     MEHDI discussed the roller coaster phenomenon after a surgery, as erll as the \"grief\" that can occur after donation, as the adrenaline has worn off, etc. MEHDI also honored that she is not used to people having to take care of her. She expressed understanding of this and will reach out for more support as needed.     Ese Ramirez Central Maine Medical CenterSTEFANY, CCTSW   Independent Living Donor Advocate  [facility], [facility], [facility]  Direct: [phone]  E-Mail: [email or web address]      "

## 2024-05-23 ENCOUNTER — OFFICE VISIT (OUTPATIENT)
Dept: TRANSPLANT | Facility: CLINIC | Age: 54
End: 2024-05-23
Attending: NURSE PRACTITIONER

## 2024-05-23 VITALS
SYSTOLIC BLOOD PRESSURE: 153 MMHG | DIASTOLIC BLOOD PRESSURE: 95 MMHG | TEMPERATURE: 97.4 F | WEIGHT: 167.2 LBS | OXYGEN SATURATION: 99 % | BODY MASS INDEX: 28.2 KG/M2 | HEART RATE: 86 BPM

## 2024-05-23 DIAGNOSIS — Z52.4 KIDNEY DONOR: ICD-10-CM

## 2024-05-23 PROCEDURE — 99024 POSTOP FOLLOW-UP VISIT: CPT | Performed by: NURSE PRACTITIONER

## 2024-05-23 PROCEDURE — 99213 OFFICE O/P EST LOW 20 MIN: CPT | Performed by: NURSE PRACTITIONER

## 2024-05-23 NOTE — LETTER
5/23/2024         RE: Jazzy Agosto  [address] [facility] [address]        Dear Colleague,    Thank you for referring your patient, Jazzy Agosto, to the [facility]. Please see a copy of my visit note below.    Transplant Surgery Kidney Donor Progress Note     Medical record number: [id number]  YOB: 1970,   Date of Visit:  05/23/2024  For followup after kidney donation.    Assessment and Recommendations: Ms. Agosto is doing well after kidney donation.     S/p kidney donation 5/14/24  - R lap site with surgical glue and clear suture fell off. Site healing well. No drainage. Steris applied and reassured. Report back any changes.         Case discussed with Dr. Barker     Total time: 15 minutes  Counselling time: 10 minutes    KENNETH Sherman     ------------------------------------------------------------------------------------------    S: Ms. Agosto donated a kidney 1 week ago and is doing well, reporting no fevers, chills, or dysuria.    Last night noticed clear suture poking out of incision along with piece of dried glue.   No popping sensation or trauma. No new drainage or pain around site.  She put band-aid over site.     Home BP 120s/80.      Medications:  Prescription Medications as of 5/23/2024         Rx Number Disp Refills Start End Last Dispensed Date Next Fill Date Owning Pharmacy    acetaminophen (TYLENOL) 325 MG tablet  60 tablet 0 5/15/2024 --   [facility] - [place], [place] - [address] [facility]    Sig: Take 2 tablets (650 mg) by mouth every 6 hours as needed for mild pain or fever    Class: E-Prescribe    Route: Oral    Renewals       Renewal requests to authorizing provider (Catherine Byrd NP) <b>prohibited</b>            methocarbamol (ROBAXIN) 500 MG tablet  12 tablet 0 5/17/2024 --   [id number] - [place] - [address] AT [facility] [address]     Sig: Take 1 tablet (500 mg) by  mouth 4 times daily as needed for muscle spasms    Class: E-Prescribe    Route: Oral    methocarbamol (ROBAXIN) 500 MG tablet  8 tablet 0 5/15/2024 --   [address]    Sig: Take 1 tablet (500 mg) by mouth 4 times daily as needed for muscle spasms    Class: E-Prescribe    Route: Oral    Renewals       Renewal requests to authorizing provider (Catherine Byrd NP) <b>prohibited</b>            ondansetron (ZOFRAN ODT) 4 MG ODT tab  15 tablet 0 5/15/2024 --   [address]    Sig: Take 1 tablet (4 mg) by mouth every 8 hours as needed for nausea    Class: E-Prescribe    Route: Oral    oxyCODONE (ROXICODONE) 5 MG tablet  12 tablet 0 5/15/2024 --   [address]    Sig: Take 1 tablet (5 mg) by mouth every 6 hours as needed for severe pain or moderate to severe pain    Class: E-Prescribe    Earliest Fill Date: 5/15/2024    Route: Oral    senna-docusate (SENOKOT-S/PERICOLACE) 8.6-50 MG tablet  30 tablet 0 5/15/2024 --   [address]    Sig: Take 1 tablet by mouth 2 times daily    Class: E-Prescribe    Route: Oral    Renewals       Renewal requests to authorizing provider (Catherine Byrd NP) <b>prohibited</b>                    Exam:      Appearance: in no apparent distress.   Skin: normal  Respiratory: normal respiratory excursions, no audible wheeze  Cardiovascular: RRR  Abdomen: rounded, No distention. R abdomen lap site with band-aid. Bandage removed in clinic and surgical glue and clear suture came off with band-aid. R lap site dry and healing well. Steris applied.   Other lap sites CDI with surgical glue. No drainage noted. No surrounding erythema or induration.   Extremeties: Edema, none  Neuro: without deficit       Labs:   Admission on 05/14/2024, Discharged on 05/15/2024    Component Date Value Ref Range Status     Hold Specimen 05/14/2024 Specimen Received   Final     Hold Specimen 05/14/2024 [facility]   Final     GLUCOSE BY METER POCT 05/14/2024 90  70 - 99 mg/dL Final     WBC Count 05/14/2024 12.3 (H)  4.0 - 11.0 10e3/uL Final     RBC Count 05/14/2024 4.31  3.80 - 5.20 10e6/uL Final     Hemoglobin 05/14/2024 13.2  11.7 - 15.7 g/dL Final     Hematocrit 05/14/2024 39.6  35.0 - 47.0 % Final     MCV 05/14/2024 92  78 - 100 fL Final     MCH 05/14/2024 30.6  26.5 - 33.0 pg Final     MCHC 05/14/2024 33.3  31.5 - 36.5 g/dL Final     RDW 05/14/2024 12.8  10.0 - 15.0 % Final     Platelet Count 05/14/2024 178  150 - 450 10e3/uL Final     CK 05/14/2024 105  26 - 192 U/L Final     Hemoglobin 05/15/2024 13.3  11.7 - 15.7 g/dL Final     Hematocrit 05/15/2024 39.4  35.0 - 47.0 % Final     Urea Nitrogen 05/15/2024 10.7  6.0 - 20.0 mg/dL Final     Creatinine 05/15/2024 1.18 (H)  0.51 - 0.95 mg/dL Final     GFR Estimate 05/15/2024 55 (L)  >60 mL/min/1.73m2 Final     Color Urine 05/15/2024 Straw  Colorless, Straw, Light Yellow, Yellow Final     Appearance Urine 05/15/2024 Clear  Clear Final     Glucose Urine 05/15/2024 Negative  Negative mg/dL Final     Bilirubin Urine 05/15/2024 Negative  Negative Final     Ketones Urine 05/15/2024 Negative  Negative mg/dL Final     Specific Gravity Urine 05/15/2024 1.004  1.003 - 1.035 Final     Blood Urine 05/15/2024 Trace (A)  Negative Final     pH Urine 05/15/2024 6.5  5.0 - 7.0 Final     Protein Albumin Urine 05/15/2024 Negative  Negative mg/dL Final     Urobilinogen Urine 05/15/2024 Normal  Normal, 2.0 mg/dL Final     Nitrite Urine 05/15/2024 Negative  Negative Final     Leukocyte Esterase Urine 05/15/2024 Negative  Negative Final     RBC Urine 05/15/2024 0  <=2 /HPF Final     WBC Urine 05/15/2024 0  <=5 /HPF Final     Squamous Epithelials Urine 05/15/2024 <1  <=1 /HPF Final     Transitional Epithelials Urine 05/15/2024 <1  <=1 /HPF Final          Again, thank you  for allowing me to participate in the care of your patient.        Sincerely,        KENNETH Sherman CNP

## 2024-05-23 NOTE — NURSING NOTE
"Chief Complaint   Patient presents with    Follow Up     Kidney donor post-op       Vital signs:  Temp: 97.4  F (36.3  C) Temp src: Oral BP: (!) 153/95 Pulse: 86     SpO2: 99 %       Weight: 75.8 kg (167 lb 3.2 oz)  Estimated body mass index is 28.2 kg/m  as calculated from the following:    Height as of 5/14/24: 1.64 m (5' 4.57\").    Weight as of this encounter: 75.8 kg (167 lb 3.2 oz).      Juan Manuel Gu RN on 5/23/2024 at 12:59 PM    "

## 2024-05-23 NOTE — PROGRESS NOTES
Transplant Surgery Kidney Donor Progress Note     Medical record number: [id number]  YOB: 1970,   Date of Visit:  05/23/2024  For followup after kidney donation.    Assessment and Recommendations: Ms. Agosto is doing well after kidney donation.     S/p kidney donation 5/14/24  - R lap site with surgical glue and clear suture fell off. Site healing well. No drainage. Steris applied and reassured. Report back any changes.         Case discussed with Dr. Barker     Total time: 15 minutes  Counselling time: 10 minutes    KENNETH Sherman     ------------------------------------------------------------------------------------------    S: Ms. Agosto donated a kidney 1 week ago and is doing well, reporting no fevers, chills, or dysuria.    Last night noticed clear suture poking out of incision along with piece of dried glue.   No popping sensation or trauma. No new drainage or pain around site.  She put band-aid over site.     Home BP 120s/80.      Medications:  Prescription Medications as of 5/23/2024         Rx Number Disp Refills Start End Last Dispensed Date Next Fill Date Owning Pharmacy    acetaminophen (TYLENOL) 325 MG tablet  60 tablet 0 5/15/2024 --   [place], [place] - [address] [place] [address]    Sig: Take 2 tablets (650 mg) by mouth every 6 hours as needed for mild pain or fever    Class: E-Prescribe    Route: Oral    Renewals       Renewal requests to authorizing provider (Catherine Byrd NP) <b>prohibited</b>            methocarbamol (ROBAXIN) 500 MG tablet  12 tablet 0 5/17/2024 --   [id number] - [place] - [address] [facility] AT [facility] [address]     Sig: Take 1 tablet (500 mg) by mouth 4 times daily as needed for muscle spasms    Class: E-Prescribe    Route: Oral    methocarbamol (ROBAXIN) 500 MG tablet  8 tablet 0 5/15/2024 --   [facility] [place], [place] - [address] [facility]    Sig: Take 1 tablet (500 mg) by  mouth 4 times daily as needed for muscle spasms    Class: E-Prescribe    Route: Oral    Renewals       Renewal requests to authorizing provider (Catherine Byrd NP) <b>prohibited</b>            ondansetron (ZOFRAN ODT) 4 MG ODT tab  15 tablet 0 5/15/2024 --   [place], [place] - [address] [place] [address]    Sig: Take 1 tablet (4 mg) by mouth every 8 hours as needed for nausea    Class: E-Prescribe    Route: Oral    oxyCODONE (ROXICODONE) 5 MG tablet  12 tablet 0 5/15/2024 --   [address]    Sig: Take 1 tablet (5 mg) by mouth every 6 hours as needed for severe pain or moderate to severe pain    Class: E-Prescribe    Earliest Fill Date: 5/15/2024    Route: Oral    senna-docusate (SENOKOT-S/PERICOLACE) 8.6-50 MG tablet  30 tablet 0 5/15/2024 --   [address]    Sig: Take 1 tablet by mouth 2 times daily    Class: E-Prescribe    Route: Oral    Renewals       Renewal requests to authorizing provider (Catherine Byrd NP) <b>prohibited</b>                    Exam:      Appearance: in no apparent distress.   Skin: normal  Respiratory: normal respiratory excursions, no audible wheeze  Cardiovascular: RRR  Abdomen: rounded, No distention. R abdomen lap site with band-aid. Bandage removed in clinic and surgical glue and clear suture came off with band-aid. R lap site dry and healing well. Steris applied.   Other lap sites CDI with surgical glue. No drainage noted. No surrounding erythema or induration.   Extremeties: Edema, none  Neuro: without deficit       Labs:   Admission on 05/14/2024, Discharged on 05/15/2024   Component Date Value Ref Range Status    Hold Specimen 05/14/2024 Specimen Received   Final    Hold Specimen 05/14/2024 [facility]   Final    GLUCOSE BY METER POCT 05/14/2024 90  70 - 99 mg/dL Final    WBC Count 05/14/2024 12.3 (H)  4.0 - 11.0 10e3/uL Final    RBC Count  05/14/2024 4.31  3.80 - 5.20 10e6/uL Final    Hemoglobin 05/14/2024 13.2  11.7 - 15.7 g/dL Final    Hematocrit 05/14/2024 39.6  35.0 - 47.0 % Final    MCV 05/14/2024 92  78 - 100 fL Final    MCH 05/14/2024 30.6  26.5 - 33.0 pg Final    MCHC 05/14/2024 33.3  31.5 - 36.5 g/dL Final    RDW 05/14/2024 12.8  10.0 - 15.0 % Final    Platelet Count 05/14/2024 178  150 - 450 10e3/uL Final    CK 05/14/2024 105  26 - 192 U/L Final    Hemoglobin 05/15/2024 13.3  11.7 - 15.7 g/dL Final    Hematocrit 05/15/2024 39.4  35.0 - 47.0 % Final    Urea Nitrogen 05/15/2024 10.7  6.0 - 20.0 mg/dL Final    Creatinine 05/15/2024 1.18 (H)  0.51 - 0.95 mg/dL Final    GFR Estimate 05/15/2024 55 (L)  >60 mL/min/1.73m2 Final    Color Urine 05/15/2024 Straw  Colorless, Straw, Light Yellow, Yellow Final    Appearance Urine 05/15/2024 Clear  Clear Final    Glucose Urine 05/15/2024 Negative  Negative mg/dL Final    Bilirubin Urine 05/15/2024 Negative  Negative Final    Ketones Urine 05/15/2024 Negative  Negative mg/dL Final    Specific Gravity Urine 05/15/2024 1.004  1.003 - 1.035 Final    Blood Urine 05/15/2024 Trace (A)  Negative Final    pH Urine 05/15/2024 6.5  5.0 - 7.0 Final    Protein Albumin Urine 05/15/2024 Negative  Negative mg/dL Final    Urobilinogen Urine 05/15/2024 Normal  Normal, 2.0 mg/dL Final    Nitrite Urine 05/15/2024 Negative  Negative Final    Leukocyte Esterase Urine 05/15/2024 Negative  Negative Final    RBC Urine 05/15/2024 0  <=2 /HPF Final    WBC Urine 05/15/2024 0  <=5 /HPF Final    Squamous Epithelials Urine 05/15/2024 <1  <=1 /HPF Final    Transitional Epithelials Urine 05/15/2024 <1  <=1 /HPF Final

## 2024-05-23 NOTE — TELEPHONE ENCOUNTER
Patient called to ask for assistance scheduling a clinic visit today for wound check.  She describes a stitch sticking out and the wound is red and a little weepy.  I have notified Addis and Carly and set up 1pm visit today.  Patient is aware and her  is available to drive her.  Patient's questions answered. Patient stated understanding and is in agreement to the plan of care.   Karin Mckenna RN  Living Donor Coordinator  05/23/2024 9:57 AM

## 2024-05-28 ENCOUNTER — TELEPHONE (OUTPATIENT)
Dept: TRANSPLANT | Facility: CLINIC | Age: 54
End: 2024-05-28
Payer: COMMERCIAL

## 2024-05-28 DIAGNOSIS — Z52.4 KIDNEY DONOR: Primary | ICD-10-CM

## 2024-05-28 NOTE — TELEPHONE ENCOUNTER
Post-hospital outreach call made to check in post 2 weeks donation.     Jazzy reports overall feeling okay.    Incision: clean-  one spot she was seen in clinic for is still draining slightly, changing daily- will let us know if any changes   Pain: Jazzy is complaining of mid right quadrant pain- between mid line incision and port incision site- she states the pain worsened when moving the other day and the pain has been increasing although today its better with tylenol. Jazzy says the pain increases when walking or doing activity and it is better with rest and medication, althought the pain is always present.     Discussed with Carolyne Paula and we will have Jazzy seen in clinic tomorrow.       Bowels: normal BMs   Appetite: improving  Fluids: able to maintain hydration   Urinary: no issues urinating   Activity: up and walking some, using abd binder when walking.     Reviewed: abdominal precautions, lifting restrictions.    Next appointment: tomorrow     Reminded Jazzy about [facility] follow-up requirements.     Jazzy knows how to get in touch with me or an on call coordinator with any questions or concerns.

## 2024-05-29 ENCOUNTER — LAB (OUTPATIENT)
Dept: LAB | Facility: CLINIC | Age: 54
End: 2024-05-29
Payer: COMMERCIAL

## 2024-05-29 ENCOUNTER — OFFICE VISIT (OUTPATIENT)
Dept: TRANSPLANT | Facility: CLINIC | Age: 54
End: 2024-05-29
Attending: NURSE PRACTITIONER

## 2024-05-29 VITALS
DIASTOLIC BLOOD PRESSURE: 90 MMHG | BODY MASS INDEX: 28.2 KG/M2 | WEIGHT: 167.2 LBS | HEART RATE: 80 BPM | SYSTOLIC BLOOD PRESSURE: 153 MMHG | OXYGEN SATURATION: 100 %

## 2024-05-29 DIAGNOSIS — R10.31 ABDOMINAL PAIN, RIGHT LOWER QUADRANT: ICD-10-CM

## 2024-05-29 DIAGNOSIS — R10.31 ABDOMINAL PAIN, RIGHT LOWER QUADRANT: Primary | ICD-10-CM

## 2024-05-29 DIAGNOSIS — Z52.4 KIDNEY DONOR: ICD-10-CM

## 2024-05-29 LAB
ANION GAP SERPL CALCULATED.3IONS-SCNC: 7 MMOL/L (ref 7–15)
BASOPHILS # BLD AUTO: 0 10E3/UL (ref 0–0.2)
BASOPHILS NFR BLD AUTO: 0 %
BUN SERPL-MCNC: 16.1 MG/DL (ref 6–20)
CALCIUM SERPL-MCNC: 9 MG/DL (ref 8.6–10)
CHLORIDE SERPL-SCNC: 105 MMOL/L (ref 98–107)
CREAT SERPL-MCNC: 1.16 MG/DL (ref 0.51–0.95)
DEPRECATED HCO3 PLAS-SCNC: 28 MMOL/L (ref 22–29)
EGFRCR SERPLBLD CKD-EPI 2021: 56 ML/MIN/1.73M2
EOSINOPHIL # BLD AUTO: 0.2 10E3/UL (ref 0–0.7)
EOSINOPHIL NFR BLD AUTO: 2 %
ERYTHROCYTE [DISTWIDTH] IN BLOOD BY AUTOMATED COUNT: 12.6 % (ref 10–15)
GLUCOSE SERPL-MCNC: 99 MG/DL (ref 70–99)
HCT VFR BLD AUTO: 42.2 % (ref 35–47)
HGB BLD-MCNC: 13.7 G/DL (ref 11.7–15.7)
IMM GRANULOCYTES # BLD: 0 10E3/UL
IMM GRANULOCYTES NFR BLD: 0 %
LYMPHOCYTES # BLD AUTO: 1.4 10E3/UL (ref 0.8–5.3)
LYMPHOCYTES NFR BLD AUTO: 18 %
MCH RBC QN AUTO: 30.1 PG (ref 26.5–33)
MCHC RBC AUTO-ENTMCNC: 32.5 G/DL (ref 31.5–36.5)
MCV RBC AUTO: 93 FL (ref 78–100)
MONOCYTES # BLD AUTO: 0.5 10E3/UL (ref 0–1.3)
MONOCYTES NFR BLD AUTO: 6 %
NEUTROPHILS # BLD AUTO: 5.6 10E3/UL (ref 1.6–8.3)
NEUTROPHILS NFR BLD AUTO: 74 %
NRBC # BLD AUTO: 0 10E3/UL
NRBC BLD AUTO-RTO: 0 /100
PLATELET # BLD AUTO: 321 10E3/UL (ref 150–450)
POTASSIUM SERPL-SCNC: 4.5 MMOL/L (ref 3.4–5.3)
RBC # BLD AUTO: 4.55 10E6/UL (ref 3.8–5.2)
SODIUM SERPL-SCNC: 140 MMOL/L (ref 135–145)
WBC # BLD AUTO: 7.7 10E3/UL (ref 4–11)

## 2024-05-29 PROCEDURE — 80048 BASIC METABOLIC PNL TOTAL CA: CPT | Performed by: PATHOLOGY

## 2024-05-29 PROCEDURE — 85025 COMPLETE CBC W/AUTO DIFF WBC: CPT | Performed by: PATHOLOGY

## 2024-05-29 PROCEDURE — 99213 OFFICE O/P EST LOW 20 MIN: CPT | Performed by: NURSE PRACTITIONER

## 2024-05-29 PROCEDURE — 99024 POSTOP FOLLOW-UP VISIT: CPT | Performed by: NURSE PRACTITIONER

## 2024-05-29 PROCEDURE — 36415 COLL VENOUS BLD VENIPUNCTURE: CPT | Performed by: PATHOLOGY

## 2024-05-29 NOTE — PROGRESS NOTES
"Transplant Surgery Kidney Donor Progress Note     Medical record number: [id number]  YOB: 1970,   Date of Visit:  05/29/2024  For followup after kidney donation.    Assessment and Recommendations: Ms. Agosto is doing fairly well after kidney donation.     1. RLQ pain after kidney donation 5/14/24     -stable abdominal exam. Will get labs and KUB.     2. Incisional wound    -two openings: proximal and distal. Lightly pack proximal opening BID. Cover distal opening.  Ok to shower. Switch to wet to dry dressing packing when wound drainage has stopped. Patient verbalized understanding of wound care instructions.            -KUB scheduled same day but patient declining. States \"I'm feeling much better today. I'll wait on the xray and reach out if pain worsens.\"     Total time: 15 minutes  Counselling time: 10 minutes    Case discussed with Dr. Kaye. Mj aware.     KENNETH Sherman    ------------------------------------------------------------------------------------------    S: Ms. Agosto donated a kidney 2  weeks ago.     Developed RLQ pain 5 days ago that was progressively getting worse. Had BM and passed gas last night and this helped a lot.  Pain overall better today but still present.   Has been having stools daily.   Eating and drinking ok.   No fever or chills. No N/V.    Glue fell off of proximal incision.   More drainage from top lap site, changing dressing twice a day. Bloody appearing.   Below umbilical incision with small drainage as well and small opening.     Taking tylenol and resting which helps.   Yesterday 6/10 then down to a 3/10 after meds.     Medications:  Prescription Medications as of 5/29/2024         Rx Number Disp Refills Start End Last Dispensed Date Next Fill Date Owning Pharmacy    acetaminophen (TYLENOL) 325 MG tablet  60 tablet 0 5/15/2024 --   [facility] - [place], [place] - [address] [facility]    Sig: Take 2 tablets (650 mg) by mouth every 6 " hours as needed for mild pain or fever    Class: E-Prescribe    Route: Oral    Renewals       Renewal requests to authorizing provider (Catherine Byrd NP) <b>prohibited</b>            methocarbamol (ROBAXIN) 500 MG tablet  12 tablet 0 5/17/2024 --   [id number] - [place] - [address] [facility] AT [facility] [address]     Sig: Take 1 tablet (500 mg) by mouth 4 times daily as needed for muscle spasms    Class: E-Prescribe    Route: Oral    methocarbamol (ROBAXIN) 500 MG tablet  8 tablet 0 5/15/2024 --   [address]    Sig: Take 1 tablet (500 mg) by mouth 4 times daily as needed for muscle spasms    Class: E-Prescribe    Route: Oral    Renewals       Renewal requests to authorizing provider (Catherine Byrd NP) <b>prohibited</b>            ondansetron (ZOFRAN ODT) 4 MG ODT tab  15 tablet 0 5/15/2024 --   [address]    Sig: Take 1 tablet (4 mg) by mouth every 8 hours as needed for nausea    Class: E-Prescribe    Route: Oral    oxyCODONE (ROXICODONE) 5 MG tablet  12 tablet 0 5/15/2024 --   [address]    Sig: Take 1 tablet (5 mg) by mouth every 6 hours as needed for severe pain or moderate to severe pain    Class: E-Prescribe    Earliest Fill Date: 5/15/2024    Route: Oral    senna-docusate (SENOKOT-S/PERICOLACE) 8.6-50 MG tablet  30 tablet 0 5/15/2024 --   [address]    Sig: Take 1 tablet by mouth 2 times daily    Class: E-Prescribe    Route: Oral    Renewals       Renewal requests to authorizing provider (Catherine Byrd NP) <b>prohibited</b>                    Exam:      Appearance: in no apparent distress.   Skin: normal  Head and Neck: Normal, no rashes or jaundice  Respiratory: normal respiratory excursions, no audible wheeze  Cardiovascular:  RRR  Abdomen: flat, No distention, abd soft and no rigidity or rebound. Mild pain in RLQ with palpation but no rigidity or rebound.   Proximal incision with 0.5cm opening with slight sero sang drainage. No tunneling. Fascia intact. Mild surrounding pink skin. No induration, purulence or erythema. Wound packed with 4x4 gauze edge and covered.   Distal incision with 3mm opening. No drainage, erythema or induration.  Site covered with gauze.   Extremeties: Edema, none  Neuro: without deficit       Labs:   Admission on 05/14/2024, Discharged on 05/15/2024   Component Date Value Ref Range Status    Hold Specimen 05/14/2024 Specimen Received   Final    Hold Specimen 05/14/2024 [facility]   Final    GLUCOSE BY METER POCT 05/14/2024 90  70 - 99 mg/dL Final    WBC Count 05/14/2024 12.3 (H)  4.0 - 11.0 10e3/uL Final    RBC Count 05/14/2024 4.31  3.80 - 5.20 10e6/uL Final    Hemoglobin 05/14/2024 13.2  11.7 - 15.7 g/dL Final    Hematocrit 05/14/2024 39.6  35.0 - 47.0 % Final    MCV 05/14/2024 92  78 - 100 fL Final    MCH 05/14/2024 30.6  26.5 - 33.0 pg Final    MCHC 05/14/2024 33.3  31.5 - 36.5 g/dL Final    RDW 05/14/2024 12.8  10.0 - 15.0 % Final    Platelet Count 05/14/2024 178  150 - 450 10e3/uL Final    CK 05/14/2024 105  26 - 192 U/L Final    Hemoglobin 05/15/2024 13.3  11.7 - 15.7 g/dL Final    Hematocrit 05/15/2024 39.4  35.0 - 47.0 % Final    Urea Nitrogen 05/15/2024 10.7  6.0 - 20.0 mg/dL Final    Creatinine 05/15/2024 1.18 (H)  0.51 - 0.95 mg/dL Final    GFR Estimate 05/15/2024 55 (L)  >60 mL/min/1.73m2 Final    Color Urine 05/15/2024 Straw  Colorless, Straw, Light Yellow, Yellow Final    Appearance Urine 05/15/2024 Clear  Clear Final    Glucose Urine 05/15/2024 Negative  Negative mg/dL Final    Bilirubin Urine 05/15/2024 Negative  Negative Final    Ketones Urine 05/15/2024 Negative  Negative mg/dL Final    Specific Gravity Urine 05/15/2024 1.004  1.003 - 1.035 Final    Blood Urine 05/15/2024 Trace (A)  Negative Final     pH Urine 05/15/2024 6.5  5.0 - 7.0 Final    Protein Albumin Urine 05/15/2024 Negative  Negative mg/dL Final    Urobilinogen Urine 05/15/2024 Normal  Normal, 2.0 mg/dL Final    Nitrite Urine 05/15/2024 Negative  Negative Final    Leukocyte Esterase Urine 05/15/2024 Negative  Negative Final    RBC Urine 05/15/2024 0  <=2 /HPF Final    WBC Urine 05/15/2024 0  <=5 /HPF Final    Squamous Epithelials Urine 05/15/2024 <1  <=1 /HPF Final    Transitional Epithelials Urine 05/15/2024 <1  <=1 /HPF Final

## 2024-05-29 NOTE — LETTER
"    5/29/2024         RE: Jazzy Agosto  [address] [facility] [address]        Dear Colleague,    Thank you for referring your patient, Jazzy Agosto, to the [facility]ANSPL[facility]. Please see a copy of my visit note below.    Transplant Surgery Kidney Donor Progress Note     Medical record number: [id number]  YOB: 1970,   Date of Visit:  05/29/2024  For followup after kidney donation.    Assessment and Recommendations: Ms. Agosto is doing fairly well after kidney donation.     1. RLQ pain after kidney donation 5/14/24     -stable abdominal exam. Will get labs and KUB.     2. Incisional wound    -two openings: proximal and distal. Lightly pack proximal opening BID. Cover distal opening.  Ok to shower. Switch to wet to dry dressing packing when wound drainage has stopped. Patient verbalized understanding of wound care instructions.            -KUB scheduled same day but patient declining. States \"I'm feeling much better today. I'll wait on the xray and reach out if pain worsens.\"     Total time: 15 minutes  Counselling time: 10 minutes    Case discussed with Dr. Kaye. Mj aware.     KENNETH Sherman    ------------------------------------------------------------------------------------------    S: Ms. Agosto donated a kidney 2  weeks ago.     Developed RLQ pain 5 days ago that was progressively getting worse. Had BM and passed gas last night and this helped a lot.  Pain overall better today but still present.   Has been having stools daily.   Eating and drinking ok.   No fever or chills. No N/V.    Glue fell off of proximal incision.   More drainage from top lap site, changing dressing twice a day. Bloody appearing.   Below umbilical incision with small drainage as well and small opening.     Taking tylenol and resting which helps.   Yesterday 6/10 then down to a 3/10 after meds.     Medications:  Prescription Medications as of 5/29/2024         Rx Number Disp " Refills Start End Last Dispensed Date Next Fill Date Owning Pharmacy    acetaminophen (TYLENOL) 325 MG tablet  60 tablet 0 5/15/2024 --   [address]    Sig: Take 2 tablets (650 mg) by mouth every 6 hours as needed for mild pain or fever    Class: E-Prescribe    Route: Oral    Renewals       Renewal requests to authorizing provider (Catherine Byrd, NP) <b>prohibited</b>            methocarbamol (ROBAXIN) 500 MG tablet  12 tablet 0 5/17/2024 --   [id number] - [place] - [address] [facility] AT [facility] [address]     Sig: Take 1 tablet (500 mg) by mouth 4 times daily as needed for muscle spasms    Class: E-Prescribe    Route: Oral    methocarbamol (ROBAXIN) 500 MG tablet  8 tablet 0 5/15/2024 --   [address]    Sig: Take 1 tablet (500 mg) by mouth 4 times daily as needed for muscle spasms    Class: E-Prescribe    Route: Oral    Renewals       Renewal requests to authorizing provider (Catherine Byrd, NP) <b>prohibited</b>            ondansetron (ZOFRAN ODT) 4 MG ODT tab  15 tablet 0 5/15/2024 --   [address]    Sig: Take 1 tablet (4 mg) by mouth every 8 hours as needed for nausea    Class: E-Prescribe    Route: Oral    oxyCODONE (ROXICODONE) 5 MG tablet  12 tablet 0 5/15/2024 --   [address]    Sig: Take 1 tablet (5 mg) by mouth every 6 hours as needed for severe pain or moderate to severe pain    Class: E-Prescribe    Earliest Fill Date: 5/15/2024    Route: Oral    senna-docusate (SENOKOT-S/PERICOLACE) 8.6-50 MG tablet  30 tablet 0 5/15/2024 --   [address]    Sig: Take 1 tablet by mouth 2 times daily    Class: E-Prescribe    Route: Oral    Renewals       Renewal requests to authorizing provider  (Catherine Byrd, SRIDEVI) <b>prohibited</b>                    Exam:      Appearance: in no apparent distress.   Skin: normal  Head and Neck: Normal, no rashes or jaundice  Respiratory: normal respiratory excursions, no audible wheeze  Cardiovascular: RRR  Abdomen: flat, No distention, abd soft and no rigidity or rebound. Mild pain in RLQ with palpation but no rigidity or rebound.   Proximal incision with 0.5cm opening with slight sero sang drainage. No tunneling. Fascia intact. Mild surrounding pink skin. No induration, purulence or erythema. Wound packed with 4x4 gauze edge and covered.   Distal incision with 3mm opening. No drainage, erythema or induration.  Site covered with gauze.   Extremeties: Edema, none  Neuro: without deficit       Labs:   Admission on 05/14/2024, Discharged on 05/15/2024   Component Date Value Ref Range Status     Hold Specimen 05/14/2024 Specimen Received   Final     Hold Specimen 05/14/2024 [facility]   Final     GLUCOSE BY METER POCT 05/14/2024 90  70 - 99 mg/dL Final     WBC Count 05/14/2024 12.3 (H)  4.0 - 11.0 10e3/uL Final     RBC Count 05/14/2024 4.31  3.80 - 5.20 10e6/uL Final     Hemoglobin 05/14/2024 13.2  11.7 - 15.7 g/dL Final     Hematocrit 05/14/2024 39.6  35.0 - 47.0 % Final     MCV 05/14/2024 92  78 - 100 fL Final     MCH 05/14/2024 30.6  26.5 - 33.0 pg Final     MCHC 05/14/2024 33.3  31.5 - 36.5 g/dL Final     RDW 05/14/2024 12.8  10.0 - 15.0 % Final     Platelet Count 05/14/2024 178  150 - 450 10e3/uL Final     CK 05/14/2024 105  26 - 192 U/L Final     Hemoglobin 05/15/2024 13.3  11.7 - 15.7 g/dL Final     Hematocrit 05/15/2024 39.4  35.0 - 47.0 % Final     Urea Nitrogen 05/15/2024 10.7  6.0 - 20.0 mg/dL Final     Creatinine 05/15/2024 1.18 (H)  0.51 - 0.95 mg/dL Final     GFR Estimate 05/15/2024 55 (L)  >60 mL/min/1.73m2 Final     Color Urine 05/15/2024 Straw  Colorless, Straw, Light Yellow, Yellow Final     Appearance Urine 05/15/2024 Clear  Clear Final     Glucose Urine  05/15/2024 Negative  Negative mg/dL Final     Bilirubin Urine 05/15/2024 Negative  Negative Final     Ketones Urine 05/15/2024 Negative  Negative mg/dL Final     Specific Gravity Urine 05/15/2024 1.004  1.003 - 1.035 Final     Blood Urine 05/15/2024 Trace (A)  Negative Final     pH Urine 05/15/2024 6.5  5.0 - 7.0 Final     Protein Albumin Urine 05/15/2024 Negative  Negative mg/dL Final     Urobilinogen Urine 05/15/2024 Normal  Normal, 2.0 mg/dL Final     Nitrite Urine 05/15/2024 Negative  Negative Final     Leukocyte Esterase Urine 05/15/2024 Negative  Negative Final     RBC Urine 05/15/2024 0  <=2 /HPF Final     WBC Urine 05/15/2024 0  <=5 /HPF Final     Squamous Epithelials Urine 05/15/2024 <1  <=1 /HPF Final     Transitional Epithelials Urine 05/15/2024 <1  <=1 /HPF Final          Again, thank you for allowing me to participate in the care of your patient.        Sincerely,        KENNETH Sherman CNP

## 2024-05-31 ENCOUNTER — TELEPHONE (OUTPATIENT)
Dept: TRANSPLANT | Facility: CLINIC | Age: 54
End: 2024-05-31
Payer: COMMERCIAL

## 2024-06-03 ENCOUNTER — OFFICE VISIT (OUTPATIENT)
Dept: TRANSPLANT | Facility: CLINIC | Age: 54
End: 2024-06-03
Attending: SURGERY

## 2024-06-03 DIAGNOSIS — Z52.4 KIDNEY DONOR: ICD-10-CM

## 2024-06-03 PROCEDURE — 99024 POSTOP FOLLOW-UP VISIT: CPT | Mod: GC | Performed by: SURGERY

## 2024-06-03 PROCEDURE — 99213 OFFICE O/P EST LOW 20 MIN: CPT | Performed by: SURGERY

## 2024-06-03 NOTE — PROGRESS NOTES
3 weeks post op from right donor nephrectomy (hand assist).    The epigastric port site has opened and she is packing. Overall, doing well though.     On exam, the epigastric port site has open skin with a clean wound base. The remainder of the incisions are healing quite well.     Plan  Follow up in 3 weeks.   Continue packing epigastric wound.  Will re-assess to return to work in 3 weeks.     LUCERO Kaye MD    I have reviewed history, examined patient and discussed plan with the fellow/resident/TIMOTEO.  I concur with the findings in this note.    Patient was counseled on complications of incision and short and long term care to minimize infection/hernia risk.    Total time: 20 min  Including pre-encounter, face to face and post-encounter time spent on this patient's visit.

## 2024-06-03 NOTE — LETTER
6/3/2024         RE: Jazzy Agosto  [address] [address] [facility] [address]        Dear Colleague,    Thank you for referring your patient, Jazzy Agosto, to the [facility]PL[facility]. Please see a copy of my visit note below.    3 weeks post op from right donor nephrectomy (hand assist).    The epigastric port site has opened and she is packing. Overall, doing well though.     On exam, the epigastric port site has open skin with a clean wound base. The remainder of the incisions are healing quite well.     Plan  Follow up in 3 weeks.   Continue packing epigastric wound.  Will re-assess to return to work in 3 weeks.     LUCERO Kaye MD    I have reviewed history, examined patient and discussed plan with the fellow/resident/TIMOTEO.  I concur with the findings in this note.    Patient was counseled on complications of incision and short and long term care to minimize infection/hernia risk.    Total time: 20 min  Including pre-encounter, face to face and post-encounter time spent on this patient's visit.                 Again, thank you for allowing me to participate in the care of your patient.        Sincerely,        Kelly Barker MD

## 2024-06-04 ENCOUNTER — TELEPHONE (OUTPATIENT)
Dept: TRANSPLANT | Facility: CLINIC | Age: 54
End: 2024-06-04
Payer: COMMERCIAL

## 2024-06-04 DIAGNOSIS — Z52.4 KIDNEY DONOR: Primary | ICD-10-CM

## 2024-06-04 NOTE — TELEPHONE ENCOUNTER
Post-hospital outreach call made to check in post 3 weeks donation.     Jazzy reports overall feeling okay.    Incision:incision requiring packing, patient states she feels it is slowly improving- follow up appt made for three weeks per Dr Barker  Pain: controlled with prn tylenol  Bowels: Bms daily   Appetite: appetite is fair, continuing small frequent meals encouraged  Fluids: able to maintain hydration  Urinary: no issues urinating   Activity: up and walking without difficulty, using abd binder when walking.     Reviewed: abdominal precautions, lifting restrictions.    Next appointment: 06/24    Reminded Jazzy about [facility]OS follow-up requirements.     Jazzy knows how to get in touch with me or an on call coordinator with any questions or concerns.

## 2024-06-10 ENCOUNTER — LAB (OUTPATIENT)
Dept: LAB | Facility: CLINIC | Age: 54
End: 2024-06-10

## 2024-06-10 ENCOUNTER — ANCILLARY PROCEDURE (OUTPATIENT)
Dept: CT IMAGING | Facility: CLINIC | Age: 54
End: 2024-06-10
Attending: SURGERY

## 2024-06-10 ENCOUNTER — TELEPHONE (OUTPATIENT)
Dept: TRANSPLANT | Facility: CLINIC | Age: 54
End: 2024-06-10
Payer: COMMERCIAL

## 2024-06-10 ENCOUNTER — OFFICE VISIT (OUTPATIENT)
Dept: TRANSPLANT | Facility: CLINIC | Age: 54
End: 2024-06-10
Attending: SURGERY

## 2024-06-10 ENCOUNTER — APPOINTMENT (OUTPATIENT)
Dept: LAB | Facility: CLINIC | Age: 54
End: 2024-06-10
Payer: COMMERCIAL

## 2024-06-10 VITALS
OXYGEN SATURATION: 99 % | WEIGHT: 163 LBS | DIASTOLIC BLOOD PRESSURE: 99 MMHG | BODY MASS INDEX: 27.49 KG/M2 | SYSTOLIC BLOOD PRESSURE: 141 MMHG | TEMPERATURE: 98.2 F | HEART RATE: 87 BPM

## 2024-06-10 DIAGNOSIS — Z52.4 KIDNEY DONOR: Primary | ICD-10-CM

## 2024-06-10 DIAGNOSIS — Z52.4 KIDNEY DONOR: ICD-10-CM

## 2024-06-10 LAB
ALBUMIN SERPL BCG-MCNC: 4.2 G/DL (ref 3.5–5.2)
ALBUMIN SERPL BCG-MCNC: 4.2 G/DL (ref 3.5–5.2)
ALBUMIN UR-MCNC: NEGATIVE MG/DL
ALP SERPL-CCNC: 260 U/L (ref 40–150)
ALP SERPL-CCNC: 260 U/L (ref 40–150)
ALT SERPL W P-5'-P-CCNC: 546 U/L (ref 0–50)
ALT SERPL W P-5'-P-CCNC: 546 U/L (ref 0–50)
ANION GAP SERPL CALCULATED.3IONS-SCNC: 10 MMOL/L (ref 7–15)
APPEARANCE UR: CLEAR
AST SERPL W P-5'-P-CCNC: 201 U/L (ref 0–45)
AST SERPL W P-5'-P-CCNC: 201 U/L (ref 0–45)
BASOPHILS # BLD AUTO: 0 10E3/UL (ref 0–0.2)
BASOPHILS NFR BLD AUTO: 0 %
BILIRUB DIRECT SERPL-MCNC: <0.2 MG/DL (ref 0–0.3)
BILIRUB SERPL-MCNC: 0.4 MG/DL
BILIRUB SERPL-MCNC: 0.4 MG/DL
BILIRUB UR QL STRIP: NEGATIVE
BUN SERPL-MCNC: 17 MG/DL (ref 6–20)
CALCIUM SERPL-MCNC: 9.6 MG/DL (ref 8.6–10)
CHLORIDE SERPL-SCNC: 101 MMOL/L (ref 98–107)
COLOR UR AUTO: ABNORMAL
CREAT SERPL-MCNC: 1.17 MG/DL (ref 0.51–0.95)
DEPRECATED HCO3 PLAS-SCNC: 28 MMOL/L (ref 22–29)
EGFRCR SERPLBLD CKD-EPI 2021: 56 ML/MIN/1.73M2
EOSINOPHIL # BLD AUTO: 0.5 10E3/UL (ref 0–0.7)
EOSINOPHIL NFR BLD AUTO: 6 %
ERYTHROCYTE [DISTWIDTH] IN BLOOD BY AUTOMATED COUNT: 12.6 % (ref 10–15)
GLUCOSE SERPL-MCNC: 90 MG/DL (ref 70–99)
GLUCOSE UR STRIP-MCNC: NEGATIVE MG/DL
HCT VFR BLD AUTO: 42.1 % (ref 35–47)
HGB BLD-MCNC: 13.7 G/DL (ref 11.7–15.7)
HGB UR QL STRIP: ABNORMAL
IMM GRANULOCYTES # BLD: 0 10E3/UL
IMM GRANULOCYTES NFR BLD: 0 %
KETONES UR STRIP-MCNC: NEGATIVE MG/DL
LEUKOCYTE ESTERASE UR QL STRIP: NEGATIVE
LYMPHOCYTES # BLD AUTO: 1.3 10E3/UL (ref 0.8–5.3)
LYMPHOCYTES NFR BLD AUTO: 16 %
MCH RBC QN AUTO: 29.9 PG (ref 26.5–33)
MCHC RBC AUTO-ENTMCNC: 32.5 G/DL (ref 31.5–36.5)
MCV RBC AUTO: 92 FL (ref 78–100)
MONOCYTES # BLD AUTO: 0.7 10E3/UL (ref 0–1.3)
MONOCYTES NFR BLD AUTO: 8 %
MUCOUS THREADS #/AREA URNS LPF: PRESENT /LPF
NEUTROPHILS # BLD AUTO: 5.8 10E3/UL (ref 1.6–8.3)
NEUTROPHILS NFR BLD AUTO: 70 %
NITRATE UR QL: NEGATIVE
NRBC # BLD AUTO: 0 10E3/UL
NRBC BLD AUTO-RTO: 0 /100
PH UR STRIP: 5.5 [PH] (ref 5–7)
PLATELET # BLD AUTO: 295 10E3/UL (ref 150–450)
POTASSIUM SERPL-SCNC: 4.1 MMOL/L (ref 3.4–5.3)
PROT SERPL-MCNC: 7.9 G/DL (ref 6.4–8.3)
PROT SERPL-MCNC: 7.9 G/DL (ref 6.4–8.3)
RBC # BLD AUTO: 4.58 10E6/UL (ref 3.8–5.2)
RBC URINE: 3 /HPF
SODIUM SERPL-SCNC: 139 MMOL/L (ref 135–145)
SP GR UR STRIP: 1.01 (ref 1–1.03)
SQUAMOUS EPITHELIAL: 2 /HPF
UROBILINOGEN UR STRIP-MCNC: NORMAL MG/DL
WBC # BLD AUTO: 8.3 10E3/UL (ref 4–11)
WBC URINE: 2 /HPF

## 2024-06-10 PROCEDURE — 74176 CT ABD & PELVIS W/O CONTRAST: CPT | Performed by: RADIOLOGY

## 2024-06-10 PROCEDURE — 85025 COMPLETE CBC W/AUTO DIFF WBC: CPT | Performed by: PATHOLOGY

## 2024-06-10 PROCEDURE — 99213 OFFICE O/P EST LOW 20 MIN: CPT

## 2024-06-10 PROCEDURE — 99024 POSTOP FOLLOW-UP VISIT: CPT | Performed by: SURGERY

## 2024-06-10 PROCEDURE — 36415 COLL VENOUS BLD VENIPUNCTURE: CPT | Performed by: PATHOLOGY

## 2024-06-10 PROCEDURE — 87205 SMEAR GRAM STAIN: CPT

## 2024-06-10 PROCEDURE — 87186 SC STD MICRODIL/AGAR DIL: CPT

## 2024-06-10 PROCEDURE — 81001 URINALYSIS AUTO W/SCOPE: CPT | Performed by: PATHOLOGY

## 2024-06-10 PROCEDURE — 80053 COMPREHEN METABOLIC PANEL: CPT | Performed by: PATHOLOGY

## 2024-06-10 PROCEDURE — 82248 BILIRUBIN DIRECT: CPT | Performed by: PATHOLOGY

## 2024-06-10 RX ORDER — SULFAMETHOXAZOLE AND TRIMETHOPRIM 400; 80 MG/1; MG/1
1 TABLET ORAL 2 TIMES DAILY
Qty: 14 TABLET | Refills: 0 | Status: ON HOLD | OUTPATIENT
Start: 2024-06-10 | End: 2024-08-07

## 2024-06-10 RX ORDER — OXYCODONE HYDROCHLORIDE 5 MG/1
5 TABLET ORAL EVERY 6 HOURS PRN
Qty: 10 TABLET | Refills: 0 | Status: SHIPPED | OUTPATIENT
Start: 2024-06-10 | End: 2024-06-13

## 2024-06-10 ASSESSMENT — PAIN SCALES - GENERAL: PAINLEVEL: SEVERE PAIN (6)

## 2024-06-10 NOTE — PROGRESS NOTES
Pt seen today in clinic for having a fever last night. CT scan done today prior to clinic and showed small amount of fluid at one of the right sided port incision. Small amount of fluid was expressed on exam so the incision was opened and swabbed, culture sent. Wound was packed with iodoform. Abx prescribed for 7 days. Follow up again next week.

## 2024-06-10 NOTE — TELEPHONE ENCOUNTER
Post-hospital outreach call made to check in post 4 weeks donation.     Jazzy reports overall feeling unwell.    Incision:incision is improving over the last week with packin  Pain: right lower flank pain, worsening over the weekend- rates it 6 or 7 without tylenol-- she also has a fever of 100.5 and general feeling of unwell  Bowels: abdomen distended, did have several bms over the weekend with help of medication   Appetite: poor- not much of appetite yet   Fluids: able to maintain hydration  Urinary: no issues urinating   Activity: up and walking , using abd binder when walking.     Reviewed: abdominal precautions, lifting restrictions.    Next appointment: to be seen in clinic today for fellow visit, labs, ct scan    Jazzy verbalized understanding and is in agreement with plan.

## 2024-06-10 NOTE — LETTER
6/10/2024      aJzzy Agosto  [address] [facility] [address]      Dear Colleague,    Thank you for referring your patient, Jazzy Agosto, to the [facility] TR[facility]PL[facility]. Please see a copy of my visit note below.    Pt seen today in clinic for having a fever last night. CT scan done today prior to clinic and showed small amount of fluid at one of the right sided port incision. Small amount of fluid was expressed on exam so the incision was opened and swabbed, culture sent. Wound was packed with iodoform. Abx prescribed for 7 days. Follow up again next week.       Again, thank you for allowing me to participate in the care of your patient.        Sincerely,         SOT SURG FELLOW 1

## 2024-06-11 ENCOUNTER — ANCILLARY PROCEDURE (OUTPATIENT)
Dept: ULTRASOUND IMAGING | Facility: CLINIC | Age: 54
End: 2024-06-11
Attending: SURGERY
Payer: COMMERCIAL

## 2024-06-11 ENCOUNTER — LAB (OUTPATIENT)
Dept: LAB | Facility: CLINIC | Age: 54
End: 2024-06-11
Payer: COMMERCIAL

## 2024-06-11 DIAGNOSIS — Z52.4 KIDNEY DONOR: Primary | ICD-10-CM

## 2024-06-11 DIAGNOSIS — Z52.4 KIDNEY DONOR: ICD-10-CM

## 2024-06-11 LAB
ALBUMIN SERPL BCG-MCNC: 4.2 G/DL (ref 3.5–5.2)
ALP SERPL-CCNC: 249 U/L (ref 40–150)
ALT SERPL W P-5'-P-CCNC: 370 U/L (ref 0–50)
AST SERPL W P-5'-P-CCNC: 82 U/L (ref 0–45)
BILIRUB DIRECT SERPL-MCNC: <0.2 MG/DL (ref 0–0.3)
BILIRUB SERPL-MCNC: 0.3 MG/DL
PROT SERPL-MCNC: 7.9 G/DL (ref 6.4–8.3)

## 2024-06-11 PROCEDURE — 76705 ECHO EXAM OF ABDOMEN: CPT | Mod: GC | Performed by: STUDENT IN AN ORGANIZED HEALTH CARE EDUCATION/TRAINING PROGRAM

## 2024-06-11 PROCEDURE — 36415 COLL VENOUS BLD VENIPUNCTURE: CPT | Performed by: PATHOLOGY

## 2024-06-11 PROCEDURE — 80076 HEPATIC FUNCTION PANEL: CPT | Performed by: PATHOLOGY

## 2024-06-12 ENCOUNTER — TELEPHONE (OUTPATIENT)
Dept: TRANSPLANT | Facility: CLINIC | Age: 54
End: 2024-06-12
Payer: COMMERCIAL

## 2024-06-13 ENCOUNTER — TELEPHONE (OUTPATIENT)
Dept: TRANSPLANT | Facility: CLINIC | Age: 54
End: 2024-06-13
Payer: COMMERCIAL

## 2024-06-13 LAB
BACTERIA ABSC ANAEROBE+AEROBE CULT: ABNORMAL
BACTERIA ABSC ANAEROBE+AEROBE CULT: ABNORMAL
GRAM STAIN RESULT: ABNORMAL
GRAM STAIN RESULT: ABNORMAL

## 2024-06-13 NOTE — TELEPHONE ENCOUNTER
Post-hospital outreach call made to check in post 4 weeks donation.     Jazzy reports overall feeling okay. No fever since last night, pain has improved slightly today.    Incision:stilling packing incision wound- had to change dressing 4 times yesterday   Pain: right lower quadrant pain- sometimes goes up into right upper quadrant- pain is slightly improved today- oxy as needed  Bowels: BM two days again, small, yellow in color-- per Carolyne Paula instructed Jazzy to use miralax and Gatorade to do produce BM today   Appetite: poor- discussed continuing small frequent meals and protein until appetite improves  Fluids: able to maintain hydration  Urinary: no issues urinating   Activity: up and walking, using abd binder when walking.     Reviewed: abdominal precautions, lifting restrictions.    Next appointment: 06/17- surgeon follow up    Reminded Jazzy about UNOS follow-up requirements.     Jazzy knows how to get in touch with me or an on call coordinator with any questions or concerns.

## 2024-06-17 ENCOUNTER — OFFICE VISIT (OUTPATIENT)
Dept: TRANSPLANT | Facility: CLINIC | Age: 54
End: 2024-06-17
Attending: SURGERY
Payer: COMMERCIAL

## 2024-06-17 ENCOUNTER — LAB (OUTPATIENT)
Dept: LAB | Facility: CLINIC | Age: 54
End: 2024-06-17
Payer: COMMERCIAL

## 2024-06-17 VITALS
OXYGEN SATURATION: 99 % | BODY MASS INDEX: 27.57 KG/M2 | TEMPERATURE: 98.1 F | HEART RATE: 91 BPM | DIASTOLIC BLOOD PRESSURE: 86 MMHG | WEIGHT: 163.5 LBS | SYSTOLIC BLOOD PRESSURE: 155 MMHG

## 2024-06-17 DIAGNOSIS — R79.89 ELEVATED LFTS: Primary | ICD-10-CM

## 2024-06-17 DIAGNOSIS — K65.1 ABSCESS OF PERITONEUM (H): ICD-10-CM

## 2024-06-17 DIAGNOSIS — Z52.4 KIDNEY DONOR: ICD-10-CM

## 2024-06-17 DIAGNOSIS — R79.89 ELEVATED LFTS: ICD-10-CM

## 2024-06-17 LAB
ALBUMIN SERPL BCG-MCNC: 4 G/DL (ref 3.5–5.2)
ALP SERPL-CCNC: 172 U/L (ref 40–150)
ALT SERPL W P-5'-P-CCNC: 73 U/L (ref 0–50)
AST SERPL W P-5'-P-CCNC: 19 U/L (ref 0–45)
BILIRUB DIRECT SERPL-MCNC: <0.2 MG/DL (ref 0–0.3)
BILIRUB SERPL-MCNC: <0.2 MG/DL
CREAT SERPL-MCNC: 1.51 MG/DL (ref 0.51–0.95)
EGFRCR SERPLBLD CKD-EPI 2021: 41 ML/MIN/1.73M2
PROT SERPL-MCNC: 7.3 G/DL (ref 6.4–8.3)

## 2024-06-17 PROCEDURE — 80076 HEPATIC FUNCTION PANEL: CPT | Performed by: PATHOLOGY

## 2024-06-17 PROCEDURE — 82565 ASSAY OF CREATININE: CPT | Performed by: PATHOLOGY

## 2024-06-17 PROCEDURE — 99024 POSTOP FOLLOW-UP VISIT: CPT | Performed by: SURGERY

## 2024-06-17 PROCEDURE — 36415 COLL VENOUS BLD VENIPUNCTURE: CPT | Performed by: PATHOLOGY

## 2024-06-17 NOTE — LETTER
6/17/2024      Jazzy Agosto  [address] [facility] [address]      Dear Colleague,    Thank you for referring your patient, Jazzy Agosto, to the [facility] TRANSPLANT [facility]. Please see a copy of my visit note below.    Transplant Surgery Kidney Donor Progress Note    Medical record number: [id number]  YOB: 1970,   Date of Visit:  06/17/2024  For followup after kidney donation.    Assessment and Recommendations: Ms. Agosto is doing okay after kidney donation.     1. Lifting restrictions: 10 lbs until 6 weeks post donation.  2. Repeat LFT's given elevation last week.    Zan Dean MD      ---------------------------------------------------------------------------------------------------    S: Ms. Agosto donate a kidney 6 weeks ago and is doing okay. She was previously seen for laparoscopic  site wounds that had opened/been opened in clinic. Cultures grew staph from last week. Was on bactrim for 1 week. Complains of ongoing low grade fevers max at 99.8. Otherwise feels fatigued, but no other complaints. Wounds appear clean.       Medications:  Prescription Medications as of 6/17/2024         Rx Number Disp Refills Start End Last Dispensed Date Next Fill Date Owning Pharmacy    acetaminophen (TYLENOL) 325 MG tablet  60 tablet 0 5/15/2024 --   [facility] - [place], [place] - [address] [facility]    Sig: Take 2 tablets (650 mg) by mouth every 6 hours as needed for mild pain or fever    Class: E-Prescribe    Route: Oral    Renewals       Renewal requests to authorizing provider (Catherine Byrd NP) <b>prohibited</b>            methocarbamol (ROBAXIN) 500 MG tablet  12 tablet 0 5/17/2024 --   [id number] - [place] - [address] [facility] AT [facility] [address]     Sig: Take 1 tablet (500 mg) by mouth 4 times daily as needed for muscle spasms    Class: E-Prescribe    Route: Oral    methocarbamol (ROBAXIN) 500 MG tablet  8 tablet 0 5/15/2024 --    [address]    Sig: Take 1 tablet (500 mg) by mouth 4 times daily as needed for muscle spasms    Class: E-Prescribe    Route: Oral    Renewals       Renewal requests to authorizing provider (Catherine Byrd NP) <b>prohibited</b>            ondansetron (ZOFRAN ODT) 4 MG ODT tab  15 tablet 0 5/15/2024 --   [address]    Sig: Take 1 tablet (4 mg) by mouth every 8 hours as needed for nausea    Class: E-Prescribe    Route: Oral    oxyCODONE (ROXICODONE) 5 MG tablet  12 tablet 0 5/15/2024 --   [address]    Sig: Take 1 tablet (5 mg) by mouth every 6 hours as needed for severe pain or moderate to severe pain    Class: E-Prescribe    Earliest Fill Date: 5/15/2024    Route: Oral    senna-docusate (SENOKOT-S/PERICOLACE) 8.6-50 MG tablet  30 tablet 0 5/15/2024 --   [address]    Sig: Take 1 tablet by mouth 2 times daily    Class: E-Prescribe    Route: Oral    Renewals       Renewal requests to authorizing provider (Catherine Byrd NP) <b>prohibited</b>            sulfamethoxazole-trimethoprim (BACTRIM) 400-80 MG tablet  14 tablet 0 6/10/2024 --       Sig: Take 1 tablet by mouth 2 times daily    Class: Local Print    Route: Oral            Exam:   Temp:  [98.1  F (36.7  C)] 98.1  F (36.7  C)  Pulse:  [91] 91  BP: (155)/(86) 155/86  SpO2:  [99 %] 99 %  Appearance: in no apparent distress.   Skin: normal, open wounds at epigastric and midclavicular right abdomen, nontender, nonerythematous, no drainage.   Respiratory: normal respiratory excursions  Cardiovascular: Regular rate  Abdomen: soft, nontender, nonerythemetous open wound  Extremeties: Edema, none  Neuro: without deficit       Labs:   Recent Labs   Lab Test 06/10/24  1358   BUN 17.0   CR 1.17*   GFRESTIMATED 56*   GLC 90      Recent Labs   Lab Test 06/10/24  1415   COLOR Light Yellow   APPEARANCE Clear   URINEGLC Negative   URINEBILI Negative   URINEKETONE Negative   SG 1.008   UBLD Small*   URINEPH 5.5   PROTEIN Negative   NITRITE Negative   LEUKEST Negative   RBCU 3*   WBCU 2     No lab results found.      Again, thank you for allowing me to participate in the care of your patient.        Sincerely,         [facility] SURG FELLOW 1

## 2024-06-17 NOTE — PROGRESS NOTES
Transplant Surgery Kidney Donor Progress Note    Medical record number: [id number]  YOB: 1970,   Date of Visit:  06/17/2024  For followup after kidney donation.    Assessment and Recommendations: Ms. Agosto is doing okay after kidney donation.     1. Lifting restrictions: 10 lbs until 6 weeks post donation.  2. Repeat LFT's given elevation last week.    Zan Dean MD      ---------------------------------------------------------------------------------------------------    S: Ms. Agosto donate a kidney 6 weeks ago and is doing okay. She was previously seen for laparoscopic  site wounds that had opened/been opened in clinic. Cultures grew staph from last week. Was on bactrim for 1 week. Complains of ongoing low grade fevers max at 99.8. Otherwise feels fatigued, but no other complaints. Wounds appear clean.       Medications:  Prescription Medications as of 6/17/2024         Rx Number Disp Refills Start End Last Dispensed Date Next Fill Date Owning Pharmacy    acetaminophen (TYLENOL) 325 MG tablet  60 tablet 0 5/15/2024 --   [address]    Sig: Take 2 tablets (650 mg) by mouth every 6 hours as needed for mild pain or fever    Class: E-Prescribe    Route: Oral    Renewals       Renewal requests to authorizing provider (Catherine Byrd NP) <b>prohibited</b>            methocarbamol (ROBAXIN) 500 MG tablet  12 tablet 0 5/17/2024 --   [id number] - [place] - [address] [facility] AT [facility] [address]     Sig: Take 1 tablet (500 mg) by mouth 4 times daily as needed for muscle spasms    Class: E-Prescribe    Route: Oral    methocarbamol (ROBAXIN) 500 MG tablet  8 tablet 0 5/15/2024 --   [address]    Sig: Take 1 tablet (500 mg) by mouth 4 times daily as needed for muscle spasms    Class: E-Prescribe    Route: Oral    Renewals       Renewal requests to authorizing  provider (Catherine Byrd NP) <b>prohibited</b>            ondansetron (ZOFRAN ODT) 4 MG ODT tab  15 tablet 0 5/15/2024 --   [address]    Sig: Take 1 tablet (4 mg) by mouth every 8 hours as needed for nausea    Class: E-Prescribe    Route: Oral    oxyCODONE (ROXICODONE) 5 MG tablet  12 tablet 0 5/15/2024 --   [address]    Sig: Take 1 tablet (5 mg) by mouth every 6 hours as needed for severe pain or moderate to severe pain    Class: E-Prescribe    Earliest Fill Date: 5/15/2024    Route: Oral    senna-docusate (SENOKOT-S/PERICOLACE) 8.6-50 MG tablet  30 tablet 0 5/15/2024 --   [address]    Sig: Take 1 tablet by mouth 2 times daily    Class: E-Prescribe    Route: Oral    Renewals       Renewal requests to authorizing provider (Catherine Byrd NP) <b>prohibited</b>            sulfamethoxazole-trimethoprim (BACTRIM) 400-80 MG tablet  14 tablet 0 6/10/2024 --       Sig: Take 1 tablet by mouth 2 times daily    Class: Local Print    Route: Oral            Exam:   Temp:  [98.1  F (36.7  C)] 98.1  F (36.7  C)  Pulse:  [91] 91  BP: (155)/(86) 155/86  SpO2:  [99 %] 99 %  Appearance: in no apparent distress.   Skin: normal, open wounds at epigastric and midclavicular right abdomen, nontender, nonerythematous, no drainage.   Respiratory: normal respiratory excursions  Cardiovascular: Regular rate  Abdomen: soft, nontender, nonerythemetous open wound  Extremeties: Edema, none  Neuro: without deficit       Labs:   Recent Labs   Lab Test 06/10/24  1358   BUN 17.0   CR 1.17*   GFRESTIMATED 56*   GLC 90     Recent Labs   Lab Test 06/10/24  1415   COLOR Light Yellow   APPEARANCE Clear   URINEGLC Negative   URINEBILI Negative   URINEKETONE Negative   SG 1.008   UBLD Small*   URINEPH 5.5   PROTEIN Negative   NITRITE Negative   LEUKEST Negative    RBCU 3*   WBCU 2     No lab results found.

## 2024-06-18 ENCOUNTER — TELEPHONE (OUTPATIENT)
Dept: TRANSPLANT | Facility: CLINIC | Age: 54
End: 2024-06-18
Payer: COMMERCIAL

## 2024-06-18 NOTE — TELEPHONE ENCOUNTER
Post-hospital outreach call made to check in post 4 weeks donation.     Jazzy states she finally feels like she is progressing in the right direction and feels a little better today.        Incision:improving, closing, intact  Pain: controlled with prn tylenol  Bowels: Bms with softeners   Appetite: fair, improving  Fluids: able to maintain hydration  Urinary: no issues urinating   Activity: up and walking without difficulty, using abd binder when walking.     Reviewed: abdominal precautions, lifting restrictions.    Next appointment: 07/01 surgeon follow up    Reminded Jazzy about [facility]OS follow-up requirements.     Jazzy knows how to get in touch with me or an on call coordinator with any questions or concerns.

## 2024-06-28 DIAGNOSIS — Z52.4 KIDNEY DONOR: Primary | ICD-10-CM

## 2024-07-01 ENCOUNTER — OFFICE VISIT (OUTPATIENT)
Dept: TRANSPLANT | Facility: CLINIC | Age: 54
End: 2024-07-01
Attending: SURGERY

## 2024-07-01 ENCOUNTER — LAB (OUTPATIENT)
Dept: LAB | Facility: CLINIC | Age: 54
End: 2024-07-01
Attending: SURGERY
Payer: COMMERCIAL

## 2024-07-01 DIAGNOSIS — Z52.4 KIDNEY DONOR: ICD-10-CM

## 2024-07-01 LAB
ALBUMIN MFR UR ELPH: 7.1 MG/DL
ALBUMIN SERPL BCG-MCNC: 4.2 G/DL (ref 3.5–5.2)
ALBUMIN UR-MCNC: NEGATIVE MG/DL
ALP SERPL-CCNC: 120 U/L (ref 40–150)
ALT SERPL W P-5'-P-CCNC: 14 U/L (ref 0–50)
APPEARANCE UR: CLEAR
AST SERPL W P-5'-P-CCNC: 17 U/L (ref 0–45)
BILIRUB DIRECT SERPL-MCNC: <0.2 MG/DL (ref 0–0.3)
BILIRUB SERPL-MCNC: 0.2 MG/DL
BILIRUB UR QL STRIP: NEGATIVE
COLOR UR AUTO: NORMAL
CREAT SERPL-MCNC: 1.18 MG/DL (ref 0.51–0.95)
CREAT UR-MCNC: 45.9 MG/DL
CREAT UR-MCNC: 46.1 MG/DL
EGFRCR SERPLBLD CKD-EPI 2021: 55 ML/MIN/1.73M2
GLUCOSE UR STRIP-MCNC: NEGATIVE MG/DL
HGB UR QL STRIP: NEGATIVE
KETONES UR STRIP-MCNC: NEGATIVE MG/DL
LEUKOCYTE ESTERASE UR QL STRIP: NEGATIVE
MICROALBUMIN UR-MCNC: <12 MG/L
MICROALBUMIN/CREAT UR: NORMAL MG/G{CREAT}
NITRATE UR QL: NEGATIVE
PH UR STRIP: 5.5 [PH] (ref 5–7)
PROT SERPL-MCNC: 7.3 G/DL (ref 6.4–8.3)
PROT/CREAT 24H UR: 0.15 MG/MG CR (ref 0–0.2)
RBC URINE: 2 /HPF
SP GR UR STRIP: 1.01 (ref 1–1.03)
UROBILINOGEN UR STRIP-MCNC: NORMAL MG/DL
WBC URINE: <1 /HPF

## 2024-07-01 PROCEDURE — 84156 ASSAY OF PROTEIN URINE: CPT | Performed by: PATHOLOGY

## 2024-07-01 PROCEDURE — 99213 OFFICE O/P EST LOW 20 MIN: CPT | Performed by: SURGERY

## 2024-07-01 PROCEDURE — 99024 POSTOP FOLLOW-UP VISIT: CPT | Mod: GC | Performed by: SURGERY

## 2024-07-01 PROCEDURE — 81001 URINALYSIS AUTO W/SCOPE: CPT | Performed by: PATHOLOGY

## 2024-07-01 PROCEDURE — 80076 HEPATIC FUNCTION PANEL: CPT | Performed by: PATHOLOGY

## 2024-07-01 PROCEDURE — 82043 UR ALBUMIN QUANTITATIVE: CPT | Performed by: INTERNAL MEDICINE

## 2024-07-01 PROCEDURE — 99000 SPECIMEN HANDLING OFFICE-LAB: CPT | Performed by: PATHOLOGY

## 2024-07-01 PROCEDURE — 82565 ASSAY OF CREATININE: CPT | Performed by: PATHOLOGY

## 2024-07-01 PROCEDURE — 36415 COLL VENOUS BLD VENIPUNCTURE: CPT | Performed by: PATHOLOGY

## 2024-07-01 NOTE — LETTER
7/1/2024      Jazzy Agosto  [address] [address] [facility] [place] [address]      Dear Colleague,    Thank you for referring your patient, Jazzy Agosto, to the [facility] TRANSPLANT [facility]IN[facility]. Please see a copy of my visit note below.    S[facility] Kidney Donor Post op Note    HPI: Jazzy Agosto is a 54 year old female who is s/p lap kidney donation in May 2024 . Doing well, no major issues.    Wounds are healing well.     Exam    There were no vitals taken for this visit.    No acute distress  RRR  EWOB  Abdomen soft, non distended, non tender, incisions well healed  2+ pulses    Assessment: Jazzy Agosto is a 54 year old female who is s/p lap kidney donation in May 2024.    Plan:  - Will see back at 6 months post op for labs    Jamari Moeller MD  Transplant Surgery Fellow    I have reviewed history, examined patient and discussed plan with the fellow/resident/TIMOTEO.  I concur with the findings in this note.    Patient was counseled on complications of incision and short and long term care to minimize infection/hernia risk.    Total time: 20 min  Including pre-encounter, face to face and post-encounter time spent on this patient's visit.                         Again, thank you for allowing me to participate in the care of your patient.        Sincerely,        Kelly Barker MD

## 2024-07-01 NOTE — PROGRESS NOTES
S[facility] Kidney Donor Post op Note    HPI: Jazzy Agosto is a 54 year old female who is s/p lap kidney donation in May 2024 . Doing well, no major issues.    Wounds are healing well.     Exam    There were no vitals taken for this visit.    No acute distress  RRR  EWOB  Abdomen soft, non distended, non tender, incisions well healed  2+ pulses    Assessment: Jazzy Agosto is a 54 year old female who is s/p lap kidney donation in May 2024.    Plan:  - Will see back at 6 months post op for labs    Jamari Moeller MD  Transplant Surgery Fellow    I have reviewed history, examined patient and discussed plan with the fellow/resident/TIMOTEO.  I concur with the findings in this note.    Patient was counseled on complications of incision and short and long term care to minimize infection/hernia risk.    Total time: 20 min  Including pre-encounter, face to face and post-encounter time spent on this patient's visit.

## 2024-07-08 ENCOUNTER — TELEPHONE (OUTPATIENT)
Dept: TRANSPLANT | Facility: CLINIC | Age: 54
End: 2024-07-08
Payer: COMMERCIAL

## 2024-07-08 NOTE — TELEPHONE ENCOUNTER
LVM to check in on how Jazzy is feeling. Requested a call back.     AUNG Liu, [facility]   Independent Living Donor Advocate  [facility], [facility], [facility]  Direct: [phone]  E-Mail: [email or web address]

## 2024-07-22 ENCOUNTER — OFFICE VISIT (OUTPATIENT)
Dept: TRANSPLANT | Facility: CLINIC | Age: 54
End: 2024-07-22
Attending: NURSE PRACTITIONER

## 2024-07-22 VITALS
SYSTOLIC BLOOD PRESSURE: 125 MMHG | WEIGHT: 165.3 LBS | HEART RATE: 77 BPM | OXYGEN SATURATION: 100 % | TEMPERATURE: 99.2 F | RESPIRATION RATE: 18 BRPM | BODY MASS INDEX: 27.88 KG/M2 | DIASTOLIC BLOOD PRESSURE: 79 MMHG

## 2024-07-22 DIAGNOSIS — T14.8XXA SKIN WOUND FROM SURGICAL INCISION: Primary | ICD-10-CM

## 2024-07-22 PROCEDURE — 250N000009 HC RX 250: Performed by: SURGERY

## 2024-07-22 PROCEDURE — 99024 POSTOP FOLLOW-UP VISIT: CPT | Performed by: NURSE PRACTITIONER

## 2024-07-22 PROCEDURE — 99213 OFFICE O/P EST LOW 20 MIN: CPT | Performed by: NURSE PRACTITIONER

## 2024-07-22 RX ORDER — LIDOCAINE HYDROCHLORIDE 10 MG/ML
10 INJECTION, SOLUTION EPIDURAL; INFILTRATION; INTRACAUDAL; PERINEURAL ONCE
Status: COMPLETED | OUTPATIENT
Start: 2024-07-22 | End: 2024-07-22

## 2024-07-22 RX ADMIN — LIDOCAINE HYDROCHLORIDE 10 ML: 10 INJECTION, SOLUTION EPIDURAL; INFILTRATION; INTRACAUDAL; PERINEURAL at 15:35

## 2024-07-22 ASSESSMENT — PAIN SCALES - GENERAL: PAINLEVEL: NO PAIN (1)

## 2024-07-22 NOTE — NURSING NOTE
"Chief Complaint   Patient presents with    Surgical Followup     Kidney donor, wound check.      Vital signs:  Temp: 99.2  F (37.3  C) Temp src: Oral BP: 125/79 Pulse: 77   Resp: 18 SpO2: 100 %       Weight: 75 kg (165 lb 4.8 oz)  Estimated body mass index is 27.88 kg/m  as calculated from the following:    Height as of 5/14/24: 1.64 m (5' 4.57\").    Weight as of this encounter: 75 kg (165 lb 4.8 oz).      Meeta Soares, [facility]  7/22/2024 2:24 PM    "

## 2024-07-22 NOTE — LETTER
7/22/2024      Jazzy Agosto  [address] [facility] [address]      Dear Colleague,    Thank you for referring your patient, Jazzy Agosto, to the [facility]. Please see a copy of my visit note below.    Transplant Surgery Kidney Donor Progress Note    Medical record number: [id number]  YOB: 1970,   Date of Visit:  07/22/2024  For followup after kidney donation.    Assessment and Recommendations: Ms. Agosto is doing fairly well after kidney donation.     1. Incisional wound, delayed healing.    -wound opened up by Dr. Moeller. Patient to pack daily.       Patient seen with Katelyn Moeller and Mj.   Total time: 30 minutes  Counselling time: 10 minutes      Local wound exploration:    The right lower quadrant wound was cleaned with a clorohexidine prep stick, and the area was infiltrated with 8 ml of 1% lidocaine. The incision was opened up with a scalpel and packed with iodoform dressing.     Jamari Moeller MD  Transplant Surgery Fellow      ---------------------------------------------------------------------------------------------------    S: Ms. Agosto donated a kidney 8 weeks ago and is doing fairly well.  Recovery c/b open and draining incisional wound that she was packing. Had fevers last month. CT done in June showing small amount of fluid on R port side but this was expressed on exam and culture taken.   Cx grew staph and patient completed 1 week of Bactrim.   Saw surgeon 2 weeks ago and  1 open wound was still present but was stable.     Presents today for wound check. Noticing more purulent drainage. Overall wound is smaller but appears deeper. Has been clealing wound daily with soap and covering with band aid. Changes twice a day with band aid and pad typically covered in pus or pinkish drainage.      Doesn't check temp daily but when she does, it has been ranging .0   No chills or worsening fatigue.   No new pains but site is sore.   States area is  just annoying that it isn't closing.          Medications:  Prescription Medications as of 7/22/2024         Rx Number Disp Refills Start End Last Dispensed Date Next Fill Date Owning Pharmacy    acetaminophen (TYLENOL) 325 MG tablet  60 tablet 0 5/15/2024 --   [address]    Sig: Take 2 tablets (650 mg) by mouth every 6 hours as needed for mild pain or fever    Class: E-Prescribe    Route: Oral    Renewals       Renewal requests to authorizing provider (Catherine Byrd NP) <b>prohibited</b>            methocarbamol (ROBAXIN) 500 MG tablet  12 tablet 0 5/17/2024 --   [id number] - [place] [address] [facility] [address] [address] [employer] [facility] AT [facility] [address]     Sig: Take 1 tablet (500 mg) by mouth 4 times daily as needed for muscle spasms    Class: E-Prescribe    Route: Oral    methocarbamol (ROBAXIN) 500 MG tablet  8 tablet 0 5/15/2024 --   [address]    Sig: Take 1 tablet (500 mg) by mouth 4 times daily as needed for muscle spasms    Class: E-Prescribe    Route: Oral    Renewals       Renewal requests to authorizing provider (Catherine Byrd NP) <b>prohibited</b>            ondansetron (ZOFRAN ODT) 4 MG ODT tab  15 tablet 0 5/15/2024 --   [address]    Sig: Take 1 tablet (4 mg) by mouth every 8 hours as needed for nausea    Class: E-Prescribe    Route: Oral    oxyCODONE (ROXICODONE) 5 MG tablet  12 tablet 0 5/15/2024 --   [address]    Sig: Take 1 tablet (5 mg) by mouth every 6 hours as needed for severe pain or moderate to severe pain    Class: E-Prescribe    Earliest Fill Date: 5/15/2024    Route: Oral    senna-docusate (SENOKOT-S/PERICOLACE) 8.6-50 MG tablet  30 tablet 0 5/15/2024 --   [address]    Sig: Take 1  tablet by mouth 2 times daily    Class: E-Prescribe    Route: Oral    Renewals       Renewal requests to authorizing provider (Catherine Byrd NP) <b>prohibited</b>            sulfamethoxazole-trimethoprim (BACTRIM) 400-80 MG tablet  14 tablet 0 6/10/2024 --       Sig: Take 1 tablet by mouth 2 times daily    Class: Local Print    Route: Oral            Exam:      Appearance: in no apparent distress.   Abdomen: flat, RLQ lap site with 4mm opening of  5mm depth. Scant purulence expressed on exam. No surrounding erythema or induration. No odor.   Other incisions well healed.         Local wound exploration:    The right lower quadrant wound was cleaned with a clorohexidine prep stick, and the area was infiltrated with 8 ml of 1% lidocaine. The incision was opened up with a scalpel and packed with iodoform dressing.     Jamari Moeller MD  Transplant Surgery Fellow    Labs:   Recent Labs   Lab Test 07/01/24  1520 06/17/24  1545 06/10/24  1358   BUN  --   --  17.0   CR 1.18*   < > 1.17*   GFRESTIMATED 55*   < > 56*   GLC  --   --  90    < > = values in this interval not displayed.     Recent Labs   Lab Test 07/01/24  1523   COLOR Straw   APPEARANCE Clear   URINEGLC Negative   URINEBILI Negative   URINEKETONE Negative   SG 1.012   UBLD Negative   URINEPH 5.5   PROTEIN Negative   NITRITE Negative   LEUKEST Negative   RBCU 2   WBCU <1     No lab results found.       Again, thank you for allowing me to participate in the care of your patient.        Sincerely,        KENNETH Sherman CNP

## 2024-07-22 NOTE — PROGRESS NOTES
Transplant Surgery Kidney Donor Progress Note    Medical record number: [id number]  YOB: 1970,   Date of Visit:  07/22/2024  For followup after kidney donation.    Assessment and Recommendations: Ms. Agosto is doing fairly well after kidney donation.     1. Incisional wound, delayed healing.    -wound opened up by Dr. Moeller. Patient to pack daily.       Patient seen with Katelyn Moeller and Mj.   Total time: 30 minutes  Counselling time: 10 minutes      Local wound exploration:    The right lower quadrant wound was cleaned with a clorohexidine prep stick, and the area was infiltrated with 8 ml of 1% lidocaine. The incision was opened up with a scalpel and packed with iodoform dressing.     Jamari Moeller MD  Transplant Surgery Fellow      ---------------------------------------------------------------------------------------------------    S: Ms. Agosto donated a kidney 8 weeks ago and is doing fairly well.  Recovery c/b open and draining incisional wound that she was packing. Had fevers last month. CT done in June showing small amount of fluid on R port side but this was expressed on exam and culture taken.   Cx grew staph and patient completed 1 week of Bactrim.   Saw surgeon 2 weeks ago and  1 open wound was still present but was stable.     Presents today for wound check. Noticing more purulent drainage. Overall wound is smaller but appears deeper. Has been clealing wound daily with soap and covering with band aid. Changes twice a day with band aid and pad typically covered in pus or pinkish drainage.      Doesn't check temp daily but when she does, it has been ranging .0   No chills or worsening fatigue.   No new pains but site is sore.   States area is just annoying that it isn't closing.          Medications:  Prescription Medications as of 7/22/2024         Rx Number Disp Refills Start End Last Dispensed Date Next Fill Date Owning Pharmacy    acetaminophen (TYLENOL) 325 MG tablet  60 tablet  0 5/15/2024 --   [address]    Sig: Take 2 tablets (650 mg) by mouth every 6 hours as needed for mild pain or fever    Class: E-Prescribe    Route: Oral    Renewals       Renewal requests to authorizing provider (Catherine Byrd NP) <b>prohibited</b>            methocarbamol (ROBAXIN) 500 MG tablet  12 tablet 0 5/17/2024 --   [id number] [place] - [address] [facility] AT [facility] [address]     Sig: Take 1 tablet (500 mg) by mouth 4 times daily as needed for muscle spasms    Class: E-Prescribe    Route: Oral    methocarbamol (ROBAXIN) 500 MG tablet  8 tablet 0 5/15/2024 --   [address]    Sig: Take 1 tablet (500 mg) by mouth 4 times daily as needed for muscle spasms    Class: E-Prescribe    Route: Oral    Renewals       Renewal requests to authorizing provider (Catherine Byrd NP) <b>prohibited</b>            ondansetron (ZOFRAN ODT) 4 MG ODT tab  15 tablet 0 5/15/2024 --   [address]    Sig: Take 1 tablet (4 mg) by mouth every 8 hours as needed for nausea    Class: E-Prescribe    Route: Oral    oxyCODONE (ROXICODONE) 5 MG tablet  12 tablet 0 5/15/2024 --   [address]    Sig: Take 1 tablet (5 mg) by mouth every 6 hours as needed for severe pain or moderate to severe pain    Class: E-Prescribe    Earliest Fill Date: 5/15/2024    Route: Oral    senna-docusate (SENOKOT-S/PERICOLACE) 8.6-50 MG tablet  30 tablet 0 5/15/2024 --   [address]    Sig: Take 1 tablet by mouth 2 times daily    Class: E-Prescribe    Route: Oral    Renewals       Renewal requests to authorizing provider (Catherine Byrd NP) <b>prohibited</b>            sulfamethoxazole-trimethoprim (BACTRIM) 400-80 MG tablet  14  tablet 0 6/10/2024 --       Sig: Take 1 tablet by mouth 2 times daily    Class: Local Print    Route: Oral            Exam:      Appearance: in no apparent distress.   Abdomen: flat, RLQ lap site with 4mm opening of  5mm depth. Scant purulence expressed on exam. No surrounding erythema or induration. No odor.   Other incisions well healed.         Local wound exploration:    The right lower quadrant wound was cleaned with a clorohexidine prep stick, and the area was infiltrated with 8 ml of 1% lidocaine. The incision was opened up with a scalpel and packed with iodoform dressing.     Jamari Moeller MD  Transplant Surgery Fellow    Labs:   Recent Labs   Lab Test 07/01/24  1520 06/17/24  1545 06/10/24  1358   BUN  --   --  17.0   CR 1.18*   < > 1.17*   GFRESTIMATED 55*   < > 56*   GLC  --   --  90    < > = values in this interval not displayed.     Recent Labs   Lab Test 07/01/24  1523   COLOR Straw   APPEARANCE Clear   URINEGLC Negative   URINEBILI Negative   URINEKETONE Negative   SG 1.012   UBLD Negative   URINEPH 5.5   PROTEIN Negative   NITRITE Negative   LEUKEST Negative   RBCU 2   WBCU <1     No lab results found.

## 2024-07-23 DIAGNOSIS — Z52.4 KIDNEY DONOR: Primary | ICD-10-CM

## 2024-08-05 ENCOUNTER — LAB (OUTPATIENT)
Dept: LAB | Facility: CLINIC | Age: 54
End: 2024-08-05
Payer: COMMERCIAL

## 2024-08-05 ENCOUNTER — ANCILLARY PROCEDURE (OUTPATIENT)
Dept: GENERAL RADIOLOGY | Facility: CLINIC | Age: 54
End: 2024-08-05
Attending: NURSE PRACTITIONER
Payer: COMMERCIAL

## 2024-08-05 ENCOUNTER — OFFICE VISIT (OUTPATIENT)
Dept: TRANSPLANT | Facility: CLINIC | Age: 54
End: 2024-08-05
Attending: NURSE PRACTITIONER

## 2024-08-05 VITALS
SYSTOLIC BLOOD PRESSURE: 144 MMHG | HEART RATE: 76 BPM | DIASTOLIC BLOOD PRESSURE: 92 MMHG | TEMPERATURE: 98.3 F | OXYGEN SATURATION: 98 % | BODY MASS INDEX: 28.21 KG/M2 | WEIGHT: 167.3 LBS

## 2024-08-05 DIAGNOSIS — Z00.5 TRANSPLANT DONOR EVALUATION: ICD-10-CM

## 2024-08-05 DIAGNOSIS — Z52.4 KIDNEY DONOR: ICD-10-CM

## 2024-08-05 DIAGNOSIS — Z01.818 PRE-OP EVALUATION: ICD-10-CM

## 2024-08-05 DIAGNOSIS — Z01.818 PRE-OP EVALUATION: Primary | ICD-10-CM

## 2024-08-05 LAB
A1 AG RBC QL: POSITIVE
ALBUMIN MFR UR ELPH: 6.4 MG/DL
ALBUMIN UR-MCNC: NEGATIVE MG/DL
ANION GAP SERPL CALCULATED.3IONS-SCNC: 9 MMOL/L (ref 7–15)
APPEARANCE UR: CLEAR
BASOPHILS # BLD AUTO: 0 10E3/UL (ref 0–0.2)
BASOPHILS NFR BLD AUTO: 0 %
BILIRUB UR QL STRIP: NEGATIVE
BUN SERPL-MCNC: 18.9 MG/DL (ref 6–20)
CALCIUM SERPL-MCNC: 9.4 MG/DL (ref 8.8–10.4)
CHLORIDE SERPL-SCNC: 104 MMOL/L (ref 98–107)
COLOR UR AUTO: NORMAL
CREAT SERPL-MCNC: 1.2 MG/DL (ref 0.51–0.95)
CREAT UR-MCNC: 37.9 MG/DL
CREAT UR-MCNC: 38.6 MG/DL
EGFRCR SERPLBLD CKD-EPI 2021: 54 ML/MIN/1.73M2
EOSINOPHIL # BLD AUTO: 0.1 10E3/UL (ref 0–0.7)
EOSINOPHIL NFR BLD AUTO: 1 %
ERYTHROCYTE [DISTWIDTH] IN BLOOD BY AUTOMATED COUNT: 12.9 % (ref 10–15)
GLUCOSE SERPL-MCNC: 93 MG/DL (ref 70–99)
GLUCOSE UR STRIP-MCNC: NEGATIVE MG/DL
HCG INTACT+B SERPL-ACNC: 2 MIU/ML
HCO3 SERPL-SCNC: 28 MMOL/L (ref 22–29)
HCT VFR BLD AUTO: 45.1 % (ref 35–47)
HGB BLD-MCNC: 14.8 G/DL (ref 11.7–15.7)
HGB UR QL STRIP: NEGATIVE
IMM GRANULOCYTES # BLD: 0 10E3/UL
IMM GRANULOCYTES NFR BLD: 0 %
INR PPP: 1 (ref 0.85–1.15)
KETONES UR STRIP-MCNC: NEGATIVE MG/DL
LEUKOCYTE ESTERASE UR QL STRIP: NEGATIVE
LYMPHOCYTES # BLD AUTO: 1 10E3/UL (ref 0.8–5.3)
LYMPHOCYTES NFR BLD AUTO: 20 %
MCH RBC QN AUTO: 30.1 PG (ref 26.5–33)
MCHC RBC AUTO-ENTMCNC: 32.8 G/DL (ref 31.5–36.5)
MCV RBC AUTO: 92 FL (ref 78–100)
MICROALBUMIN UR-MCNC: <12 MG/L
MICROALBUMIN/CREAT UR: NORMAL MG/G{CREAT}
MONOCYTES # BLD AUTO: 0.3 10E3/UL (ref 0–1.3)
MONOCYTES NFR BLD AUTO: 5 %
NEUTROPHILS # BLD AUTO: 3.9 10E3/UL (ref 1.6–8.3)
NEUTROPHILS NFR BLD AUTO: 74 %
NITRATE UR QL: NEGATIVE
NRBC # BLD AUTO: 0 10E3/UL
NRBC BLD AUTO-RTO: 0 /100
PH UR STRIP: 6.5 [PH] (ref 5–7)
PLATELET # BLD AUTO: 246 10E3/UL (ref 150–450)
POTASSIUM SERPL-SCNC: 4.2 MMOL/L (ref 3.4–5.3)
PROT/CREAT 24H UR: 0.17 MG/MG CR (ref 0–0.2)
RBC # BLD AUTO: 4.92 10E6/UL (ref 3.8–5.2)
RBC URINE: 1 /HPF
SODIUM SERPL-SCNC: 141 MMOL/L (ref 135–145)
SP GR UR STRIP: 1.01 (ref 1–1.03)
SPECIMEN EXPIRATION DATE: NORMAL
SQUAMOUS EPITHELIAL: <1 /HPF
UROBILINOGEN UR STRIP-MCNC: NORMAL MG/DL
WBC # BLD AUTO: 5.3 10E3/UL (ref 4–11)
WBC URINE: <1 /HPF

## 2024-08-05 PROCEDURE — 36415 COLL VENOUS BLD VENIPUNCTURE: CPT | Performed by: PATHOLOGY

## 2024-08-05 PROCEDURE — 85025 COMPLETE CBC W/AUTO DIFF WBC: CPT | Performed by: PATHOLOGY

## 2024-08-05 PROCEDURE — 99000 SPECIMEN HANDLING OFFICE-LAB: CPT | Performed by: PATHOLOGY

## 2024-08-05 PROCEDURE — 84702 CHORIONIC GONADOTROPIN TEST: CPT | Performed by: PATHOLOGY

## 2024-08-05 PROCEDURE — 81001 URINALYSIS AUTO W/SCOPE: CPT | Performed by: PATHOLOGY

## 2024-08-05 PROCEDURE — 99024 POSTOP FOLLOW-UP VISIT: CPT | Performed by: NURSE PRACTITIONER

## 2024-08-05 PROCEDURE — 80048 BASIC METABOLIC PNL TOTAL CA: CPT | Performed by: PATHOLOGY

## 2024-08-05 PROCEDURE — 82570 ASSAY OF URINE CREATININE: CPT | Performed by: SURGERY

## 2024-08-05 PROCEDURE — 85610 PROTHROMBIN TIME: CPT | Performed by: PATHOLOGY

## 2024-08-05 PROCEDURE — 84156 ASSAY OF PROTEIN URINE: CPT | Performed by: PATHOLOGY

## 2024-08-05 PROCEDURE — 99213 OFFICE O/P EST LOW 20 MIN: CPT | Performed by: NURSE PRACTITIONER

## 2024-08-05 PROCEDURE — 93000 ELECTROCARDIOGRAM COMPLETE: CPT | Performed by: INTERNAL MEDICINE

## 2024-08-05 PROCEDURE — 86905 BLOOD TYPING RBC ANTIGENS: CPT

## 2024-08-05 PROCEDURE — 71046 X-RAY EXAM CHEST 2 VIEWS: CPT | Mod: GC | Performed by: RADIOLOGY

## 2024-08-05 NOTE — LETTER
8/5/2024      Jazzy Agosto  [address] [facility] [address]      Dear Colleague,    Thank you for referring your patient, Jazzy Agosto, to the [facility] TR[facility]PL[facility]. Please see a copy of my visit note below.    Preoperative Evaluation  [facility]  [address] [address]  Phone: [phone]  Fax: [phone]  Primary Provider: Charmaine Gonzalez,   Pre-op Performing Provider: KENNETH Sherman CNP  Aug 5, 2024               8/5/2024   Surgical Information   What procedure is being done? Irrigation and debridement abdomen washout, combined   Facility or Hospital where procedure/surgery will be performed: [facility]   Who is doing the procedure / surgery? Dr. Barker   Date of surgery / procedure: 8/7/24   Time of surgery / procedure: 1345   Where do you plan to recover after surgery? at home with family      Fax number for surgical facility: Note does not need to be faxed, will be available electronically in Epic.    Assessment & Plan    The proposed surgical procedure is considered INTERMEDIATE risk.    Open incisional wound s/p kidney donation        Pre-op evaluation    - XR Chest 2 Views; Future  - EKG 12-lead complete with read (future); Future  - INR; Future  - CBC with Platelets & Differential; Future  - Basic metabolic panel; Future     - No identified additional risk factors other than previously addressed    Recommendation  Approval given to proceed with proposed procedure.       KENNETH Sherman          Subjective  Jazzy is a 54 year old, presenting for the following: Pre op H&P       HPI related to upcoming procedure:     54 year old female who is s/p lap kidney donation in May 2024. Has had open incisional wound since sx with mild drainage and intermittent low grade fevers. Wound has been packed several times and still unable to close fully.                   8/5/2024   Pre-Op Questionnaire   Have you ever had a  heart attack or stroke? No   Have you ever had surgery on your heart or blood vessels, such as a stent placement, a coronary artery bypass, or surgery on an artery in your head, neck, heart, or legs? No   Do you have chest pain with activity? No   Do you have a history of heart failure? No   Do you currently have a cold, bronchitis or symptoms of other infection? No   Do you have a cough, shortness of breath, or wheezing? No   Do you or anyone in your family have previous history of blood clots? No   Do you or does anyone in your family have a serious bleeding problem such as prolonged bleeding following surgeries or cuts? No   Have you ever had problems with anemia or been told to take iron pills? No   Have you had any abnormal blood loss such as black, tarry or bloody stools, or abnormal vaginal bleeding? No   Have you ever had a blood transfusion? No   Are you willing to have a blood transfusion if it is medically needed before, during, or after your surgery? Yes   Have you or any of your relatives ever had problems with anesthesia? No   Do you have sleep apnea, excessive snoring or daytime drowsiness? No   Do you have any artifical heart valves or other implanted medical devices like a pacemaker, defibrillator, or continuous glucose monitor? No   Do you have artificial joints? No   Are you allergic to latex? No           Status of Chronic Conditions:  See problem list for active medical problems.  Problems all longstanding and stable, except as noted/documented.  See ROS for pertinent symptoms related to these conditions.    Patient Active Problem List    Diagnosis Date Noted     Encounter for donation of kidney 05/14/2024     Priority: Medium     Dyslipidemia 01/23/2024     Priority: Medium     Dysplastic nevus 01/23/2024     Priority: Medium     DJD (degenerative joint disease) of knee 01/23/2024     Priority: Medium     Transplant donor evaluation 11/21/2023     Priority: Medium      Past Medical History:  "  Diagnosis Date     DJD (degenerative joint disease) of knee      Dyslipidemia      Dysplastic nevus      Urinary tract infection      Past Surgical History:   Procedure Laterality Date     LAPAROSCOPIC DONOR HAND ASSISTED KIDNEY NON-DIRECTED Right 5/14/2024    Procedure: Laparoscopic Hand Assisted (Anonymous) Living Non-Directed Kidney Donor;  Surgeon: Kelly Barker MD;  Location:  OR     NO HISTORY OF SURGERY       Current Outpatient Medications   Medication Sig Dispense Refill     acetaminophen (TYLENOL) 325 MG tablet Take 2 tablets (650 mg) by mouth every 6 hours as needed for mild pain or fever (Patient not taking: Reported on 7/1/2024) 60 tablet 0     methocarbamol (ROBAXIN) 500 MG tablet Take 1 tablet (500 mg) by mouth 4 times daily as needed for muscle spasms 12 tablet 0     methocarbamol (ROBAXIN) 500 MG tablet Take 1 tablet (500 mg) by mouth 4 times daily as needed for muscle spasms 8 tablet 0     ondansetron (ZOFRAN ODT) 4 MG ODT tab Take 1 tablet (4 mg) by mouth every 8 hours as needed for nausea (Patient not taking: Reported on 5/23/2024) 15 tablet 0     oxyCODONE (ROXICODONE) 5 MG tablet Take 1 tablet (5 mg) by mouth every 6 hours as needed for severe pain or moderate to severe pain 12 tablet 0     senna-docusate (SENOKOT-S/PERICOLACE) 8.6-50 MG tablet Take 1 tablet by mouth 2 times daily (Patient not taking: Reported on 7/1/2024) 30 tablet 0     sulfamethoxazole-trimethoprim (BACTRIM) 400-80 MG tablet Take 1 tablet by mouth 2 times daily 14 tablet 0       Allergies   Allergen Reactions     Sertraline Unknown     \"Didn't work, felt more depressed\"        Social History     Tobacco Use     Smoking status: Never     Smokeless tobacco: Never   Substance Use Topics     Alcohol use: Never     Family History   Problem Relation Age of Onset     Breast Cancer Mother      Diabetes Type 2  Father      Hypertension Father      Coronary Artery Disease Father      Kidney Disease Father      History   Drug " "Use Unknown             Review of Systems  Constitutional, neuro, ENT, endocrine, pulmonary, cardiac, gastrointestinal, genitourinary, musculoskeletal, integument and psychiatric systems are negative, except as otherwise noted.    Objective   BP (!) 144/92   Pulse 76   Temp 98.3  F (36.8  C) (Oral)   Wt 75.9 kg (167 lb 4.8 oz)   SpO2 98%   BMI 28.21 kg/m     Estimated body mass index is 28.21 kg/m  as calculated from the following:    Height as of 5/14/24: 1.64 m (5' 4.57\").    Weight as of this encounter: 75.9 kg (167 lb 4.8 oz).  Physical Exam  GENERAL: alert and tearful   EYES: Eyes grossly normal to inspection, PERRL and conjunctivae and sclerae normal  HENT: nose and mouth without ulcers or lesions  NECK: no adenopathy, no asymmetry, masses, or scars  RESP: lungs clear to auscultation - no rales, rhonchi or wheezes  CV: regular rate and rhythm, normal S1 S2, no S3 or S4, no murmur, click or rub, no peripheral edema  ABDOMEN: soft, nontender, no hepatosplenomegaly, no masses and bowel sounds normal. R lower abdomen with 3mm opening with red wound bed. No tunneling. No drainage. No surrounding erythema or induration. Midline lap sites well healed. Upper midline with keloid scarring but no erythema.   MS: no gross musculoskeletal defects noted, no edema  SKIN: no rashes  NEURO: Normal strength and tone, mentation intact and speech normal      Recent Labs   Lab Test 08/05/24  1134 07/01/24  1520 06/17/24  1545 06/10/24  1358 05/06/24  1208 01/18/24  0659   HGB 14.8  --   --  13.7   < > 15.1     --   --  295   < > 233   INR 1.00  --   --   --   --  1.13     --   --  139   < > 140   POTASSIUM 4.2  --   --  4.1   < > 3.8   CR 1.20* 1.18*   < > 1.17*   < > 0.77   A1C  --   --   --   --   --  5.5    < > = values in this interval not displayed.        Diagnostics  Recent Results (from the past 24 hour(s))   Blood Group A Subtype    Collection Time: 08/05/24 11:34 AM   Result Value Ref Range    A1 " Antigen Type Positive     SPECIMEN EXPIRATION DATE [id number]    hCG Quantitative Pregnancy    Collection Time: 08/05/24 11:34 AM   Result Value Ref Range    hCG Quantitative 2 <5 mIU/mL   INR    Collection Time: 08/05/24 11:34 AM   Result Value Ref Range    INR 1.00 0.85 - 1.15   Basic metabolic panel    Collection Time: 08/05/24 11:34 AM   Result Value Ref Range    Sodium 141 135 - 145 mmol/L    Potassium 4.2 3.4 - 5.3 mmol/L    Chloride 104 98 - 107 mmol/L    Carbon Dioxide (CO2) 28 22 - 29 mmol/L    Anion Gap 9 7 - 15 mmol/L    Urea Nitrogen 18.9 6.0 - 20.0 mg/dL    Creatinine 1.20 (H) 0.51 - 0.95 mg/dL    GFR Estimate 54 (L) >60 mL/min/1.73m2    Calcium 9.4 8.8 - 10.4 mg/dL    Glucose 93 70 - 99 mg/dL   CBC with platelets and differential    Collection Time: 08/05/24 11:34 AM   Result Value Ref Range    WBC Count 5.3 4.0 - 11.0 10e3/uL    RBC Count 4.92 3.80 - 5.20 10e6/uL    Hemoglobin 14.8 11.7 - 15.7 g/dL    Hematocrit 45.1 35.0 - 47.0 %    MCV 92 78 - 100 fL    MCH 30.1 26.5 - 33.0 pg    MCHC 32.8 31.5 - 36.5 g/dL    RDW 12.9 10.0 - 15.0 %    Platelet Count 246 150 - 450 10e3/uL    % Neutrophils 74 %    % Lymphocytes 20 %    % Monocytes 5 %    % Eosinophils 1 %    % Basophils 0 %    % Immature Granulocytes 0 %    NRBCs per 100 WBC 0 <1 /100    Absolute Neutrophils 3.9 1.6 - 8.3 10e3/uL    Absolute Lymphocytes 1.0 0.8 - 5.3 10e3/uL    Absolute Monocytes 0.3 0.0 - 1.3 10e3/uL    Absolute Eosinophils 0.1 0.0 - 0.7 10e3/uL    Absolute Basophils 0.0 0.0 - 0.2 10e3/uL    Absolute Immature Granulocytes 0.0 <=0.4 10e3/uL    Absolute NRBCs 0.0 10e3/uL   EKG 12-lead complete with read (future)    Collection Time: 08/05/24 11:55 AM   Result Value Ref Range    Systolic Blood Pressure  mmHg    Diastolic Blood Pressure  mmHg    Ventricular Rate 61 BPM    Atrial Rate 61 BPM    IL Interval 178 ms    QRS Duration 74 ms     ms    QTc 432 ms    P Axis 67 degrees    R AXIS 9 degrees    T Axis 23 degrees     Interpretation ECG       Sinus rhythm  Normal ECG  When compared with ECG of 18-Jan-2024 12:39,  No significant change was found     UA with Microscopic reflex to Culture    Collection Time: 08/05/24 12:12 PM    Specimen: Urine, Midstream   Result Value Ref Range    Color Urine Straw Colorless, Straw, Light Yellow, Yellow    Appearance Urine Clear Clear    Glucose Urine Negative Negative mg/dL    Bilirubin Urine Negative Negative    Ketones Urine Negative Negative mg/dL    Specific Gravity Urine 1.008 1.003 - 1.035    Blood Urine Negative Negative    pH Urine 6.5 5.0 - 7.0    Protein Albumin Urine Negative Negative mg/dL    Urobilinogen Urine Normal Normal, 2.0 mg/dL    Nitrite Urine Negative Negative    Leukocyte Esterase Urine Negative Negative    RBC Urine 1 <=2 /HPF    WBC Urine <1 <=5 /HPF    Squamous Epithelials Urine <1 <=1 /HPF   Protein  random urine    Collection Time: 08/05/24 12:13 PM   Result Value Ref Range    Total Protein Urine mg/dL 6.4   mg/dL    Total Protein Urine mg/mg Creat 0.17 0.00 - 0.20 mg/mg Cr    Creatinine Urine mg/dL 37.9 mg/dL      EKG: Normal Sinus Rhythm  CXR: clear chest 8/5/24    Revised Cardiac Risk Index (RCRI)  The patient has the following serious cardiovascular risks for perioperative complications:   - No serious cardiac risks = 0 points     RCRI Interpretation: 0 points: Class I (very low risk - 0.4% complication rate)         Signed Electronically by: KENNETH Sherman CNP  A copy of this evaluation report is provided to the requesting physician.              Again, thank you for allowing me to participate in the care of your patient.        Sincerely,        KENNETH Sherman CNP

## 2024-08-05 NOTE — PROGRESS NOTES
Preoperative Evaluation  [facility]  [address] [facility] [address]  Phone: [phone]  Fax: [phone]  Primary Provider: Charmaine Gonzalez,   Pre-op Performing Provider: KENNETH Sherman CNP  Aug 5, 2024               8/5/2024   Surgical Information   What procedure is being done? Irrigation and debridement abdomen washout, combined   Facility or Hospital where procedure/surgery will be performed: [facility]   Who is doing the procedure / surgery? Dr. Barker   Date of surgery / procedure: 8/7/24   Time of surgery / procedure: 1345   Where do you plan to recover after surgery? at home with family      Fax number for surgical facility: Note does not need to be faxed, will be available electronically in Epic.    Assessment & Plan     The proposed surgical procedure is considered INTERMEDIATE risk.    Open incisional wound s/p kidney donation        Pre-op evaluation    - XR Chest 2 Views; Future  - EKG 12-lead complete with read (future); Future  - INR; Future  - CBC with Platelets & Differential; Future  - Basic metabolic panel; Future     - No identified additional risk factors other than previously addressed    Recommendation  Approval given to proceed with proposed procedure.       KENNETH Sherman          Elizabeth Purcell is a 54 year old, presenting for the following: Pre op H&P       HPI related to upcoming procedure:     54 year old female who is s/p lap kidney donation in May 2024. Has had open incisional wound since sx with mild drainage and intermittent low grade fevers. Wound has been packed several times and still unable to close fully.                   8/5/2024   Pre-Op Questionnaire   Have you ever had a heart attack or stroke? No   Have you ever had surgery on your heart or blood vessels, such as a stent placement, a coronary artery bypass, or surgery on an artery in your head, neck, heart, or legs? No   Do you have chest pain with activity?  No   Do you have a history of heart failure? No   Do you currently have a cold, bronchitis or symptoms of other infection? No   Do you have a cough, shortness of breath, or wheezing? No   Do you or anyone in your family have previous history of blood clots? No   Do you or does anyone in your family have a serious bleeding problem such as prolonged bleeding following surgeries or cuts? No   Have you ever had problems with anemia or been told to take iron pills? No   Have you had any abnormal blood loss such as black, tarry or bloody stools, or abnormal vaginal bleeding? No   Have you ever had a blood transfusion? No   Are you willing to have a blood transfusion if it is medically needed before, during, or after your surgery? Yes   Have you or any of your relatives ever had problems with anesthesia? No   Do you have sleep apnea, excessive snoring or daytime drowsiness? No   Do you have any artifical heart valves or other implanted medical devices like a pacemaker, defibrillator, or continuous glucose monitor? No   Do you have artificial joints? No   Are you allergic to latex? No           Status of Chronic Conditions:  See problem list for active medical problems.  Problems all longstanding and stable, except as noted/documented.  See ROS for pertinent symptoms related to these conditions.    Patient Active Problem List    Diagnosis Date Noted    Encounter for donation of kidney 05/14/2024     Priority: Medium    Dyslipidemia 01/23/2024     Priority: Medium    Dysplastic nevus 01/23/2024     Priority: Medium    DJD (degenerative joint disease) of knee 01/23/2024     Priority: Medium    Transplant donor evaluation 11/21/2023     Priority: Medium      Past Medical History:   Diagnosis Date    DJD (degenerative joint disease) of knee     Dyslipidemia     Dysplastic nevus     Urinary tract infection      Past Surgical History:   Procedure Laterality Date    LAPAROSCOPIC DONOR HAND ASSISTED KIDNEY NON-DIRECTED Right  "5/14/2024    Procedure: Laparoscopic Hand Assisted (Anonymous) Living Non-Directed Kidney Donor;  Surgeon: Kelly Barker MD;  Location: [facility] OR    NO HISTORY OF SURGERY       Current Outpatient Medications   Medication Sig Dispense Refill    acetaminophen (TYLENOL) 325 MG tablet Take 2 tablets (650 mg) by mouth every 6 hours as needed for mild pain or fever (Patient not taking: Reported on 7/1/2024) 60 tablet 0    methocarbamol (ROBAXIN) 500 MG tablet Take 1 tablet (500 mg) by mouth 4 times daily as needed for muscle spasms 12 tablet 0    methocarbamol (ROBAXIN) 500 MG tablet Take 1 tablet (500 mg) by mouth 4 times daily as needed for muscle spasms 8 tablet 0    ondansetron (ZOFRAN ODT) 4 MG ODT tab Take 1 tablet (4 mg) by mouth every 8 hours as needed for nausea (Patient not taking: Reported on 5/23/2024) 15 tablet 0    oxyCODONE (ROXICODONE) 5 MG tablet Take 1 tablet (5 mg) by mouth every 6 hours as needed for severe pain or moderate to severe pain 12 tablet 0    senna-docusate (SENOKOT-S/PERICOLACE) 8.6-50 MG tablet Take 1 tablet by mouth 2 times daily (Patient not taking: Reported on 7/1/2024) 30 tablet 0    sulfamethoxazole-trimethoprim (BACTRIM) 400-80 MG tablet Take 1 tablet by mouth 2 times daily 14 tablet 0       Allergies   Allergen Reactions    Sertraline Unknown     \"Didn't work, felt more depressed\"        Social History     Tobacco Use    Smoking status: Never    Smokeless tobacco: Never   Substance Use Topics    Alcohol use: Never     Family History   Problem Relation Age of Onset    Breast Cancer Mother     Diabetes Type 2  Father     Hypertension Father     Coronary Artery Disease Father     Kidney Disease Father      History   Drug Use Unknown             Review of Systems  Constitutional, neuro, ENT, endocrine, pulmonary, cardiac, gastrointestinal, genitourinary, musculoskeletal, integument and psychiatric systems are negative, except as otherwise noted.    Objective    BP (!) 144/92   Pulse 76 " "  Temp 98.3  F (36.8  C) (Oral)   Wt 75.9 kg (167 lb 4.8 oz)   SpO2 98%   BMI 28.21 kg/m     Estimated body mass index is 28.21 kg/m  as calculated from the following:    Height as of 5/14/24: 1.64 m (5' 4.57\").    Weight as of this encounter: 75.9 kg (167 lb 4.8 oz).  Physical Exam  GENERAL: alert and tearful   EYES: Eyes grossly normal to inspection, PERRL and conjunctivae and sclerae normal  HENT: nose and mouth without ulcers or lesions  NECK: no adenopathy, no asymmetry, masses, or scars  RESP: lungs clear to auscultation - no rales, rhonchi or wheezes  CV: regular rate and rhythm, normal S1 S2, no S3 or S4, no murmur, click or rub, no peripheral edema  ABDOMEN: soft, nontender, no hepatosplenomegaly, no masses and bowel sounds normal. R lower abdomen with 3mm opening with red wound bed. No tunneling. No drainage. No surrounding erythema or induration. Midline lap sites well healed. Upper midline with keloid scarring but no erythema.   MS: no gross musculoskeletal defects noted, no edema  SKIN: no rashes  NEURO: Normal strength and tone, mentation intact and speech normal      Recent Labs   Lab Test 08/05/24  1134 07/01/24  1520 06/17/24  1545 06/10/24  1358 05/06/24  1208 01/18/24  0659   HGB 14.8  --   --  13.7   < > 15.1     --   --  295   < > 233   INR 1.00  --   --   --   --  1.13     --   --  139   < > 140   POTASSIUM 4.2  --   --  4.1   < > 3.8   CR 1.20* 1.18*   < > 1.17*   < > 0.77   A1C  --   --   --   --   --  5.5    < > = values in this interval not displayed.        Diagnostics  Recent Results (from the past 24 hour(s))   Blood Group A Subtype    Collection Time: 08/05/24 11:34 AM   Result Value Ref Range    A1 Antigen Type Positive     SPECIMEN EXPIRATION DATE [id number]    hCG Quantitative Pregnancy    Collection Time: 08/05/24 11:34 AM   Result Value Ref Range    hCG Quantitative 2 <5 mIU/mL   INR    Collection Time: 08/05/24 11:34 AM   Result Value Ref Range    INR 1.00 " 0.85 - 1.15   Basic metabolic panel    Collection Time: 08/05/24 11:34 AM   Result Value Ref Range    Sodium 141 135 - 145 mmol/L    Potassium 4.2 3.4 - 5.3 mmol/L    Chloride 104 98 - 107 mmol/L    Carbon Dioxide (CO2) 28 22 - 29 mmol/L    Anion Gap 9 7 - 15 mmol/L    Urea Nitrogen 18.9 6.0 - 20.0 mg/dL    Creatinine 1.20 (H) 0.51 - 0.95 mg/dL    GFR Estimate 54 (L) >60 mL/min/1.73m2    Calcium 9.4 8.8 - 10.4 mg/dL    Glucose 93 70 - 99 mg/dL   CBC with platelets and differential    Collection Time: 08/05/24 11:34 AM   Result Value Ref Range    WBC Count 5.3 4.0 - 11.0 10e3/uL    RBC Count 4.92 3.80 - 5.20 10e6/uL    Hemoglobin 14.8 11.7 - 15.7 g/dL    Hematocrit 45.1 35.0 - 47.0 %    MCV 92 78 - 100 fL    MCH 30.1 26.5 - 33.0 pg    MCHC 32.8 31.5 - 36.5 g/dL    RDW 12.9 10.0 - 15.0 %    Platelet Count 246 150 - 450 10e3/uL    % Neutrophils 74 %    % Lymphocytes 20 %    % Monocytes 5 %    % Eosinophils 1 %    % Basophils 0 %    % Immature Granulocytes 0 %    NRBCs per 100 WBC 0 <1 /100    Absolute Neutrophils 3.9 1.6 - 8.3 10e3/uL    Absolute Lymphocytes 1.0 0.8 - 5.3 10e3/uL    Absolute Monocytes 0.3 0.0 - 1.3 10e3/uL    Absolute Eosinophils 0.1 0.0 - 0.7 10e3/uL    Absolute Basophils 0.0 0.0 - 0.2 10e3/uL    Absolute Immature Granulocytes 0.0 <=0.4 10e3/uL    Absolute NRBCs 0.0 10e3/uL   EKG 12-lead complete with read (future)    Collection Time: 08/05/24 11:55 AM   Result Value Ref Range    Systolic Blood Pressure  mmHg    Diastolic Blood Pressure  mmHg    Ventricular Rate 61 BPM    Atrial Rate 61 BPM    MI Interval 178 ms    QRS Duration 74 ms     ms    QTc 432 ms    P Axis 67 degrees    R AXIS 9 degrees    T Axis 23 degrees    Interpretation ECG       Sinus rhythm  Normal ECG  When compared with ECG of 18-Jan-2024 12:39,  No significant change was found     UA with Microscopic reflex to Culture    Collection Time: 08/05/24 12:12 PM    Specimen: Urine, Midstream   Result Value Ref Range    Color Urine  Straw Colorless, Straw, Light Yellow, Yellow    Appearance Urine Clear Clear    Glucose Urine Negative Negative mg/dL    Bilirubin Urine Negative Negative    Ketones Urine Negative Negative mg/dL    Specific Gravity Urine 1.008 1.003 - 1.035    Blood Urine Negative Negative    pH Urine 6.5 5.0 - 7.0    Protein Albumin Urine Negative Negative mg/dL    Urobilinogen Urine Normal Normal, 2.0 mg/dL    Nitrite Urine Negative Negative    Leukocyte Esterase Urine Negative Negative    RBC Urine 1 <=2 /HPF    WBC Urine <1 <=5 /HPF    Squamous Epithelials Urine <1 <=1 /HPF   Protein  random urine    Collection Time: 08/05/24 12:13 PM   Result Value Ref Range    Total Protein Urine mg/dL 6.4   mg/dL    Total Protein Urine mg/mg Creat 0.17 0.00 - 0.20 mg/mg Cr    Creatinine Urine mg/dL 37.9 mg/dL      EKG: Normal Sinus Rhythm  CXR: clear chest 8/5/24    Revised Cardiac Risk Index (RCRI)  The patient has the following serious cardiovascular risks for perioperative complications:   - No serious cardiac risks = 0 points     RCRI Interpretation: 0 points: Class I (very low risk - 0.4% complication rate)         Signed Electronically by: KENNETH Sherman CNP  A copy of this evaluation report is provided to the requesting physician.

## 2024-08-06 ENCOUNTER — ANESTHESIA EVENT (OUTPATIENT)
Dept: SURGERY | Facility: CLINIC | Age: 54
End: 2024-08-06

## 2024-08-07 ENCOUNTER — ANESTHESIA (OUTPATIENT)
Dept: SURGERY | Facility: CLINIC | Age: 54
End: 2024-08-07

## 2024-08-07 ENCOUNTER — HOSPITAL ENCOUNTER (OUTPATIENT)
Facility: CLINIC | Age: 54
Discharge: HOME | End: 2024-08-07
Attending: SURGERY | Admitting: SURGERY
Payer: COMMERCIAL

## 2024-08-07 VITALS
WEIGHT: 165.57 LBS | DIASTOLIC BLOOD PRESSURE: 92 MMHG | HEART RATE: 78 BPM | RESPIRATION RATE: 16 BRPM | BODY MASS INDEX: 27.58 KG/M2 | OXYGEN SATURATION: 97 % | TEMPERATURE: 97.9 F | SYSTOLIC BLOOD PRESSURE: 131 MMHG | HEIGHT: 65 IN

## 2024-08-07 DIAGNOSIS — Z00.5 TRANSPLANT DONOR EVALUATION: Primary | ICD-10-CM

## 2024-08-07 LAB
ATRIAL RATE - MUSE: 61 BPM
DIASTOLIC BLOOD PRESSURE - MUSE: NORMAL MMHG
INTERPRETATION ECG - MUSE: NORMAL
P AXIS - MUSE: 67 DEGREES
PR INTERVAL - MUSE: 178 MS
QRS DURATION - MUSE: 74 MS
QT - MUSE: 430 MS
QTC - MUSE: 432 MS
R AXIS - MUSE: 9 DEGREES
SYSTOLIC BLOOD PRESSURE - MUSE: NORMAL MMHG
T AXIS - MUSE: 23 DEGREES
VENTRICULAR RATE- MUSE: 61 BPM

## 2024-08-07 PROCEDURE — 250N000011 HC RX IP 250 OP 636: Performed by: SURGERY

## 2024-08-07 PROCEDURE — 250N000013 HC RX MED GY IP 250 OP 250 PS 637: Performed by: SURGERY

## 2024-08-07 PROCEDURE — 250N000025 HC SEVOFLURANE, PER MIN: Performed by: SURGERY

## 2024-08-07 PROCEDURE — 49084 PERITONEAL LAVAGE: CPT | Performed by: SURGERY

## 2024-08-07 PROCEDURE — 370N000017 HC ANESTHESIA TECHNICAL FEE, PER MIN: Performed by: SURGERY

## 2024-08-07 PROCEDURE — 49084 PERITONEAL LAVAGE: CPT | Performed by: NURSE ANESTHETIST, CERTIFIED REGISTERED

## 2024-08-07 PROCEDURE — 710N000010 HC RECOVERY PHASE 1, LEVEL 2, PER MIN: Performed by: SURGERY

## 2024-08-07 PROCEDURE — 360N000078 HC SURGERY LEVEL 5, PER MIN: Performed by: SURGERY

## 2024-08-07 PROCEDURE — 258N000003 HC RX IP 258 OP 636: Performed by: NURSE ANESTHETIST, CERTIFIED REGISTERED

## 2024-08-07 PROCEDURE — 999N000141 HC STATISTIC PRE-PROCEDURE NURSING ASSESSMENT: Performed by: SURGERY

## 2024-08-07 PROCEDURE — 250N000011 HC RX IP 250 OP 636

## 2024-08-07 PROCEDURE — 250N000011 HC RX IP 250 OP 636: Performed by: NURSE ANESTHETIST, CERTIFIED REGISTERED

## 2024-08-07 PROCEDURE — 11042 DBRDMT SUBQ TIS 1ST 20SQCM/<: CPT | Mod: 78 | Performed by: SURGERY

## 2024-08-07 PROCEDURE — 710N000012 HC RECOVERY PHASE 2, PER MINUTE: Performed by: SURGERY

## 2024-08-07 PROCEDURE — 250N000009 HC RX 250: Performed by: NURSE ANESTHETIST, CERTIFIED REGISTERED

## 2024-08-07 PROCEDURE — 272N000001 HC OR GENERAL SUPPLY STERILE: Performed by: SURGERY

## 2024-08-07 RX ORDER — OXYCODONE HYDROCHLORIDE 5 MG/1
5 TABLET ORAL EVERY 6 HOURS PRN
Qty: 5 TABLET | Refills: 0 | Status: SHIPPED | OUTPATIENT
Start: 2024-08-07 | End: 2024-08-10

## 2024-08-07 RX ORDER — BUPIVACAINE HYDROCHLORIDE 2.5 MG/ML
INJECTION, SOLUTION INFILTRATION; PERINEURAL PRN
Status: DISCONTINUED | OUTPATIENT
Start: 2024-08-07 | End: 2024-08-07 | Stop reason: HOSPADM

## 2024-08-07 RX ORDER — ONDANSETRON 4 MG/1
4 TABLET, ORALLY DISINTEGRATING ORAL EVERY 30 MIN PRN
Status: DISCONTINUED | OUTPATIENT
Start: 2024-08-07 | End: 2024-08-07 | Stop reason: HOSPADM

## 2024-08-07 RX ORDER — CEFAZOLIN SODIUM/WATER 2 G/20 ML
2 SYRINGE (ML) INTRAVENOUS SEE ADMIN INSTRUCTIONS
Status: DISCONTINUED | OUTPATIENT
Start: 2024-08-07 | End: 2024-08-07 | Stop reason: HOSPADM

## 2024-08-07 RX ORDER — ONDANSETRON 2 MG/ML
4 INJECTION INTRAMUSCULAR; INTRAVENOUS EVERY 30 MIN PRN
Status: DISCONTINUED | OUTPATIENT
Start: 2024-08-07 | End: 2024-08-07 | Stop reason: HOSPADM

## 2024-08-07 RX ORDER — SODIUM CHLORIDE, SODIUM GLUCONATE, SODIUM ACETATE, POTASSIUM CHLORIDE AND MAGNESIUM CHLORIDE 526; 502; 368; 37; 30 MG/100ML; MG/100ML; MG/100ML; MG/100ML; MG/100ML
INJECTION, SOLUTION INTRAVENOUS CONTINUOUS PRN
Status: DISCONTINUED | OUTPATIENT
Start: 2024-08-07 | End: 2024-08-07

## 2024-08-07 RX ORDER — ACETAMINOPHEN 325 MG/1
975 TABLET ORAL ONCE
Status: COMPLETED | OUTPATIENT
Start: 2024-08-07 | End: 2024-08-07

## 2024-08-07 RX ORDER — FENTANYL CITRATE 50 UG/ML
50 INJECTION, SOLUTION INTRAMUSCULAR; INTRAVENOUS EVERY 5 MIN PRN
Status: DISCONTINUED | OUTPATIENT
Start: 2024-08-07 | End: 2024-08-07 | Stop reason: HOSPADM

## 2024-08-07 RX ORDER — FENTANYL CITRATE 50 UG/ML
INJECTION, SOLUTION INTRAMUSCULAR; INTRAVENOUS PRN
Status: DISCONTINUED | OUTPATIENT
Start: 2024-08-07 | End: 2024-08-07

## 2024-08-07 RX ORDER — OXYCODONE HYDROCHLORIDE 5 MG/1
5 TABLET ORAL
Status: COMPLETED | OUTPATIENT
Start: 2024-08-07 | End: 2024-08-07

## 2024-08-07 RX ORDER — NALOXONE HYDROCHLORIDE 0.4 MG/ML
0.1 INJECTION, SOLUTION INTRAMUSCULAR; INTRAVENOUS; SUBCUTANEOUS
Status: DISCONTINUED | OUTPATIENT
Start: 2024-08-07 | End: 2024-08-07 | Stop reason: HOSPADM

## 2024-08-07 RX ORDER — HEPARIN SODIUM 5000 [USP'U]/.5ML
5000 INJECTION, SOLUTION INTRAVENOUS; SUBCUTANEOUS
Status: COMPLETED | OUTPATIENT
Start: 2024-08-07 | End: 2024-08-07

## 2024-08-07 RX ORDER — KETOROLAC TROMETHAMINE 30 MG/ML
15 INJECTION, SOLUTION INTRAMUSCULAR; INTRAVENOUS
Status: DISCONTINUED | OUTPATIENT
Start: 2024-08-07 | End: 2024-08-07 | Stop reason: HOSPADM

## 2024-08-07 RX ORDER — CEFAZOLIN SODIUM/WATER 2 G/20 ML
2 SYRINGE (ML) INTRAVENOUS
Status: COMPLETED | OUTPATIENT
Start: 2024-08-07 | End: 2024-08-07

## 2024-08-07 RX ORDER — HYDROMORPHONE HCL IN WATER/PF 6 MG/30 ML
0.2 PATIENT CONTROLLED ANALGESIA SYRINGE INTRAVENOUS EVERY 5 MIN PRN
Status: DISCONTINUED | OUTPATIENT
Start: 2024-08-07 | End: 2024-08-07 | Stop reason: HOSPADM

## 2024-08-07 RX ORDER — DIMENHYDRINATE 50 MG/ML
25 INJECTION, SOLUTION INTRAMUSCULAR; INTRAVENOUS
Status: DISCONTINUED | OUTPATIENT
Start: 2024-08-07 | End: 2024-08-07 | Stop reason: HOSPADM

## 2024-08-07 RX ORDER — HYDROMORPHONE HCL IN WATER/PF 6 MG/30 ML
0.4 PATIENT CONTROLLED ANALGESIA SYRINGE INTRAVENOUS EVERY 5 MIN PRN
Status: DISCONTINUED | OUTPATIENT
Start: 2024-08-07 | End: 2024-08-07 | Stop reason: HOSPADM

## 2024-08-07 RX ORDER — PROPOFOL 10 MG/ML
INJECTION, EMULSION INTRAVENOUS PRN
Status: DISCONTINUED | OUTPATIENT
Start: 2024-08-07 | End: 2024-08-07

## 2024-08-07 RX ORDER — ONDANSETRON 2 MG/ML
INJECTION INTRAMUSCULAR; INTRAVENOUS PRN
Status: DISCONTINUED | OUTPATIENT
Start: 2024-08-07 | End: 2024-08-07

## 2024-08-07 RX ORDER — FENTANYL CITRATE 50 UG/ML
25 INJECTION, SOLUTION INTRAMUSCULAR; INTRAVENOUS EVERY 5 MIN PRN
Status: DISCONTINUED | OUTPATIENT
Start: 2024-08-07 | End: 2024-08-07 | Stop reason: HOSPADM

## 2024-08-07 RX ORDER — LIDOCAINE HYDROCHLORIDE 20 MG/ML
INJECTION, SOLUTION INFILTRATION; PERINEURAL PRN
Status: DISCONTINUED | OUTPATIENT
Start: 2024-08-07 | End: 2024-08-07

## 2024-08-07 RX ORDER — OXYCODONE HYDROCHLORIDE 10 MG/1
10 TABLET ORAL
Status: DISCONTINUED | OUTPATIENT
Start: 2024-08-07 | End: 2024-08-07 | Stop reason: HOSPADM

## 2024-08-07 RX ORDER — DEXAMETHASONE SODIUM PHOSPHATE 4 MG/ML
INJECTION, SOLUTION INTRA-ARTICULAR; INTRALESIONAL; INTRAMUSCULAR; INTRAVENOUS; SOFT TISSUE PRN
Status: DISCONTINUED | OUTPATIENT
Start: 2024-08-07 | End: 2024-08-07

## 2024-08-07 RX ORDER — SODIUM CHLORIDE, SODIUM LACTATE, POTASSIUM CHLORIDE, CALCIUM CHLORIDE 600; 310; 30; 20 MG/100ML; MG/100ML; MG/100ML; MG/100ML
INJECTION, SOLUTION INTRAVENOUS CONTINUOUS
Status: DISCONTINUED | OUTPATIENT
Start: 2024-08-07 | End: 2024-08-07 | Stop reason: HOSPADM

## 2024-08-07 RX ORDER — METHOCARBAMOL 500 MG/1
500 TABLET, FILM COATED ORAL 4 TIMES DAILY PRN
Qty: 10 TABLET | Refills: 0 | Status: SHIPPED | OUTPATIENT
Start: 2024-08-07

## 2024-08-07 RX ORDER — GLYCOPYRROLATE 0.2 MG/ML
INJECTION, SOLUTION INTRAMUSCULAR; INTRAVENOUS PRN
Status: DISCONTINUED | OUTPATIENT
Start: 2024-08-07 | End: 2024-08-07

## 2024-08-07 RX ADMIN — PHENYLEPHRINE HYDROCHLORIDE 100 MCG: 10 INJECTION INTRAVENOUS at 14:44

## 2024-08-07 RX ADMIN — ONDANSETRON 4 MG: 2 INJECTION INTRAMUSCULAR; INTRAVENOUS at 15:22

## 2024-08-07 RX ADMIN — GLYCOPYRROLATE 0.2 MG: 0.2 INJECTION, SOLUTION INTRAMUSCULAR; INTRAVENOUS at 14:14

## 2024-08-07 RX ADMIN — FENTANYL CITRATE 50 MCG: 50 INJECTION INTRAMUSCULAR; INTRAVENOUS at 14:08

## 2024-08-07 RX ADMIN — PHENYLEPHRINE HYDROCHLORIDE 200 MCG: 10 INJECTION INTRAVENOUS at 14:31

## 2024-08-07 RX ADMIN — PHENYLEPHRINE HYDROCHLORIDE 200 MCG: 10 INJECTION INTRAVENOUS at 14:24

## 2024-08-07 RX ADMIN — DEXAMETHASONE SODIUM PHOSPHATE 8 MG: 4 INJECTION, SOLUTION INTRA-ARTICULAR; INTRALESIONAL; INTRAMUSCULAR; INTRAVENOUS; SOFT TISSUE at 14:01

## 2024-08-07 RX ADMIN — PHENYLEPHRINE HYDROCHLORIDE 100 MCG: 10 INJECTION INTRAVENOUS at 14:18

## 2024-08-07 RX ADMIN — PHENYLEPHRINE HYDROCHLORIDE 100 MCG: 10 INJECTION INTRAVENOUS at 14:00

## 2024-08-07 RX ADMIN — ACETAMINOPHEN 975 MG: 325 TABLET, FILM COATED ORAL at 16:11

## 2024-08-07 RX ADMIN — FENTANYL CITRATE 25 MCG: 50 INJECTION, SOLUTION INTRAMUSCULAR; INTRAVENOUS at 15:27

## 2024-08-07 RX ADMIN — HEPARIN SODIUM 5000 UNITS: 5000 INJECTION, SOLUTION INTRAVENOUS; SUBCUTANEOUS at 12:57

## 2024-08-07 RX ADMIN — FENTANYL CITRATE 50 MCG: 50 INJECTION INTRAMUSCULAR; INTRAVENOUS at 14:35

## 2024-08-07 RX ADMIN — PROPOFOL 150 MG: 10 INJECTION, EMULSION INTRAVENOUS at 13:52

## 2024-08-07 RX ADMIN — MIDAZOLAM 2 MG: 1 INJECTION INTRAMUSCULAR; INTRAVENOUS at 13:42

## 2024-08-07 RX ADMIN — FENTANYL CITRATE 25 MCG: 50 INJECTION, SOLUTION INTRAMUSCULAR; INTRAVENOUS at 15:36

## 2024-08-07 RX ADMIN — FENTANYL CITRATE 50 MCG: 50 INJECTION INTRAMUSCULAR; INTRAVENOUS at 13:49

## 2024-08-07 RX ADMIN — OXYCODONE HYDROCHLORIDE 5 MG: 5 TABLET ORAL at 17:17

## 2024-08-07 RX ADMIN — Medication 2 G: at 13:51

## 2024-08-07 RX ADMIN — LIDOCAINE HYDROCHLORIDE 100 MG: 20 INJECTION, SOLUTION INFILTRATION; PERINEURAL at 13:51

## 2024-08-07 RX ADMIN — FENTANYL CITRATE 50 MCG: 50 INJECTION INTRAMUSCULAR; INTRAVENOUS at 14:39

## 2024-08-07 RX ADMIN — SODIUM CHLORIDE, SODIUM GLUCONATE, SODIUM ACETATE, POTASSIUM CHLORIDE AND MAGNESIUM CHLORIDE: 526; 502; 368; 37; 30 INJECTION, SOLUTION INTRAVENOUS at 13:42

## 2024-08-07 RX ADMIN — GLYCOPYRROLATE 0.2 MG: 0.2 INJECTION, SOLUTION INTRAMUSCULAR; INTRAVENOUS at 14:45

## 2024-08-07 RX ADMIN — ONDANSETRON 4 MG: 2 INJECTION INTRAMUSCULAR; INTRAVENOUS at 13:59

## 2024-08-07 ASSESSMENT — ENCOUNTER SYMPTOMS: SEIZURES: 0

## 2024-08-07 ASSESSMENT — ACTIVITIES OF DAILY LIVING (ADL)
ADLS_ACUITY_SCORE: 35

## 2024-08-07 NOTE — OP NOTE
OPERATIVE REPORT     DATE OF OPERATION: August 7, 2024      NAME: Jazzy Agosto     MRN: [id number]     PREOPERATIVE DIAGNOSIS:  Suture abscess      POSTOPERATIVE DIAGNOSIS:  same     OPERATION PERFORMED:  Wound debridement, washout       ATTENDING SURGEON:  MD Mj     ASSISTANT:  Jamari Moeller MD Fellow     ANESTHESIA:  LMA     INDICATION: Jazzy Agosto is a 54 year old year old who previously donated a kidney in May 2024. Her right abdominal port site has had a persistent purulent drainage, likely from a suture abscess. She has failed conservative management with packing. We elected to bring her to the OR for debridement. This was discussed with the patient and she agreed     FINDINGS: Likely suture abscess with fistulous tract. No purulence.      DESCRIPTION OF OPERATION:  The patient was placed on the operating room table in the supine position. General anasthesia was induced. The abdomen area was prepped and draped in the usual sterile manner. An ellipse incision was made around the right sided port site with persistent drainage. This was dissected down approximately through 2 cm of subcutaneous tissue where we identified a fistulous tract likely secondary to a suture abscess. This was removed and sent as a specimen.     We irrigated the wound throroughly. We then closed in multiple layers including 3'0 vicryl and 4'0 monocryl. Dermabond was used for the skin.     The patient tolerated the procedure well and was extubated and taken back to PACU.      Jamari Moeller MD  Transplant Surgery Fellow    I was present during the key portions of the procedure, and I was immediately available for the entire procedure. There was no qualified resident available to assist with the entire procedure. The fellow noted Dr Moeller  participated as the first assistant in all parts of the dictated procedure and was the primary in the opening and closure with assistance from me as needed.            
none

## 2024-08-07 NOTE — ANESTHESIA PREPROCEDURE EVALUATION
"Anesthesia Pre-Procedure Evaluation    Patient: Jazzy Agosto   MRN: [id number] : 1970        Procedure : Procedure(s):  Irrigation and debridement abdomen washout, combined          Past Medical History:   Diagnosis Date    DJD (degenerative joint disease) of knee     Dyslipidemia     Dysplastic nevus     Urinary tract infection       Past Surgical History:   Procedure Laterality Date    LAPAROSCOPIC DONOR HAND ASSISTED KIDNEY NON-DIRECTED Right 2024    Procedure: Laparoscopic Hand Assisted (Anonymous) Living Non-Directed Kidney Donor;  Surgeon: Kelly Barker MD;  Location: [facility]    NO HISTORY OF SURGERY        Allergies   Allergen Reactions    Sertraline Unknown     \"Didn't work, felt more depressed\"      Social History     Tobacco Use    Smoking status: Never    Smokeless tobacco: Never   Substance Use Topics    Alcohol use: Never      Wt Readings from Last 1 Encounters:   24 75.1 kg (165 lb 9.1 oz)        Anesthesia Evaluation            ROS/MED HX  ENT/Pulmonary:    (-) asthma and sleep apnea   Neurologic:    (-) no seizures and no CVA   Cardiovascular:    (-) hypertension, CAD and dyslipidemia   METS/Exercise Tolerance:     Hematologic:    (-) anemia   Musculoskeletal:       GI/Hepatic:    (-) GERD   Renal/Genitourinary: Comment: Donated kidney three months ago with slight increase in Cr. Small wound infection    (+) renal disease,  Pt does not require dialysis,           Endo:    (-) Type II DM and thyroid disease   Psychiatric/Substance Use:       Infectious Disease:       Malignancy:       Other: Comment: Pregnancy test negative on     (-) Any chance pregnant       Physical Exam    Airway        Mallampati: II   TM distance: > 3 FB   Neck ROM: full   Mouth opening: > 3 cm    Respiratory Devices and Support         Dental       (+) Completely normal teeth      Cardiovascular   cardiovascular exam normal          Pulmonary   pulmonary exam normal                OUTSIDE " "LABS:  CBC:   Lab Results   Component Value Date    WBC 5.3 08/05/2024    WBC 8.3 06/10/2024    HGB 14.8 08/05/2024    HGB 13.7 06/10/2024    HCT 45.1 08/05/2024    HCT 42.1 06/10/2024     08/05/2024     06/10/2024     BMP:   Lab Results   Component Value Date     08/05/2024     06/10/2024    POTASSIUM 4.2 08/05/2024    POTASSIUM 4.1 06/10/2024    CHLORIDE 104 08/05/2024    CHLORIDE 101 06/10/2024    CO2 28 08/05/2024    CO2 28 06/10/2024    BUN 18.9 08/05/2024    BUN 17.0 06/10/2024    CR 1.20 (H) 08/05/2024    CR 1.18 (H) 07/01/2024    GLC 93 08/05/2024    GLC 90 06/10/2024     COAGS:   Lab Results   Component Value Date    PTT 27 01/18/2024    INR 1.00 08/05/2024     POC: No results found for: \"BGM\", \"HCG\", \"HCGS\"  HEPATIC:   Lab Results   Component Value Date    ALBUMIN 4.2 07/01/2024    PROTTOTAL 7.3 07/01/2024    ALT 14 07/01/2024    AST 17 07/01/2024    ALKPHOS 120 07/01/2024    BILITOTAL 0.2 07/01/2024     OTHER:   Lab Results   Component Value Date    A1C 5.5 01/18/2024    STU 9.4 08/05/2024    PHOS 3.4 01/18/2024       Anesthesia Plan    ASA Status:  2    NPO Status:  NPO Appropriate    Anesthesia Type: General.   Induction: Intravenous.   Maintenance: Inhalation.   Techniques and Equipment:     - Airway: Video-Laryngoscope       Consents    Anesthesia Plan(s) and associated risks, benefits, and realistic alternatives discussed. Questions answered and patient/representative(s) expressed understanding.     - Discussed: Risks, Benefits and Alternatives for BOTH SEDATION and the PROCEDURE were discussed     - Discussed with:  Patient      - Extended Intubation/Ventilatory Support Discussed: No.      - Patient is DNR/DNI Status: No     Use of blood products discussed: No .     Postoperative Care    Pain management: IV analgesics, Oral pain medications, Multi-modal analgesia.   PONV prophylaxis: Ondansetron (or other 5HT-3), Dexamethasone or Solumedrol     Comments:    Other Comments: " "LMA vs ETT depending on surgical staff discussion in the OR.           Marc Snow MD    I have reviewed the pertinent notes and labs in the chart from the past 30 days and (re)examined the patient.  Any updates or changes from those notes are reflected in this note.              # Overweight: Estimated body mass index is 27.55 kg/m  as calculated from the following:    Height as of this encounter: 1.651 m (5' 5\").    Weight as of this encounter: 75.1 kg (165 lb 9.1 oz).      "

## 2024-08-07 NOTE — ANESTHESIA PROCEDURE NOTES
Airway       Patient location during procedure: OR  Staff -        CRNA: Schlatter, Charles Patrick, APRN CRNA       Performed By: CRNA  Consent for Airway        Urgency: elective  Indications and Patient Condition       Indications for airway management: mary-procedural       Induction type:intravenous       Mask difficulty assessment: 0 - not attempted    Final Airway Details       Final airway type: supraglottic airway    Supraglottic Airway Details        Type: LMA       Brand: I-Gel       LMA size: 4    Post intubation assessment        Placement verified by: capnometry, equal breath sounds and chest rise        Number of attempts at approach: 1       Number of other approaches attempted: 0       Ease of procedure: easy       Dentition: Intact

## 2024-08-07 NOTE — H&P
[facility]    History and Physical - Transplant Surgery Service       Date of Admission:  8/7/2024    Assessment & Plan: Surgery   Jazzy Agosto is a 54 year old female who underwent laparoscopic hand assisted kidney donation in May of 2024 now presenting for persistent incisional drainage and low grade fevers.     - NPO  - OR today for washout   - Possible admission pending intra-op course     The patient's care was discussed with the  fellow Dr. Kaye who will discuss with staff .    Desean Spangler MD  [facility]  Non-urgent messages: Securely message with [employer] (more info)  Text page via [facility] Paging/Directory     ______________________________________________________________________    Chief Complaint   Incisional debridement    History is obtained from the patient and per chart review,     History of Present Illness   Jazzy Agosto is a 54 year old female who underwent laparoscopic hand assisted kidney donation in May of 2024. Since surgery, she has experienced drainage from her wound with intermittent low grade fevers. She was last seen in clinic on 7/22/24 at which time she underwent local wound exploration and packing in the context of purulent drainage. She was instructed to pack the wound daily.     She now presented for local wound washout of her incision. States she has continued to have intermittent low grade fevers since wound was opened and packed. Denies nausea, vomiting, abdominal pain, pain with urination or difficulty with urination. Making good urine otherwise.     Last labs two days ago notable for persistent WINSTON w/ Cr 1.20. WBC 5.3, Hgb 14.8, Plt 246.     Past Medical History    Past Medical History:   Diagnosis Date    DJD (degenerative joint disease) of knee     Dyslipidemia     Dysplastic nevus     Urinary tract infection        Past Surgical History   Past Surgical History:    Procedure Laterality Date    LAPAROSCOPIC DONOR HAND ASSISTED KIDNEY NON-DIRECTED Right 5/14/2024    Procedure: Laparoscopic Hand Assisted (Anonymous) Living Non-Directed Kidney Donor;  Surgeon: Kelly Barker MD;  Location: [facility] OR    NO HISTORY OF SURGERY         Prior to Admission Medications   Prior to Admission Medications   Prescriptions Last Dose Informant Patient Reported? Taking?   acetaminophen (TYLENOL) 325 MG tablet   No No   Sig: Take 2 tablets (650 mg) by mouth every 6 hours as needed for mild pain or fever   Patient not taking: Reported on 7/1/2024   methocarbamol (ROBAXIN) 500 MG tablet   No No   Sig: Take 1 tablet (500 mg) by mouth 4 times daily as needed for muscle spasms   methocarbamol (ROBAXIN) 500 MG tablet   No No   Sig: Take 1 tablet (500 mg) by mouth 4 times daily as needed for muscle spasms   ondansetron (ZOFRAN ODT) 4 MG ODT tab   No No   Sig: Take 1 tablet (4 mg) by mouth every 8 hours as needed for nausea   Patient not taking: Reported on 5/23/2024   oxyCODONE (ROXICODONE) 5 MG tablet   No No   Sig: Take 1 tablet (5 mg) by mouth every 6 hours as needed for severe pain or moderate to severe pain   senna-docusate (SENOKOT-S/PERICOLACE) 8.6-50 MG tablet   No No   Sig: Take 1 tablet by mouth 2 times daily   Patient not taking: Reported on 7/1/2024   sulfamethoxazole-trimethoprim (BACTRIM) 400-80 MG tablet   No No   Sig: Take 1 tablet by mouth 2 times daily      Facility-Administered Medications: None        Review of Systems    The 10 point Review of Systems is negative other than noted in the HPI or here.      Physical Exam   Vital Signs: Temp: 98.4  F (36.9  C) Temp src: Oral BP: (!) 137/90 Pulse: 70   Resp: 16   O2 Device: None (Room air)    Weight: 165 lbs 9.05 ozNo intake or output data in the 24 hours ending 08/07/24 1221  Constitutional: awake, alert, cooperative, no apparent distress, and appears stated age  Eyes: Lids and lashes normal, pupils equal, round and reactive to light,  extra ocular muscles intact, sclera clear, conjunctiva normal  Respiratory: No increased work of breathing on room air   Cardiovascular: RRR, normotensive   GI: abdomen soft, non distended, mildly tender around right sided lap incision site. No surrounding erythema or drainage. Midline incision well healed.   Skin: no bruising or bleeding and normal skin color, texture, turgor  Musculoskeletal: no lower extremity pitting edema present  full range of motion noted  Neurologic: Awake, alert, oriented to name, place and time.  Cranial nerves II-XII are grossly intact.       Data   Most Recent 3 CBC's:  Recent Labs   Lab Test 08/05/24  1134 06/10/24  1358 05/29/24  1218   WBC 5.3 8.3 7.7   HGB 14.8 13.7 13.7   MCV 92 92 93    295 321     Most Recent 3 BMP's:  Recent Labs   Lab Test 08/05/24  1134 07/01/24  1520 06/17/24  1545 06/10/24  1358 05/29/24  1218     --   --  139 140   POTASSIUM 4.2  --   --  4.1 4.5   CHLORIDE 104  --   --  101 105   CO2 28  --   --  28 28   BUN 18.9  --   --  17.0 16.1   CR 1.20* 1.18* 1.51* 1.17* 1.16*   ANIONGAP 9  --   --  10 7   STU 9.4  --   --  9.6 9.0   GLC 93  --   --  90 99     Most Recent 2 LFT's:  Recent Labs   Lab Test 07/01/24  1520 06/17/24  1545   AST 17 19   ALT 14 73*   ALKPHOS 120 172*   BILITOTAL 0.2 <0.2     Most Recent 3 INR's:  Recent Labs   Lab Test 08/05/24  1134 01/18/24  0659   INR 1.00 1.13     Most Recent ESR & CRP:No lab results found.  I have reviewed history, examined patient and discussed plan with the fellow/resident/TIMOTEO.    I concur with the findings in this note.    Time spent on admission activities: 45 minutes.

## 2024-08-07 NOTE — ANESTHESIA POSTPROCEDURE EVALUATION
Patient: Jazzy Agosto    Procedure: Procedure(s):  Irrigation and debridement abdomen washout, combined       Anesthesia Type:  General    Note:  Disposition: Outpatient   Postop Pain Control: Uneventful            Sign Out: Well controlled pain   PONV: No   Neuro/Psych:             Sign Out: Acceptable/Baseline neuro status   Airway/Respiratory:             Sign Out: Acceptable/Baseline resp. status   CV/Hemodynamics:             Sign Out: Acceptable CV status   Other NRE:    DID A NON-ROUTINE EVENT OCCUR? No           Last vitals:  Vitals Value Taken Time   /91 08/07/24 1549   Temp 36.5  C (97.7  F) 08/07/24 1455   Pulse 91 08/07/24 1551   Resp 21 08/07/24 1551   SpO2 96 % 08/07/24 1551   Vitals shown include unfiled device data.    Electronically Signed By: Marc Snow MD  August 7, 2024  3:52 PM

## 2024-08-07 NOTE — DISCHARGE INSTRUCTIONS
Contacting your Doctor -   To contact a doctor, call [facility] at [phone]  or:  [phone] and ask for the resident on call for Transplant (answered 24 hours a day)   Emergency Department:  [facility]: [phone]  [facility]: [phone] if you are in need of immediate or emergent help

## 2024-08-07 NOTE — BRIEF OP NOTE
[facility]    Brief Operative Note    Pre-operative diagnosis: Abscess of peritoneum (H) [K65.1]  Post-operative diagnosis Same as pre-operative diagnosis    Procedure: Irrigation and debridement abdomen washout, combined, N/A - Abdomen    Surgeon: Surgeons and Role:     * Kelly Barker MD - Primary     * Daniel Moeller MD - Assisting     * Desean Spangler MD - Resident - Assisting     * Rigoberto Gu MD - Resident - Assisting  Anesthesia: General   Estimated Blood Loss: Minimal    Drains: None  Specimens:   ID Type Source Tests Collected by Time Destination   1 : Abdominal wall abscess Abscess Other SURGICAL PATHOLOGY EXAM Kelly Barker MD 8/7/2024  2:22 PM      Findings:   Abdominal wall incisional abscess pocket and sinus tract core out and primary closure.  Complications: None.  Implants: * No implants in log *    Plan:   - Ok to eat   - Multimodal pain control   - Ok to discharge from PACU once meets criteria    Desean Spangler MD PGY3  General Surgery Resident

## 2024-08-07 NOTE — ANESTHESIA CARE TRANSFER NOTE
Patient: Jazzy Agosto    Procedure: Procedure(s):  Irrigation and debridement abdomen washout, combined       Diagnosis: Abscess of peritoneum (H) [K65.1]  Diagnosis Additional Information: No value filed.    Anesthesia Type:   General     Note:    Oropharynx: oropharynx clear of all foreign objects and spontaneously breathing  Level of Consciousness: awake  Oxygen Supplementation: face mask    Independent Airway: airway patency satisfactory and stable  Dentition: dentition unchanged  Vital Signs Stable: post-procedure vital signs reviewed and stable  Report to RN Given: handoff report given  Patient transferred to: PACU    Handoff Report: Identifed the Patient, Identified the Reponsible Provider, Reviewed the pertinent medical history, Discussed the surgical course, Reviewed Intra-OP anesthesia mangement and issues during anesthesia, Set expectations for post-procedure period and Allowed opportunity for questions and acknowledgement of understanding      Vitals:  Vitals Value Taken Time   /75    Temp 36.7    Pulse 99    Resp 12    SpO2 99        Electronically Signed By: Charles Patrick Schlatter, APRN CRNA  August 7, 2024  2:58 PM  
no

## 2024-08-08 ENCOUNTER — TELEPHONE (OUTPATIENT)
Dept: TRANSPLANT | Facility: CLINIC | Age: 54
End: 2024-08-08
Payer: COMMERCIAL

## 2024-08-08 NOTE — TELEPHONE ENCOUNTER
Writer calling to check in on Jazzy day after wound debridement procedure. Jazzy reports feeling okay today. Reviewed restrictions- 10lbs for two weeks and to see back in clinic on Monday. Let Jazzy know to reach out if she needs anything in the meantime. Jazzy verbalized understanding and is in agreement with plan.

## 2024-08-12 ENCOUNTER — OFFICE VISIT (OUTPATIENT)
Dept: TRANSPLANT | Facility: CLINIC | Age: 54
End: 2024-08-12
Attending: SURGERY

## 2024-08-12 VITALS
HEART RATE: 70 BPM | OXYGEN SATURATION: 98 % | SYSTOLIC BLOOD PRESSURE: 125 MMHG | DIASTOLIC BLOOD PRESSURE: 84 MMHG | BODY MASS INDEX: 27.61 KG/M2 | WEIGHT: 165.9 LBS

## 2024-08-12 DIAGNOSIS — Z52.4 KIDNEY DONOR: Primary | ICD-10-CM

## 2024-08-12 PROCEDURE — 99024 POSTOP FOLLOW-UP VISIT: CPT

## 2024-08-12 PROCEDURE — 99211 OFF/OP EST MAY X REQ PHY/QHP: CPT

## 2024-08-12 NOTE — LETTER
8/12/2024      Jazzy Agosto  [id number] [address] [facility] [id number]      Dear Colleague,    Thank you for referring your patient, Jazzy Agosto, to the [facility]. Please see a copy of my visit note below.    Here for post op check after having wound debridement and washout on 8/7.   She is doing well. No wound issues.   On exam, the wound is clean dry intact with dermabond. No signs of infection or breakdown . Healing well.   Follow up per donor protocol and prn.       Again, thank you for allowing me to participate in the care of your patient.        Sincerely,         [facility] SURG FELLOW 1

## 2024-08-14 LAB
PATH REPORT.COMMENTS IMP SPEC: NORMAL
PATH REPORT.COMMENTS IMP SPEC: NORMAL
PATH REPORT.FINAL DX SPEC: NORMAL
PATH REPORT.GROSS SPEC: NORMAL
PATH REPORT.MICROSCOPIC SPEC OTHER STN: NORMAL
PATH REPORT.RELEVANT HX SPEC: NORMAL
PHOTO IMAGE: NORMAL

## 2024-08-19 NOTE — PROGRESS NOTES
Here for post op check after having wound debridement and washout on 8/7.   She is doing well. No wound issues.   On exam, the wound is clean dry intact with dermabond. No signs of infection or breakdown . Healing well.   Follow up per donor protocol and prn.

## 2024-10-28 ENCOUNTER — DOCUMENTATION ONLY (OUTPATIENT)
Dept: TRANSPLANT | Facility: CLINIC | Age: 54
End: 2024-10-28
Payer: COMMERCIAL

## 2024-10-28 DIAGNOSIS — Z52.4 KIDNEY DONOR: Primary | ICD-10-CM

## 2024-10-28 NOTE — PROGRESS NOTES
Sent via Email to Diane her PostOp lab orders with instructions. Orders in Epic.To contact me if questions.

## 2024-11-11 ENCOUNTER — LAB (OUTPATIENT)
Dept: LAB | Facility: CLINIC | Age: 54
End: 2024-11-11
Payer: COMMERCIAL

## 2024-11-11 DIAGNOSIS — Z52.4 KIDNEY DONOR: ICD-10-CM

## 2024-11-11 LAB
ALBUMIN MFR UR ELPH: <6 MG/DL
ALBUMIN UR-MCNC: NEGATIVE MG/DL
APPEARANCE UR: CLEAR
BILIRUB UR QL STRIP: NEGATIVE
COLOR UR AUTO: YELLOW
CREAT UR-MCNC: 19.4 MG/DL
CREAT UR-MCNC: 19.4 MG/DL
GLUCOSE UR STRIP-MCNC: NEGATIVE MG/DL
HGB UR QL STRIP: NEGATIVE
KETONES UR STRIP-MCNC: NEGATIVE MG/DL
LEUKOCYTE ESTERASE UR QL STRIP: NEGATIVE
MICROALBUMIN UR-MCNC: <12 MG/L
MICROALBUMIN/CREAT UR: NORMAL MG/G{CREAT}
NITRATE UR QL: NEGATIVE
PH UR STRIP: 6 [PH] (ref 5–7)
PROT/CREAT 24H UR: NORMAL MG/G{CREAT}
SP GR UR STRIP: 1.01 (ref 1–1.03)
UROBILINOGEN UR STRIP-ACNC: 0.2 E.U./DL

## 2024-11-11 PROCEDURE — 36415 COLL VENOUS BLD VENIPUNCTURE: CPT

## 2024-11-11 PROCEDURE — 82570 ASSAY OF URINE CREATININE: CPT

## 2024-11-11 PROCEDURE — 82565 ASSAY OF CREATININE: CPT

## 2024-11-11 PROCEDURE — 81003 URINALYSIS AUTO W/O SCOPE: CPT

## 2024-11-11 PROCEDURE — 84156 ASSAY OF PROTEIN URINE: CPT

## 2024-11-11 PROCEDURE — 82043 UR ALBUMIN QUANTITATIVE: CPT

## 2024-11-12 DIAGNOSIS — Z52.4 KIDNEY DONOR: Primary | ICD-10-CM

## 2024-11-12 LAB
CREAT SERPL-MCNC: 1.08 MG/DL (ref 0.51–0.95)
EGFRCR SERPLBLD CKD-EPI 2021: 61 ML/MIN/1.73M2

## 2025-01-19 ENCOUNTER — HEALTH MAINTENANCE LETTER (OUTPATIENT)
Age: 55
End: 2025-01-19

## 2025-05-13 ENCOUNTER — LAB (OUTPATIENT)
Dept: LAB | Facility: CLINIC | Age: 55
End: 2025-05-13

## 2025-05-13 DIAGNOSIS — Z52.4 KIDNEY DONOR: ICD-10-CM

## 2025-05-13 LAB
ALBUMIN UR-MCNC: ABNORMAL MG/DL
APPEARANCE UR: CLEAR
BILIRUB UR QL STRIP: NEGATIVE
COLOR UR AUTO: YELLOW
GLUCOSE UR STRIP-MCNC: NEGATIVE MG/DL
HGB UR QL STRIP: ABNORMAL
KETONES UR STRIP-MCNC: NEGATIVE MG/DL
LEUKOCYTE ESTERASE UR QL STRIP: NEGATIVE
NITRATE UR QL: NEGATIVE
PH UR STRIP: 5.5 [PH] (ref 5–7)
RBC #/AREA URNS AUTO: ABNORMAL /HPF
SP GR UR STRIP: 1.02 (ref 1–1.03)
SQUAMOUS #/AREA URNS AUTO: ABNORMAL /LPF
UROBILINOGEN UR STRIP-ACNC: 0.2 E.U./DL
WBC #/AREA URNS AUTO: ABNORMAL /HPF

## 2025-05-13 PROCEDURE — 81001 URINALYSIS AUTO W/SCOPE: CPT

## 2025-05-13 PROCEDURE — 82570 ASSAY OF URINE CREATININE: CPT

## 2025-05-13 PROCEDURE — 82565 ASSAY OF CREATININE: CPT

## 2025-05-13 PROCEDURE — 36415 COLL VENOUS BLD VENIPUNCTURE: CPT

## 2025-05-13 PROCEDURE — 84156 ASSAY OF PROTEIN URINE: CPT

## 2025-05-13 PROCEDURE — 82043 UR ALBUMIN QUANTITATIVE: CPT

## 2025-05-14 LAB
ALBUMIN MFR UR ELPH: 16 MG/DL
CREAT SERPL-MCNC: 1.19 MG/DL (ref 0.51–0.95)
CREAT UR-MCNC: 155.1 MG/DL
CREAT UR-MCNC: 155.1 MG/DL
EGFRCR SERPLBLD CKD-EPI 2021: 54 ML/MIN/1.73M2
MICROALBUMIN UR-MCNC: 14.6 MG/L
MICROALBUMIN/CREAT UR: 9.41 MG/G CR (ref 0–25)
PROT/CREAT 24H UR: 0.1 MG/MG CR (ref 0–0.2)

## (undated) DEVICE — SU DERMABOND ADVANCED .7ML DNX12

## (undated) DEVICE — ADPT 5 IN 1 360

## (undated) DEVICE — SOL WATER IRRIG 1000ML BOTTLE 2F7114

## (undated) DEVICE — STPL POWERED ECHELON VASC 35MM PVE35A

## (undated) DEVICE — CLIP ENDO HEMO-LOC PURPLE LG 544240

## (undated) DEVICE — BASIN SET SINGLE STERILE 13752-624

## (undated) DEVICE — SU SILK 3-0 TIE 12X30" A304H

## (undated) DEVICE — PREP CHLORAPREP 26ML TINTED HI-LITE ORANGE 930815

## (undated) DEVICE — ENDO TROCAR FIRST ENTRY KII FIOS Z-THRD 12X100MM CTF73

## (undated) DEVICE — DRAPE FLUID WARMING 52 X 60" ORS-321

## (undated) DEVICE — GLOVE BIOGEL PI MICRO SZ 7.5 48575

## (undated) DEVICE — SU SILK 4-0 TIE 12X30" A303H

## (undated) DEVICE — ENDO TROCAR SLEEVE KII Z-THREADED 12X100MM CTS22

## (undated) DEVICE — Device

## (undated) DEVICE — STRAP UNIVERSAL POSITIONING 2-PIECE 4X47X76" 91-287

## (undated) DEVICE — LINEN GOWN XLG 5407

## (undated) DEVICE — SU VICRYL 0 TIE 54" J608H

## (undated) DEVICE — DRAPE IOBAN INCISE 23X17" 6650EZ

## (undated) DEVICE — SU VICRYL+ 3-0 27IN SH UND VCP416H

## (undated) DEVICE — SURGICEL ABSORBABLE HEMOSTAT SNOW 4"X4" 2083

## (undated) DEVICE — ENDO TROCAR FIRST ENTRY KII FIOS Z-THRD 05X100MM CTF03

## (undated) DEVICE — ESU GROUND PAD ADULT W/CORD E7507

## (undated) DEVICE — LINEN TOWEL PACK X6 WHITE 5487

## (undated) DEVICE — SU SILK 2-0 TIE 12X30" A305H

## (undated) DEVICE — ESU ENDO SCISSORS 5MM CVD 5DCS

## (undated) DEVICE — WIPES FOLEY CARE SURESTEP PROVON DFC100

## (undated) DEVICE — LINEN TOWEL PACK X5 5464

## (undated) DEVICE — SU PDS II 0 TP-1 60" Z991G

## (undated) DEVICE — TUBING IRRIG CYSTO/BLADDER SET 81" LF 2C4040

## (undated) DEVICE — DEVICE SUTURE GRASPER TROCAR CLOSURE 14GA PMITCSG

## (undated) DEVICE — SU MONOCRYL 4-0 PS-2 27" UND Y426H

## (undated) DEVICE — ENDO GELPORT 100/120MM C8XX2

## (undated) DEVICE — SOL WATER 10ML VIAL 6332318510

## (undated) DEVICE — TUBING SMOKE EVAC PNEUMOCLEAR HEATED 0620050350

## (undated) DEVICE — CATH TRAY FOLEY SURESTEP 16FR W/URNE MTR STLK LATEX A303316A

## (undated) DEVICE — TIP CAUTERY L HOOK 36CM E377336C

## (undated) DEVICE — ANTIFOG SOLUTION W/FOAM PAD 31142527

## (undated) DEVICE — SHEARS HARMONIC ULTRASONIC LAP 36CM CURVE TIP HAR1136

## (undated) DEVICE — BNDG ABDOMINAL BINDER 9X45-62" 79-89071

## (undated) DEVICE — CLIP APPLIER ENDO ROTATING 10MM MED/LG ER320

## (undated) DEVICE — SU VICRYL+ 0 27 UR6 VLT VCP603H

## (undated) RX ORDER — DEXAMETHASONE SODIUM PHOSPHATE 4 MG/ML
INJECTION, SOLUTION INTRA-ARTICULAR; INTRALESIONAL; INTRAMUSCULAR; INTRAVENOUS; SOFT TISSUE
Status: DISPENSED
Start: 2024-05-14

## (undated) RX ORDER — GABAPENTIN 300 MG/1
CAPSULE ORAL
Status: DISPENSED
Start: 2024-05-14

## (undated) RX ORDER — FENTANYL CITRATE 50 UG/ML
INJECTION, SOLUTION INTRAMUSCULAR; INTRAVENOUS
Status: DISPENSED
Start: 2024-08-07

## (undated) RX ORDER — OXYCODONE HYDROCHLORIDE 5 MG/1
TABLET ORAL
Status: DISPENSED
Start: 2024-08-07

## (undated) RX ORDER — CARDIOPLEG/ORGAN PRESERV NO.1 9-198-2-1
BOTTLE PERFUSION
Status: DISPENSED
Start: 2024-05-14

## (undated) RX ORDER — PROTAMINE SULFATE 10 MG/ML
INJECTION, SOLUTION INTRAVENOUS
Status: DISPENSED
Start: 2024-05-14

## (undated) RX ORDER — HYDROMORPHONE HCL IN WATER/PF 6 MG/30 ML
PATIENT CONTROLLED ANALGESIA SYRINGE INTRAVENOUS
Status: DISPENSED
Start: 2024-05-14

## (undated) RX ORDER — ONDANSETRON 2 MG/ML
INJECTION INTRAMUSCULAR; INTRAVENOUS
Status: DISPENSED
Start: 2024-05-14

## (undated) RX ORDER — FENTANYL CITRATE 50 UG/ML
INJECTION, SOLUTION INTRAMUSCULAR; INTRAVENOUS
Status: DISPENSED
Start: 2024-05-14

## (undated) RX ORDER — PROPOFOL 10 MG/ML
INJECTION, EMULSION INTRAVENOUS
Status: DISPENSED
Start: 2024-05-14

## (undated) RX ORDER — ACETAMINOPHEN 325 MG/1
TABLET ORAL
Status: DISPENSED
Start: 2024-05-14

## (undated) RX ORDER — DEXTROSE MONOHYDRATE 50 MG/ML
INJECTION, SOLUTION INTRAVENOUS
Status: DISPENSED
Start: 2024-05-14

## (undated) RX ORDER — CEFAZOLIN SODIUM/WATER 2 G/20 ML
SYRINGE (ML) INTRAVENOUS
Status: DISPENSED
Start: 2024-08-07

## (undated) RX ORDER — HEPARIN SODIUM 5000 [USP'U]/.5ML
INJECTION, SOLUTION INTRAVENOUS; SUBCUTANEOUS
Status: DISPENSED
Start: 2024-08-07

## (undated) RX ORDER — ONDANSETRON 2 MG/ML
INJECTION INTRAMUSCULAR; INTRAVENOUS
Status: DISPENSED
Start: 2024-08-07

## (undated) RX ORDER — BUPIVACAINE HYDROCHLORIDE 2.5 MG/ML
INJECTION, SOLUTION EPIDURAL; INFILTRATION; INTRACAUDAL
Status: DISPENSED
Start: 2024-08-07

## (undated) RX ORDER — EPHEDRINE SULFATE 50 MG/ML
INJECTION, SOLUTION INTRAMUSCULAR; INTRAVENOUS; SUBCUTANEOUS
Status: DISPENSED
Start: 2024-05-14

## (undated) RX ORDER — CEFUROXIME SODIUM 1.5 G/16ML
INJECTION, POWDER, FOR SOLUTION INTRAVENOUS
Status: DISPENSED
Start: 2024-05-14

## (undated) RX ORDER — ACETAMINOPHEN 325 MG/1
TABLET ORAL
Status: DISPENSED
Start: 2024-08-07

## (undated) RX ORDER — FENTANYL CITRATE-0.9 % NACL/PF 10 MCG/ML
PLASTIC BAG, INJECTION (ML) INTRAVENOUS
Status: DISPENSED
Start: 2024-05-14

## (undated) RX ORDER — DEXTROSE, SODIUM CHLORIDE, SODIUM LACTATE, POTASSIUM CHLORIDE, AND CALCIUM CHLORIDE 5; .6; .31; .03; .02 G/100ML; G/100ML; G/100ML; G/100ML; G/100ML
INJECTION, SOLUTION INTRAVENOUS
Status: DISPENSED
Start: 2024-05-14

## (undated) RX ORDER — HYDROMORPHONE HYDROCHLORIDE 1 MG/ML
INJECTION, SOLUTION INTRAMUSCULAR; INTRAVENOUS; SUBCUTANEOUS
Status: DISPENSED
Start: 2024-05-14

## (undated) RX ORDER — HEPARIN SODIUM 1000 [USP'U]/ML
INJECTION, SOLUTION INTRAVENOUS; SUBCUTANEOUS
Status: DISPENSED
Start: 2024-05-14

## (undated) RX ORDER — KETOROLAC TROMETHAMINE 30 MG/ML
INJECTION, SOLUTION INTRAMUSCULAR; INTRAVENOUS
Status: DISPENSED
Start: 2024-05-14